# Patient Record
Sex: FEMALE | Race: WHITE | NOT HISPANIC OR LATINO | Employment: OTHER | URBAN - METROPOLITAN AREA
[De-identification: names, ages, dates, MRNs, and addresses within clinical notes are randomized per-mention and may not be internally consistent; named-entity substitution may affect disease eponyms.]

---

## 2021-09-20 ENCOUNTER — IMMUNIZATIONS (OUTPATIENT)
Dept: FAMILY MEDICINE CLINIC | Facility: HOSPITAL | Age: 66
End: 2021-09-20

## 2021-09-20 PROCEDURE — 0031A: CPT

## 2021-09-20 PROCEDURE — 91303: CPT

## 2022-04-19 ENCOUNTER — OFFICE VISIT (OUTPATIENT)
Dept: URGENT CARE | Facility: CLINIC | Age: 67
End: 2022-04-19
Payer: COMMERCIAL

## 2022-04-19 VITALS
RESPIRATION RATE: 20 BRPM | OXYGEN SATURATION: 99 % | SYSTOLIC BLOOD PRESSURE: 134 MMHG | HEART RATE: 88 BPM | TEMPERATURE: 98 F | WEIGHT: 141 LBS | HEIGHT: 65 IN | BODY MASS INDEX: 23.49 KG/M2 | DIASTOLIC BLOOD PRESSURE: 80 MMHG

## 2022-04-19 DIAGNOSIS — N30.00 ACUTE CYSTITIS WITHOUT HEMATURIA: Primary | ICD-10-CM

## 2022-04-19 DIAGNOSIS — R30.0 DYSURIA: ICD-10-CM

## 2022-04-19 LAB
SL AMB  POCT GLUCOSE, UA: NORMAL
SL AMB LEUKOCYTE ESTERASE,UA: NORMAL
SL AMB POCT BILIRUBIN,UA: NORMAL
SL AMB POCT BLOOD,UA: NORMAL
SL AMB POCT CLARITY,UA: CLEAR
SL AMB POCT COLOR,UA: YELLOW
SL AMB POCT KETONES,UA: NORMAL
SL AMB POCT NITRITE,UA: NORMAL
SL AMB POCT PH,UA: 6
SL AMB POCT SPECIFIC GRAVITY,UA: 1
SL AMB POCT URINE PROTEIN: NORMAL
SL AMB POCT UROBILINOGEN: 0.2

## 2022-04-19 PROCEDURE — 87086 URINE CULTURE/COLONY COUNT: CPT | Performed by: NURSE PRACTITIONER

## 2022-04-19 PROCEDURE — 87186 SC STD MICRODIL/AGAR DIL: CPT | Performed by: NURSE PRACTITIONER

## 2022-04-19 PROCEDURE — 99203 OFFICE O/P NEW LOW 30 MIN: CPT | Performed by: NURSE PRACTITIONER

## 2022-04-19 PROCEDURE — S9083 URGENT CARE CENTER GLOBAL: HCPCS | Performed by: NURSE PRACTITIONER

## 2022-04-19 PROCEDURE — 87077 CULTURE AEROBIC IDENTIFY: CPT | Performed by: NURSE PRACTITIONER

## 2022-04-19 PROCEDURE — 81002 URINALYSIS NONAUTO W/O SCOPE: CPT | Performed by: NURSE PRACTITIONER

## 2022-04-19 RX ORDER — NITROFURANTOIN 25; 75 MG/1; MG/1
100 CAPSULE ORAL 2 TIMES DAILY
Qty: 10 CAPSULE | Refills: 0 | Status: SHIPPED | OUTPATIENT
Start: 2022-04-19 | End: 2022-04-25 | Stop reason: ALTCHOICE

## 2022-04-19 NOTE — PROGRESS NOTES
[address] [facility]        NAME: Weston Frye is a 73 y o  female  : 1955    MRN: [id number]  DATE: 2022  TIME: 2:28 PM    Assessment and Plan   Acute cystitis without hematuria [N30 00]  1  Acute cystitis without hematuria  nitrofurantoin (MACROBID) 100 mg capsule   2  Dysuria  POCT urine dip    Urine culture    nitrofurantoin (MACROBID) 100 mg capsule         Patient Instructions       Follow up with PCP in 3-5 days  Proceed to  ER if symptoms worsen  You have a urine culture pending - you are to download  mychart for the results in 48-72 hours  You will be notified if the results are abnormal and the antibiotic needs changed  You are to avoid caffeine, alcohol  Drink water  You have been prescribed macrobid ke as prescribed  Follow up with your PCP  Go to the ED if symptoms worsen          Chief Complaint     Chief Complaint   Patient presents with    Possible UTI     chills, urinary pressure x 2 days          History of Present Illness       This is a 73year old female who states since Saturday she has had dark urine and dysuria  She states she started drinking a lot of water and the burning and darkness seemed to improve  She states that she called her PCP today and was told to come to care now  Pt states she has not had any fevers, n/v/d  But did have chills  No back pain  Review of Systems   Review of Systems   Constitutional: Positive for chills  HENT: Negative  Eyes: Negative  Respiratory: Negative  Cardiovascular: Negative  Gastrointestinal: Negative  Endocrine: Negative  Genitourinary: Positive for dysuria, frequency and urgency  Negative for flank pain and hematuria  Musculoskeletal: Negative  Skin: Negative  Allergic/Immunologic: Negative  Neurological: Negative  Hematological: Negative  Psychiatric/Behavioral: Negative            Current Medications       Current Outpatient Medications:     nitrofurantoin (MACROBID) 100 mg capsule, Take 1 capsule (100 mg total) by mouth 2 (two) times a day for 5 days, Disp: 10 capsule, Rfl: 0    Current Allergies     Allergies as of 04/19/2022 - Reviewed 04/19/2022   Allergen Reaction Noted    Sulfa antibiotics Hives 02/26/2016            The following portions of the patient's history were reviewed and updated as appropriate: allergies, current medications, past family history, past medical history, past social history, past surgical history and problem list      History reviewed  No pertinent past medical history  History reviewed  No pertinent surgical history  History reviewed  No pertinent family history  Medications have been verified  Objective   /80 (BP Location: Left arm, Patient Position: Sitting, Cuff Size: Standard)   Pulse 88   Temp 98 °F (36 7 °C)   Resp 20   Ht 5' 5" (1 651 m)   Wt 64 kg (141 lb)   SpO2 99%   BMI 23 46 kg/m²   No LMP recorded  Physical Exam     Physical Exam  Vitals and nursing note reviewed  Constitutional:       General: She is not in acute distress  Appearance: Normal appearance  She is normal weight  She is not ill-appearing, toxic-appearing or diaphoretic  HENT:      Head: Normocephalic and atraumatic  Eyes:      Extraocular Movements: Extraocular movements intact  Cardiovascular:      Rate and Rhythm: Normal rate and regular rhythm  Pulses: Normal pulses  Heart sounds: Normal heart sounds  Pulmonary:      Effort: Pulmonary effort is normal       Breath sounds: Normal breath sounds  Abdominal:      General: There is no distension  Palpations: Abdomen is soft  Tenderness: There is no abdominal tenderness  There is no right CVA tenderness or left CVA tenderness  Musculoskeletal:         General: Normal range of motion  Cervical back: Normal range of motion and neck supple  Skin:     General: Skin is warm and dry  Capillary Refill: Capillary refill takes less than 2 seconds  Neurological:      General: No focal deficit present  Mental Status: She is alert and oriented to person, place, and time  Psychiatric:         Mood and Affect: Mood normal          Behavior: Behavior normal          Thought Content: Thought content normal          Judgment: Judgment normal                UA large leuks, + nitrates and blood  Will send for ua cx     Will order macrobid - pt has had in past (however no cx were noted in [facility] care everywhere

## 2022-04-19 NOTE — PATIENT INSTRUCTIONS
You have a urine culture pending - you are to download  eZono for the results in 48-72 hours  You will be notified if the results are abnormal and the antibiotic needs changed  You are to avoid caffeine, alcohol  Drink water  You have been prescribed macrobid ke as prescribed  Follow up with your PCP  Go to the ED if symptoms worsen    Dysuria   WHAT YOU NEED TO KNOW:   Dysuria is difficulty urinating, or pain, burning, or discomfort with urination  Dysuria is usually a symptom of another problem  DISCHARGE INSTRUCTIONS:   Return to the emergency department if:   · You have severe back, side, or abdominal pain  · You have fever and shaking chills  · You vomit several times in a row  Contact your healthcare provider if:   · Your symptoms do not go away, even after treatment  · You have questions or concerns about your condition or care  Medicines:   · Medicines  may be given to help treat a bacterial infection or help decrease bladder spasms  · Take your medicine as directed  Contact your healthcare provider if you think your medicine is not helping or if you have side effects  Tell him of her if you are allergic to any medicine  Keep a list of the medicines, vitamins, and herbs you take  Include the amounts, and when and why you take them  Bring the list or the pill bottles to follow-up visits  Carry your medicine list with you in case of an emergency  Follow up with your healthcare provider as directed: Your healthcare provider may also refer you to a urologist or nephrologist to have additional testing  Write down your questions so you remember to ask them during your visits  Manage your dysuria:   · Drink more liquids  Liquids help flush out bacteria that may be causing an infection  Ask your healthcare provider how much liquid to drink each day and which liquids are best for you  · Take sitz baths as directed  Fill a bathtub with 4 to 6 inches of warm water   You may also use a sitz bath pan that fits over a toilet  Sit in the sitz bath for 20 minutes  Do this 2 to 3 times a day, or as directed  The warm water can help decrease pain and swelling  © Copyright [employer] 2022 Information is for End User's use only and may not be sold, redistributed or otherwise used for commercial purposes  All illustrations and images included in CareNotes® are the copyrighted property of [employer]  or Andreas Sidhu   The above information is an  only  It is not intended as medical advice for individual conditions or treatments  Talk to your doctor, nurse or pharmacist before following any medical regimen to see if it is safe and effective for you

## 2022-04-21 LAB — BACTERIA UR CULT: ABNORMAL

## 2022-04-25 ENCOUNTER — OFFICE VISIT (OUTPATIENT)
Dept: FAMILY MEDICINE CLINIC | Facility: CLINIC | Age: 67
End: 2022-04-25
Payer: COMMERCIAL

## 2022-04-25 VITALS
HEIGHT: 65 IN | SYSTOLIC BLOOD PRESSURE: 168 MMHG | HEART RATE: 97 BPM | TEMPERATURE: 98.1 F | WEIGHT: 138.4 LBS | BODY MASS INDEX: 23.06 KG/M2 | DIASTOLIC BLOOD PRESSURE: 102 MMHG | OXYGEN SATURATION: 96 %

## 2022-04-25 DIAGNOSIS — N30.00 ACUTE CYSTITIS WITHOUT HEMATURIA: Primary | ICD-10-CM

## 2022-04-25 DIAGNOSIS — E78.5 DYSLIPIDEMIA: ICD-10-CM

## 2022-04-25 DIAGNOSIS — R53.83 OTHER FATIGUE: ICD-10-CM

## 2022-04-25 LAB
BACTERIA UR QL AUTO: ABNORMAL /HPF
BILIRUB UR QL STRIP: NEGATIVE
CLARITY UR: ABNORMAL
COLOR UR: ABNORMAL
GLUCOSE UR STRIP-MCNC: NEGATIVE MG/DL
HGB UR QL STRIP.AUTO: ABNORMAL
KETONES UR STRIP-MCNC: NEGATIVE MG/DL
LEUKOCYTE ESTERASE UR QL STRIP: ABNORMAL
MUCOUS THREADS UR QL AUTO: ABNORMAL
NITRITE UR QL STRIP: NEGATIVE
NON-SQ EPI CELLS URNS QL MICRO: ABNORMAL /HPF
PH UR STRIP.AUTO: 5 [PH]
PROT UR STRIP-MCNC: ABNORMAL MG/DL
RBC #/AREA URNS AUTO: ABNORMAL /HPF
SP GR UR STRIP.AUTO: 1.01 (ref 1–1.03)
URATE CRY URNS QL MICRO: ABNORMAL /HPF
UROBILINOGEN UR STRIP-ACNC: <2 MG/DL
WBC #/AREA URNS AUTO: ABNORMAL /HPF

## 2022-04-25 PROCEDURE — 87086 URINE CULTURE/COLONY COUNT: CPT | Performed by: FAMILY MEDICINE

## 2022-04-25 PROCEDURE — 3008F BODY MASS INDEX DOCD: CPT | Performed by: FAMILY MEDICINE

## 2022-04-25 PROCEDURE — 3288F FALL RISK ASSESSMENT DOCD: CPT | Performed by: FAMILY MEDICINE

## 2022-04-25 PROCEDURE — 81001 URINALYSIS AUTO W/SCOPE: CPT | Performed by: FAMILY MEDICINE

## 2022-04-25 PROCEDURE — 3725F SCREEN DEPRESSION PERFORMED: CPT | Performed by: FAMILY MEDICINE

## 2022-04-25 PROCEDURE — 99204 OFFICE O/P NEW MOD 45 MIN: CPT | Performed by: FAMILY MEDICINE

## 2022-04-25 PROCEDURE — 1160F RVW MEDS BY RX/DR IN RCRD: CPT | Performed by: FAMILY MEDICINE

## 2022-04-25 PROCEDURE — 1101F PT FALLS ASSESS-DOCD LE1/YR: CPT | Performed by: FAMILY MEDICINE

## 2022-04-25 RX ORDER — LEVOFLOXACIN 250 MG/1
250 TABLET ORAL DAILY
Qty: 7 TABLET | Refills: 0 | Status: SHIPPED | OUTPATIENT
Start: 2022-04-25 | End: 2022-05-02

## 2022-04-25 NOTE — ASSESSMENT & PLAN NOTE
I am going to have the patient get some routine labs prior to her follow-up visit with me  I scheduled a follow-up visit in 1 month

## 2022-04-25 NOTE — PROGRESS NOTES
Assessment/Plan:    Acute cystitis without hematuria  I reviewed the records from the express care  Patient had urinary tract infection  Culture was positive for E coli  She was sensitive to the [address] which was prescribed  Patient is still symptomatic  I ordered a UA as well as a repeat culture and sensitivity  I started her on levofloxacin, pending results  Patient has been advised to drink plenty of fluids  Call if any worsening  Dyslipidemia  I am going to have the patient get some routine labs prior to her follow-up visit with me  I scheduled a follow-up visit in 1 month  Diagnoses and all orders for this visit:    Acute cystitis without hematuria  -     levofloxacin (LEVAQUIN) 250 mg tablet; Take 1 tablet (250 mg total) by mouth daily for 7 days  -     UA (URINE) with reflex to Scope  -     Cancel: Urine culture; Future  -     Urine culture; Future  -     Urine culture    Other fatigue  -     TSH, 3rd generation with Free T4 reflex; Future  -     CBC and differential; Future  -     Comprehensive metabolic panel; Future    Dyslipidemia  -     Lipid Panel with Direct LDL reflex; Future    Other orders  -     Ascorbic Acid (VITAMIN C PO); Take by mouth  -     VITAMIN D PO; Take by mouth  -     Multiple Vitamins-Minerals (ZINC PO); Take by mouth          Subjective:      Patient ID: Kayleigh Rodriguez is a 73 y o  female  This is a 50-year-old white female who presents to the office today for a follow-up visit from the urgent care  The patient has not been seen at this office for years  She tells me she developed urinary tract infection symptoms  She went to Urgent Care was started on Macrobid  She finished the antibiotic yesterday but is not feeling well  She complains of persistent chills although has no fever  Reports that her urine is not clear  It is very cloudy and bubbly  She is also experiencing urinary frequency  She denies any fever  She denies dysuria    She admits to urinary incontinence and urgency but tells me this is chronic  She denies any flank pain  Past medical history:  Significant for allergic rhinitis and kidney stones    Past surgical history:  Reports laser lithotripsy many years ago    Social history:  She smokes 5 cigarettes per day  Tells me she never smoked more  She is retired  She worked at [employer]  She denies alcohol use    Family history:  Her mother  from dementia  She had Parkinson's disease and hypothyroidism  Her father  of MI at age 70  He had hypertension  She has a daughter who also has kidney stones  Other daughter has V-tach  Allergies:  Patient is allergic to sulfa        The following portions of the patient's history were reviewed and updated as appropriate: allergies, current medications, past family history, past medical history, past social history, past surgical history and problem list     Review of Systems   Constitutional: Positive for chills and fatigue  Negative for activity change and fever  Cardiovascular: Negative for chest pain, palpitations and leg swelling  Gastrointestinal: Negative for abdominal distention, abdominal pain, blood in stool, constipation, diarrhea and nausea  Genitourinary: Positive for frequency and urgency  Negative for dysuria and hematuria  Objective:      BP (!) 168/102 (BP Location: Left arm, Patient Position: Sitting, Cuff Size: Adult)   Pulse 97   Temp 98 1 °F (36 7 °C) (Tympanic)   Ht 5' 5" (1 651 m)   Wt 62 8 kg (138 lb 6 4 oz)   SpO2 96%   BMI 23 03 kg/m²          Physical Exam  Vitals reviewed  Constitutional:       Comments: Patient is a pleasant 62-year-old white female who appears her stated age  She is nonseptic in appearance and in no distress   HENT:      Head: Normocephalic and atraumatic  Right Ear: Tympanic membrane, ear canal and external ear normal  There is no impacted cerumen        Left Ear: Tympanic membrane, ear canal and external ear normal  There is no impacted cerumen  Mouth/Throat:      Mouth: Mucous membranes are moist       Pharynx: Oropharynx is clear  No oropharyngeal exudate or posterior oropharyngeal erythema  Eyes:      General: No scleral icterus  Right eye: No discharge  Left eye: No discharge  Conjunctiva/sclera: Conjunctivae normal       Pupils: Pupils are equal, round, and reactive to light  Cardiovascular:      Rate and Rhythm: Normal rate and regular rhythm  Heart sounds: Normal heart sounds  No murmur heard  No friction rub  No gallop  Pulmonary:      Effort: Pulmonary effort is normal  No respiratory distress  Breath sounds: Normal breath sounds  No stridor  No wheezing, rhonchi or rales  Abdominal:      General: Bowel sounds are normal  There is no distension  Palpations: Abdomen is soft  There is no mass  Tenderness: There is no abdominal tenderness  There is no right CVA tenderness, left CVA tenderness or guarding  Musculoskeletal:      Cervical back: Neck supple  Lymphadenopathy:      Cervical: No cervical adenopathy  Psychiatric:         Mood and Affect: Mood normal          Behavior: Behavior normal          Thought Content:  Thought content normal          Judgment: Judgment normal        extremities:  Without cyanosis, clubbing, or edema

## 2022-04-25 NOTE — ASSESSMENT & PLAN NOTE
I reviewed the records from the [facility]  Patient had urinary tract infection  Culture was positive for E coli  She was sensitive to the [address] which was prescribed  Patient is still symptomatic  I ordered a UA as well as a repeat culture and sensitivity  I started her on levofloxacin, pending results  Patient has been advised to drink plenty of fluids  Call if any worsening

## 2022-04-26 ENCOUNTER — HOSPITAL ENCOUNTER (OUTPATIENT)
Dept: ULTRASOUND IMAGING | Facility: HOSPITAL | Age: 67
Discharge: HOME | End: 2022-04-26
Attending: FAMILY MEDICINE
Payer: COMMERCIAL

## 2022-04-26 DIAGNOSIS — R31.9 HEMATURIA, UNSPECIFIED TYPE: ICD-10-CM

## 2022-04-26 DIAGNOSIS — R31.9 HEMATURIA, UNSPECIFIED TYPE: Primary | ICD-10-CM

## 2022-04-26 PROCEDURE — 76770 US EXAM ABDO BACK WALL COMP: CPT

## 2022-04-26 NOTE — PROGRESS NOTES
I spoke to the patient  She is feeling much better after she took to levofloxacin  I spoke to the patient  She is feeling much better after she took to levofloxacin  Her urinalysis shows a large amount of microscopic hematuria  I am concerned about the possibility of a renal stone  She has prior history many years ago  Denies any symptoms suggestive of a stone  I am going to get ultrasound of the kidneys and bladder  She is likely going to need a follow-up visit to see a urologist as well  I will make further recommendations when I review her ultrasound as well as her urine culture, which is still pending

## 2022-04-27 LAB — BACTERIA UR CULT: NORMAL

## 2022-04-28 DIAGNOSIS — R31.29 MICROSCOPIC HEMATURIA: ICD-10-CM

## 2022-04-28 DIAGNOSIS — K76.9 LIVER LESION: ICD-10-CM

## 2022-04-28 DIAGNOSIS — N70.11 HYDROSALPINX: Primary | ICD-10-CM

## 2022-04-28 DIAGNOSIS — N13.30 HYDRONEPHROSIS, UNSPECIFIED HYDRONEPHROSIS TYPE: ICD-10-CM

## 2022-04-28 NOTE — PROGRESS NOTES
I spoke to the patient  The patient's urine culture was negative  Urinalysis is positive for microscopic blood  I reviewed her ultrasound with her  Ultrasound showed a mild right hydronephrosis  She is going to need to see Urology to evaluate this as well as to evaluate the microscopic hematuria  Ultrasound also showed a liver lesion  The lesion measures 1 8 centimeters  The patient will be scheduled for an MRI to rule out hemangioma  Lastly, right-sided hydrosalpinx was noted  Patient has not seen a gynecologist in many years and does not have 1  I placed a referral to see gynecology

## 2022-05-05 ENCOUNTER — TELEPHONE (OUTPATIENT)
Dept: FAMILY MEDICINE CLINIC | Facility: CLINIC | Age: 67
End: 2022-05-05

## 2022-05-05 NOTE — TELEPHONE ENCOUNTER
I sent in a prescription for Valium  Take 1 tablet 1 hour prior to MRI  Make sure Mary Grigsby drives

## 2022-05-05 NOTE — TELEPHONE ENCOUNTER
Patient called this am requesting that something is called in for her to have the scheduled mri on 5/10/2022  she feels she can not go through with the test without medication  please advise? Pharmacy [facility]

## 2022-05-09 ENCOUNTER — APPOINTMENT (OUTPATIENT)
Dept: LAB | Facility: HOSPITAL | Age: 67
End: 2022-05-09
Attending: FAMILY MEDICINE
Payer: COMMERCIAL

## 2022-05-09 DIAGNOSIS — D18.03 HEMANGIOMA OF LIVER: Primary | ICD-10-CM

## 2022-05-09 DIAGNOSIS — D18.03 HEMANGIOMA OF LIVER: ICD-10-CM

## 2022-05-09 LAB
BUN SERPL-MCNC: 14 MG/DL (ref 5–25)
CREAT SERPL-MCNC: 0.79 MG/DL (ref 0.6–1.3)
GFR SERPL CREATININE-BSD FRML MDRD: 78 ML/MIN/1.73SQ M

## 2022-05-09 PROCEDURE — 36415 COLL VENOUS BLD VENIPUNCTURE: CPT

## 2022-05-09 PROCEDURE — 84520 ASSAY OF UREA NITROGEN: CPT

## 2022-05-09 PROCEDURE — 82565 ASSAY OF CREATININE: CPT

## 2022-05-10 ENCOUNTER — HOSPITAL ENCOUNTER (OUTPATIENT)
Dept: MRI IMAGING | Facility: HOSPITAL | Age: 67
Discharge: HOME | End: 2022-05-10
Attending: FAMILY MEDICINE

## 2022-05-10 ENCOUNTER — TELEPHONE (OUTPATIENT)
Dept: FAMILY MEDICINE CLINIC | Facility: CLINIC | Age: 67
End: 2022-05-10

## 2022-05-10 DIAGNOSIS — K76.9 LIVER LESION: ICD-10-CM

## 2022-05-10 NOTE — TELEPHONE ENCOUNTER
PT CALLED TO LET YOU KNOW SHE WENT FOR HER MRI TODAY AND AS SOON AS THEY PUT HER IN THE TUBE SHE HAD TO BE PULLED BACK OUT   STATES SHE JUST COULD NOT DO IT

## 2022-05-12 ENCOUNTER — TELEPHONE (OUTPATIENT)
Dept: ADMINISTRATIVE | Facility: OTHER | Age: 67
End: 2022-05-12

## 2022-05-12 ENCOUNTER — TELEPHONE (OUTPATIENT)
Dept: FAMILY MEDICINE CLINIC | Facility: CLINIC | Age: 67
End: 2022-05-12

## 2022-05-12 NOTE — TELEPHONE ENCOUNTER
----- Message from Lashon Dela Cruz sent at 5/12/2022  8:12 AM EDT -----  Regarding: CARE GAP REQUEST  05/12/22 8:12 AM    Hello, our patient Keerthi Mancilla has had CRC: Colonoscopy completed/performed  Please assist in updating the patient chart by making an External outreach to DR Jernigan [facility] located in [facility] The date of service is UNKNOWN      Thank you,  Bridger Pacheco MA   [place]

## 2022-05-12 NOTE — TELEPHONE ENCOUNTER
Upon review of the In Basket request and the patient's chart, initial outreach has been made via fax, please see Contacts section for details       Thank you  Carlos Bullock MA

## 2022-05-12 NOTE — TELEPHONE ENCOUNTER
Patient seen Dr Merlinda Sever yesterday and would like you to give her a call regarding her appointment with Dr Merlinda Sever

## 2022-05-12 NOTE — LETTER
Procedure Request Form: Colonoscopy      Date Requested: 22  Patient: Jeraline Faden  Patient : 1955   Referring Provider: Golden Dos Santos, DO        Date of Procedure ______________________________       The above patient has informed us that they have completed their   most recent Colonoscopy at your facility  Please complete   this form and attach all corresponding procedure reports/results  Comments __________________________________________________________  ____________________________________________________________________  ____________________________________________________________________  ____________________________________________________________________    Facility Completing Procedure _________________________________________    Form Completed By (print name) _______________________________________      Signature __________________________________________________________      These reports are needed for  compliance  Please fax this completed form and a copy of the procedure report to our office located at [place] [address] as soon as possible to [phone] attention Daphnie Doss: Phone [phone]    We thank you for your assistance in treating our mutual patient

## 2022-05-16 ENCOUNTER — APPOINTMENT (OUTPATIENT)
Dept: LAB | Facility: CLINIC | Age: 67
End: 2022-05-16
Payer: COMMERCIAL

## 2022-05-16 DIAGNOSIS — E78.5 DYSLIPIDEMIA: ICD-10-CM

## 2022-05-16 DIAGNOSIS — R53.83 OTHER FATIGUE: ICD-10-CM

## 2022-05-16 LAB
ALBUMIN SERPL BCP-MCNC: 3.9 G/DL (ref 3.5–5)
ALP SERPL-CCNC: 56 U/L (ref 46–116)
ALT SERPL W P-5'-P-CCNC: 15 U/L (ref 12–78)
ANION GAP SERPL CALCULATED.3IONS-SCNC: 7 MMOL/L (ref 4–13)
AST SERPL W P-5'-P-CCNC: 16 U/L (ref 5–45)
BASOPHILS # BLD AUTO: 0.07 THOUSANDS/ΜL (ref 0–0.1)
BASOPHILS NFR BLD AUTO: 1 % (ref 0–1)
BILIRUB SERPL-MCNC: 1.23 MG/DL (ref 0.2–1)
BUN SERPL-MCNC: 15 MG/DL (ref 5–25)
CALCIUM SERPL-MCNC: 9.3 MG/DL (ref 8.3–10.1)
CHLORIDE SERPL-SCNC: 108 MMOL/L (ref 100–108)
CHOLEST SERPL-MCNC: 247 MG/DL
CO2 SERPL-SCNC: 26 MMOL/L (ref 21–32)
CREAT SERPL-MCNC: 0.81 MG/DL (ref 0.6–1.3)
EOSINOPHIL # BLD AUTO: 0.22 THOUSAND/ΜL (ref 0–0.61)
EOSINOPHIL NFR BLD AUTO: 3 % (ref 0–6)
ERYTHROCYTE [DISTWIDTH] IN BLOOD BY AUTOMATED COUNT: 13.3 % (ref 11.6–15.1)
GFR SERPL CREATININE-BSD FRML MDRD: 76 ML/MIN/1.73SQ M
GLUCOSE P FAST SERPL-MCNC: 105 MG/DL (ref 65–99)
HCT VFR BLD AUTO: 42.2 % (ref 34.8–46.1)
HDLC SERPL-MCNC: 59 MG/DL
HGB BLD-MCNC: 13.9 G/DL (ref 11.5–15.4)
IMM GRANULOCYTES # BLD AUTO: 0.01 THOUSAND/UL (ref 0–0.2)
IMM GRANULOCYTES NFR BLD AUTO: 0 % (ref 0–2)
LDLC SERPL CALC-MCNC: 168 MG/DL (ref 0–100)
LYMPHOCYTES # BLD AUTO: 2.16 THOUSANDS/ΜL (ref 0.6–4.47)
LYMPHOCYTES NFR BLD AUTO: 31 % (ref 14–44)
MCH RBC QN AUTO: 29.9 PG (ref 26.8–34.3)
MCHC RBC AUTO-ENTMCNC: 32.9 G/DL (ref 31.4–37.4)
MCV RBC AUTO: 91 FL (ref 82–98)
MONOCYTES # BLD AUTO: 0.59 THOUSAND/ΜL (ref 0.17–1.22)
MONOCYTES NFR BLD AUTO: 8 % (ref 4–12)
NEUTROPHILS # BLD AUTO: 3.96 THOUSANDS/ΜL (ref 1.85–7.62)
NEUTS SEG NFR BLD AUTO: 57 % (ref 43–75)
NRBC BLD AUTO-RTO: 0 /100 WBCS
PLATELET # BLD AUTO: 348 THOUSANDS/UL (ref 149–390)
PMV BLD AUTO: 9.7 FL (ref 8.9–12.7)
POTASSIUM SERPL-SCNC: 3.7 MMOL/L (ref 3.5–5.3)
PROT SERPL-MCNC: 7.5 G/DL (ref 6.4–8.2)
RBC # BLD AUTO: 4.65 MILLION/UL (ref 3.81–5.12)
SODIUM SERPL-SCNC: 141 MMOL/L (ref 136–145)
TRIGL SERPL-MCNC: 98 MG/DL
TSH SERPL DL<=0.05 MIU/L-ACNC: 1.18 UIU/ML (ref 0.45–4.5)
WBC # BLD AUTO: 7.01 THOUSAND/UL (ref 4.31–10.16)

## 2022-05-16 PROCEDURE — 36415 COLL VENOUS BLD VENIPUNCTURE: CPT

## 2022-05-16 PROCEDURE — 84443 ASSAY THYROID STIM HORMONE: CPT

## 2022-05-16 PROCEDURE — 80061 LIPID PANEL: CPT

## 2022-05-16 PROCEDURE — 85025 COMPLETE CBC W/AUTO DIFF WBC: CPT

## 2022-05-16 PROCEDURE — 80053 COMPREHEN METABOLIC PANEL: CPT

## 2022-05-17 ENCOUNTER — APPOINTMENT (EMERGENCY)
Dept: CT IMAGING | Facility: HOSPITAL | Age: 67
End: 2022-05-17
Payer: COMMERCIAL

## 2022-05-17 ENCOUNTER — HOSPITAL ENCOUNTER (EMERGENCY)
Facility: HOSPITAL | Age: 67
Discharge: HOME | End: 2022-05-17
Attending: EMERGENCY MEDICINE | Admitting: EMERGENCY MEDICINE
Payer: COMMERCIAL

## 2022-05-17 VITALS
HEART RATE: 80 BPM | WEIGHT: 135.36 LBS | DIASTOLIC BLOOD PRESSURE: 72 MMHG | BODY MASS INDEX: 22.53 KG/M2 | TEMPERATURE: 97.9 F | SYSTOLIC BLOOD PRESSURE: 160 MMHG | RESPIRATION RATE: 18 BRPM | OXYGEN SATURATION: 97 %

## 2022-05-17 DIAGNOSIS — N21.0 BLADDER CALCULUS: Primary | ICD-10-CM

## 2022-05-17 DIAGNOSIS — N13.30 HYDRONEPHROSIS: ICD-10-CM

## 2022-05-17 DIAGNOSIS — R19.00 PELVIC MASS: ICD-10-CM

## 2022-05-17 DIAGNOSIS — K76.9 LIVER LESION: ICD-10-CM

## 2022-05-17 DIAGNOSIS — N20.0 NEPHROLITHIASIS: ICD-10-CM

## 2022-05-17 LAB
ALBUMIN SERPL BCP-MCNC: 3.8 G/DL (ref 3.5–5)
ALP SERPL-CCNC: 60 U/L (ref 46–116)
ALT SERPL W P-5'-P-CCNC: 16 U/L (ref 12–78)
ANION GAP SERPL CALCULATED.3IONS-SCNC: 10 MMOL/L (ref 4–13)
AST SERPL W P-5'-P-CCNC: 13 U/L (ref 5–45)
BACTERIA UR QL AUTO: ABNORMAL /HPF
BASOPHILS # BLD AUTO: 0.05 THOUSANDS/ΜL (ref 0–0.1)
BASOPHILS NFR BLD AUTO: 1 % (ref 0–1)
BILIRUB SERPL-MCNC: 0.36 MG/DL (ref 0.2–1)
BILIRUB UR QL STRIP: NEGATIVE
BUN SERPL-MCNC: 25 MG/DL (ref 5–25)
CALCIUM SERPL-MCNC: 9.2 MG/DL (ref 8.3–10.1)
CHLORIDE SERPL-SCNC: 104 MMOL/L (ref 100–108)
CLARITY UR: ABNORMAL
CO2 SERPL-SCNC: 26 MMOL/L (ref 21–32)
COLOR UR: YELLOW
CREAT SERPL-MCNC: 0.95 MG/DL (ref 0.6–1.3)
EOSINOPHIL # BLD AUTO: 0.19 THOUSAND/ΜL (ref 0–0.61)
EOSINOPHIL NFR BLD AUTO: 2 % (ref 0–6)
ERYTHROCYTE [DISTWIDTH] IN BLOOD BY AUTOMATED COUNT: 13.2 % (ref 11.6–15.1)
GFR SERPL CREATININE-BSD FRML MDRD: 63 ML/MIN/1.73SQ M
GLUCOSE SERPL-MCNC: 135 MG/DL (ref 65–140)
GLUCOSE UR STRIP-MCNC: NEGATIVE MG/DL
HCT VFR BLD AUTO: 40.4 % (ref 34.8–46.1)
HGB BLD-MCNC: 13.2 G/DL (ref 11.5–15.4)
HGB UR QL STRIP.AUTO: ABNORMAL
IMM GRANULOCYTES # BLD AUTO: 0.02 THOUSAND/UL (ref 0–0.2)
IMM GRANULOCYTES NFR BLD AUTO: 0 % (ref 0–2)
KETONES UR STRIP-MCNC: NEGATIVE MG/DL
LEUKOCYTE ESTERASE UR QL STRIP: NEGATIVE
LIPASE SERPL-CCNC: 175 U/L (ref 73–393)
LYMPHOCYTES # BLD AUTO: 2 THOUSANDS/ΜL (ref 0.6–4.47)
LYMPHOCYTES NFR BLD AUTO: 26 % (ref 14–44)
MCH RBC QN AUTO: 29.8 PG (ref 26.8–34.3)
MCHC RBC AUTO-ENTMCNC: 32.7 G/DL (ref 31.4–37.4)
MCV RBC AUTO: 91 FL (ref 82–98)
MONOCYTES # BLD AUTO: 0.58 THOUSAND/ΜL (ref 0.17–1.22)
MONOCYTES NFR BLD AUTO: 7 % (ref 4–12)
NEUTROPHILS # BLD AUTO: 4.98 THOUSANDS/ΜL (ref 1.85–7.62)
NEUTS SEG NFR BLD AUTO: 64 % (ref 43–75)
NITRITE UR QL STRIP: NEGATIVE
NON-SQ EPI CELLS URNS QL MICRO: ABNORMAL /HPF
NRBC BLD AUTO-RTO: 0 /100 WBCS
PH UR STRIP.AUTO: 6 [PH]
PLATELET # BLD AUTO: 308 THOUSANDS/UL (ref 149–390)
PMV BLD AUTO: 9.4 FL (ref 8.9–12.7)
POTASSIUM SERPL-SCNC: 4 MMOL/L (ref 3.5–5.3)
PROT SERPL-MCNC: 7.4 G/DL (ref 6.4–8.2)
PROT UR STRIP-MCNC: NEGATIVE MG/DL
RBC # BLD AUTO: 4.43 MILLION/UL (ref 3.81–5.12)
RBC #/AREA URNS AUTO: ABNORMAL /HPF
SODIUM SERPL-SCNC: 140 MMOL/L (ref 136–145)
SP GR UR STRIP.AUTO: <=1.005 (ref 1–1.03)
UROBILINOGEN UR QL STRIP.AUTO: 0.2 E.U./DL
WBC # BLD AUTO: 7.82 THOUSAND/UL (ref 4.31–10.16)
WBC #/AREA URNS AUTO: ABNORMAL /HPF

## 2022-05-17 PROCEDURE — 83690 ASSAY OF LIPASE: CPT | Performed by: PHYSICIAN ASSISTANT

## 2022-05-17 PROCEDURE — 96361 HYDRATE IV INFUSION ADD-ON: CPT

## 2022-05-17 PROCEDURE — 36415 COLL VENOUS BLD VENIPUNCTURE: CPT | Performed by: PHYSICIAN ASSISTANT

## 2022-05-17 PROCEDURE — 96360 HYDRATION IV INFUSION INIT: CPT

## 2022-05-17 PROCEDURE — G1004 CDSM NDSC: HCPCS

## 2022-05-17 PROCEDURE — 85025 COMPLETE CBC W/AUTO DIFF WBC: CPT | Performed by: PHYSICIAN ASSISTANT

## 2022-05-17 PROCEDURE — 99284 EMERGENCY DEPT VISIT MOD MDM: CPT

## 2022-05-17 PROCEDURE — 74176 CT ABD & PELVIS W/O CONTRAST: CPT

## 2022-05-17 PROCEDURE — 99284 EMERGENCY DEPT VISIT MOD MDM: CPT | Performed by: PHYSICIAN ASSISTANT

## 2022-05-17 PROCEDURE — 80053 COMPREHEN METABOLIC PANEL: CPT | Performed by: PHYSICIAN ASSISTANT

## 2022-05-17 PROCEDURE — 81001 URINALYSIS AUTO W/SCOPE: CPT | Performed by: PHYSICIAN ASSISTANT

## 2022-05-17 RX ADMIN — SODIUM CHLORIDE 1000 ML: 0.9 INJECTION, SOLUTION INTRAVENOUS at 18:13

## 2022-05-17 NOTE — TELEPHONE ENCOUNTER
Upon review of the In Basket request we were able to locate, review, and update the patient chart as requested for CRC: Colonoscopy  Any additional questions or concerns should be emailed to the Practice Liaisons via [email or web address]  [email or web address] email, please do not reply via In Basket      Thank you  Beverlyn Severin, MA

## 2022-05-17 NOTE — ED NOTES
Patient ambulating to bathroom at this time     Shira Cuevas, [address] [facility]  05/17/22 [id number]

## 2022-05-17 NOTE — Clinical Note
Lucrecia Phillips was seen and treated in our emergency department on 5/17/2022  Diagnosis: renal calculi    Jennifer Geiger    She may return on this date: 05/18/2022    Pt may relax on the couch,  to complete household duties  If you have any questions or concerns, please don't hesitate to call        Jeannie King PA-C    ______________________________           _______________          _______________  Hospital Representative                              Date                                Time

## 2022-05-17 NOTE — DISCHARGE INSTRUCTIONS
follow-up pelvic ultrasound is recommended to further characterize the pelvic mass , this appears to be a cyst like structure

## 2022-05-17 NOTE — ED PROVIDER NOTES
History  Chief Complaint   Patient presents with    Flank Pain     Patient reports UTI one month ago took 5days of Macrobid  Has not felt right since and had multiple test since with no resolution  Today started with severe pain in right back and unable to urinate  Patient presents to the emergency department today for evaluation inability urinate right flank pain  This is a very pleasant 21-year-old female presents with family  Her tale commenced just about a month ago she had dark urine she was seen at an urgent care she had E coli positive urine which was treated with 5 days of Bactrim, the symptoms did not get better primary care park started her on a fluoroquinolone which she states she did get better from  She has had ultrasound which has noted hydronephrosis  She was scheduled for an MRI today however was too claustrophobic to complete  She states she was out shopping today noted sharp pain in the right flank  Since that point she has been unable to urinate she feels though her bladder is full  She is nontoxic appearing at bedside  Prior to Admission Medications   Prescriptions Last Dose Informant Patient Reported? Taking? Ascorbic Acid (VITAMIN C PO)  Self Yes No   Sig: Take by mouth   Multiple Vitamins-Minerals (ZINC PO)  Self Yes No   Sig: Take by mouth   VITAMIN D PO  Self Yes No   Sig: Take by mouth   diazepam (VALIUM) 10 mg tablet   No No   Sig: Take 1 tablet 1 hour prior to MRI      Facility-Administered Medications: None       Past Medical History:   Diagnosis Date    Allergic     seasonal       Past Surgical History:   Procedure Laterality Date    COLONOSCOPY         Family History   Problem Relation Age of Onset    No Known Problems Mother     Hypertension Father      I have reviewed and agree with the history as documented      E-Cigarette/Vaping    E-Cigarette Use Never User      E-Cigarette/Vaping Substances    Nicotine No     THC No     CBD No     Flavoring No  Other No     Unknown No      Social History     Tobacco Use    Smoking status: Current Every Day Smoker     Packs/day: 0 25     Years: 50 00     Pack years: 12 50     Types: Cigarettes     Start date: 0    Smokeless tobacco: Never Used   Vaping Use    Vaping Use: Never used   Substance Use Topics    Alcohol use: Not Currently    Drug use: Never       Review of Systems   Constitutional: Negative  Negative for chills and fever  HENT: Negative  Negative for sore throat and trouble swallowing  Eyes: Negative  Respiratory: Negative  Negative for cough, shortness of breath and wheezing  Cardiovascular: Negative  Negative for chest pain and leg swelling  Gastrointestinal: Negative  Negative for abdominal pain, blood in stool and vomiting  Endocrine: Negative  Genitourinary: Positive for difficulty urinating and flank pain  Musculoskeletal: Positive for back pain  Negative for neck stiffness  Skin: Negative  Allergic/Immunologic: Negative  Neurological: Negative  Negative for dizziness, seizures, speech difficulty, weakness, light-headedness, numbness and headaches  Hematological: Negative  Psychiatric/Behavioral: Negative  All other systems reviewed and are negative  Physical Exam  Physical Exam  Vitals reviewed  Constitutional:       General: She is not in acute distress  Appearance: She is well-developed  She is not ill-appearing or diaphoretic  HENT:      Right Ear: External ear normal  No swelling  Tympanic membrane is not bulging  Left Ear: External ear normal  No swelling  Tympanic membrane is not bulging  Nose: Nose normal       Mouth/Throat:      Pharynx: No oropharyngeal exudate  Eyes:      General: Lids are normal       Conjunctiva/sclera: Conjunctivae normal       Pupils: Pupils are equal, round, and reactive to light  Neck:      Thyroid: No thyromegaly  Vascular: No JVD  Trachea: No tracheal deviation     Cardiovascular: Rate and Rhythm: Normal rate and regular rhythm  Pulses: Normal pulses  Heart sounds: Normal heart sounds  No murmur heard  No friction rub  No gallop  Pulmonary:      Effort: Pulmonary effort is normal  No respiratory distress  Breath sounds: Normal breath sounds  No stridor  No wheezing or rales  Chest:      Chest wall: No tenderness  Abdominal:      General: Bowel sounds are normal  There is no distension  Palpations: Abdomen is soft  There is no mass  Tenderness: There is no abdominal tenderness  There is no guarding or rebound  Negative signs include Crisostomo's sign  Hernia: No hernia is present  Musculoskeletal:         General: No tenderness  Normal range of motion  Cervical back: Normal range of motion and neck supple  No edema  Normal range of motion  Lymphadenopathy:      Cervical: No cervical adenopathy  Skin:     General: Skin is warm and dry  Capillary Refill: Capillary refill takes less than 2 seconds  Coloration: Skin is not pale  Findings: No erythema or rash  Neurological:      General: No focal deficit present  Mental Status: She is alert and oriented to person, place, and time  GCS: GCS eye subscore is 4  GCS verbal subscore is 5  GCS motor subscore is 6  Cranial Nerves: No cranial nerve deficit  Sensory: No sensory deficit  Deep Tendon Reflexes: Reflexes are normal and symmetric  Psychiatric:         Mood and Affect: Mood normal          Speech: Speech normal          Behavior: Behavior normal          Thought Content:  Thought content normal          Judgment: Judgment normal          Vital Signs  ED Triage Vitals [05/17/22 1755]   Temperature Pulse Respirations Blood Pressure SpO2   97 9 °F (36 6 °C) 91 20 (!) 193/111 98 %      Temp Source Heart Rate Source Patient Position - Orthostatic VS BP Location FiO2 (%)   Temporal Monitor Lying Left arm --      Pain Score       --           Vitals: 05/17/22 1755 05/17/22 1900   BP: (!) 193/111 160/72   Pulse: 91 80   Patient Position - Orthostatic VS: Lying Lying         Visual Acuity      ED Medications  Medications   sodium chloride 0 9 % bolus 1,000 mL (1,000 mL Intravenous New Bag 5/17/22 1813)       Diagnostic Studies  Results Reviewed     Procedure Component Value Units Date/Time    Urine Microscopic [[id number]]  (Abnormal) Collected: 05/17/22 1842    Lab Status: Final result Specimen: Urine, Clean Catch Updated: 05/17/22 1856     RBC, UA 2-4 /hpf      WBC, UA 4-10 /hpf      Epithelial Cells Occasional /hpf      Bacteria, UA Occasional /hpf     UA w Reflex to Microscopic w Reflex to Culture [[id number]]  (Abnormal) Collected: 05/17/22 1842    Lab Status: Final result Specimen: Urine, Clean Catch Updated: 05/17/22 1850     Color, UA Yellow     Clarity, UA Slightly Cloudy     Specific Gravity, UA <=1 005     pH, UA 6 0     Leukocytes, UA Negative     Nitrite, UA Negative     Protein, UA Negative mg/dl      Glucose, UA Negative mg/dl      Ketones, UA Negative mg/dl      Urobilinogen, UA 0 2 E U /dl      Bilirubin, UA Negative     Blood, UA Moderate    Comprehensive metabolic panel [[id number]] Collected: 05/17/22 1813    Lab Status: Final result Specimen: Blood from Arm, Right Updated: 05/17/22 1834     Sodium 140 mmol/L      Potassium 4 0 mmol/L      Chloride 104 mmol/L      CO2 26 mmol/L      ANION GAP 10 mmol/L      BUN 25 mg/dL      Creatinine 0 95 mg/dL      Glucose 135 mg/dL      Calcium 9 2 mg/dL      AST 13 U/L      ALT 16 U/L      Alkaline Phosphatase 60 U/L      Total Protein 7 4 g/dL      Albumin 3 8 g/dL      Total Bilirubin 0 36 mg/dL      eGFR 63 ml/min/1 73sq m     Narrative:      Aashish guidelines for Chronic Kidney Disease (CKD):     Stage 1 with normal or high GFR (GFR > 90 mL/min/1 73 square meters)    Stage 2 Mild CKD (GFR = 60-89 mL/min/1 73 square meters)    Stage 3A Moderate CKD (GFR = 45-59 mL/min/1 73 square meters)    Stage 3B Moderate CKD (GFR = 30-44 mL/min/1 73 square meters)    Stage 4 Severe CKD (GFR = 15-29 mL/min/1 73 square meters)    Stage 5 End Stage CKD (GFR <15 mL/min/1 73 square meters)  Note: GFR calculation is accurate only with a steady state creatinine    Lipase [[id number]]  (Normal) Collected: 05/17/22 1813    Lab Status: Final result Specimen: Blood from Arm, Right Updated: 05/17/22 1834     Lipase 175 u/L     CBC and differential [[id number]] Collected: 05/17/22 1813    Lab Status: Final result Specimen: Blood from Arm, Right Updated: 05/17/22 1820     WBC 7 82 Thousand/uL      RBC 4 43 Million/uL      Hemoglobin 13 2 g/dL      Hematocrit 40 4 %      MCV 91 fL      MCH 29 8 pg      MCHC 32 7 g/dL      RDW 13 2 %      MPV 9 4 fL      Platelets 144 Thousands/uL      nRBC 0 /100 WBCs      Neutrophils Relative 64 %      Immat GRANS % 0 %      Lymphocytes Relative 26 %      Monocytes Relative 7 %      Eosinophils Relative 2 %      Basophils Relative 1 %      Neutrophils Absolute 4 98 Thousands/µL      Immature Grans Absolute 0 02 Thousand/uL      Lymphocytes Absolute 2 00 Thousands/µL      Monocytes Absolute 0 58 Thousand/µL      Eosinophils Absolute 0 19 Thousand/µL      Basophils Absolute 0 05 Thousands/µL                  CT abdomen pelvis wo contrast   Final Result by Jessenia Lanier MD (05/17 1927)      1  Moderate right hydronephrosis and hydroureter which can be traced down to the pelvis  There is a 2 mm calculus in the urinary bladder  It is questioned whether this calculus may have caused obstruction of the right kidney and ureter  Follow-up    recommended to ensure resolution  2   Right-sided nephrolithiasis  3   A couple of nonspecific liver lesions  These can be better evaluated by MRI  4   9 5 cm cystic mass in the right pelvis  Hydrosalpinx versus cystic ovarian mass or adnexal mass  Nonemergent pelvic ultrasound follow-up recommended        The study was marked in EPIC for immediate notification  Workstation performed: [id number]                    Procedures  Procedures         ED Course  ED Course as of 05/17/22 2006   Tue May 17, 2022   1823 WBC: 7 82   1823 Hemoglobin: 13 2   1823 Platelet Count: 336   1823 Blood Pressure(!): 193/111   1823 Temperature: 97 9 °F (36 6 °C)   1823 Pulse: 91   1823 Respirations: 20   1823 SpO2: 98 %   1846 Lipase: 175   1846 eGFR: 63   1846 WBC: 7 82   1846 Hemoglobin: 13 2   1846 Platelet Count: 044   1921 Blood, UA(!): Moderate   1921 Clarity, UA: Slightly Cloudy   1921 WBC, UA(!): 4-10   1921 Bacteria, UA: Occasional   1921 Lipase: 175   1953 IMPRESSION:     1  Moderate right hydronephrosis and hydroureter which can be traced down to the pelvis  There is a 2 mm calculus in the urinary bladder  It is questioned whether this calculus may have caused obstruction of the right kidney and ureter  Follow-up   recommended to ensure resolution  2   Right-sided nephrolithiasis  3   A couple of nonspecific liver lesions  These can be better evaluated by MRI  4   9 5 cm cystic mass in the right pelvis  Hydrosalpinx versus cystic ovarian mass or adnexal mass  Nonemergent pelvic ultrasound follow-up recommended  SBIRT 22yo+    Flowsheet Row Most Recent Value   SBIRT (23 yo +)    In order to provide better care to our patients, we are screening all of our patients for alcohol and drug use  Would it be okay to ask you these screening questions? Yes Filed at: 05/17/2022 1804   Initial Alcohol Screen: US AUDIT-C     1  How often do you have a drink containing alcohol? 0 Filed at: 05/17/2022 1804   2  How many drinks containing alcohol do you have on a typical day you are drinking? 0 Filed at: 05/17/2022 1804   3a  Male UNDER 65: How often do you have five or more drinks on one occasion? 0 Filed at: 05/17/2022 1804   3b  FEMALE Any Age, or MALE 65+:  How often do you have 4 or more drinks on one occassion? 0 Filed at: 05/17/2022 1804   Audit-C Score 0 Filed at: 05/17/2022 1286   KISHA: How many times in the past year have you    Used an illegal drug or used a prescription medication for non-medical reasons? Never Filed at: 05/17/2022 1804                    MDM    Disposition  Final diagnoses:   Bladder calculus   Hydronephrosis   Liver lesion   Nephrolithiasis - Right-sided   Pelvic mass     Time reflects when diagnosis was documented in both MDM as applicable and the Disposition within this note     Time User Action Codes Description Comment    5/17/2022  7:53 PM Darinel Catrachito D Add [N21 0] Bladder calculus     5/17/2022  7:53 PM Darinel Catrachito D Add [N13 30] Hydronephrosis     5/17/2022  7:53 PM Darinel Catrachito D Add [K76 9] Liver lesion     5/17/2022  7:53 PM Darinel Catrachito D Add [N20 0] Nephrolithiasis     5/17/2022  7:54 PM Darinel Catrachito D Modify [N20 0] Nephrolithiasis Right-sided    5/17/2022  7:54 PM Zoe Marin Add [R19 00] Pelvic mass       ED Disposition     ED Disposition   Discharge    Condition   Stable    Date/Time   Tue May 17, 2022  7:55 PM    Comment   [place] discharge to home/self care                 Follow-up Information     Follow up With Specialties Details Why Contact Info    Mary Carmen Lowery MD Obstetrics and Gynecology, Gynecology, Obstetrics Schedule an appointment as soon as possible for a visit   [address] [address]  [phone]            Patient's Medications   Discharge Prescriptions    No medications on file           PDMP Review       Value Time User    PDMP Reviewed  Yes 5/5/2022 10:15 AM Sera Michelle DO          ED Provider  Electronically Signed by           Teddy Bruno PA-C  05/17/22 2006

## 2022-05-20 ENCOUNTER — RA CDI HCC (OUTPATIENT)
Dept: OTHER | Facility: HOSPITAL | Age: 67
End: 2022-05-20

## 2022-05-20 NOTE — PROGRESS NOTES
[facility] 75  coding opportunities       Chart reviewed, no opportunity found:   [address]        Patients Insurance     Medicare Insurance: [employer]

## 2022-05-26 ENCOUNTER — OFFICE VISIT (OUTPATIENT)
Dept: FAMILY MEDICINE CLINIC | Facility: CLINIC | Age: 67
End: 2022-05-26
Payer: COMMERCIAL

## 2022-05-26 VITALS
SYSTOLIC BLOOD PRESSURE: 152 MMHG | DIASTOLIC BLOOD PRESSURE: 94 MMHG | WEIGHT: 135.6 LBS | TEMPERATURE: 97.2 F | BODY MASS INDEX: 22.59 KG/M2 | OXYGEN SATURATION: 99 % | HEIGHT: 65 IN | HEART RATE: 89 BPM

## 2022-05-26 DIAGNOSIS — E78.00 PURE HYPERCHOLESTEROLEMIA: ICD-10-CM

## 2022-05-26 DIAGNOSIS — R19.00 PELVIC MASS: ICD-10-CM

## 2022-05-26 DIAGNOSIS — K76.9 LESION OF LIVER: ICD-10-CM

## 2022-05-26 DIAGNOSIS — I10 ESSENTIAL HYPERTENSION, BENIGN: Primary | ICD-10-CM

## 2022-05-26 PROCEDURE — 3008F BODY MASS INDEX DOCD: CPT | Performed by: FAMILY MEDICINE

## 2022-05-26 PROCEDURE — 99214 OFFICE O/P EST MOD 30 MIN: CPT | Performed by: FAMILY MEDICINE

## 2022-05-26 PROCEDURE — 1160F RVW MEDS BY RX/DR IN RCRD: CPT | Performed by: FAMILY MEDICINE

## 2022-05-26 RX ORDER — ROSUVASTATIN CALCIUM 10 MG/1
10 TABLET, COATED ORAL
Qty: 30 TABLET | Refills: 5 | Status: SHIPPED | OUTPATIENT
Start: 2022-05-26

## 2022-05-26 RX ORDER — VALSARTAN 160 MG/1
160 TABLET ORAL DAILY
Qty: 30 TABLET | Refills: 5 | Status: SHIPPED | OUTPATIENT
Start: 2022-05-26

## 2022-05-26 NOTE — ASSESSMENT & PLAN NOTE
Patient has nonspecific liver lesions  Unfortunately, she was unable to undergo MRI, even after having been given 10 mg of Valium  The liver lesion showed up on her ultrasound  They again showed up on her CT scan  Unfortunately, she had CT scan, which had been done without contrast, was also unable to delineate what these liver lesions are  I suggested we get a repeat CT with contrast in 3 months to evaluate  If we still can not determine what these lesions are, she may need to see Gastroenterology  We may need to even consider liver biopsy

## 2022-05-26 NOTE — ASSESSMENT & PLAN NOTE
Ultrasound showed mass in the right pelvis  CT confirmed a 9 5 cm cystic mass in the right pelvis  I previously had placed a referral for the patient to see gynecology  She does have an upcoming appointment and will follow-up with gynecology    She is also due for mammogram

## 2022-05-26 NOTE — ASSESSMENT & PLAN NOTE
Patient has hyperlipidemia  Total cholesterol was 247  LDL cholesterol is 168  Her goal is less than 100  Her ACC/AHA cardiovascular risk is calculated to 19 9%  As such, I think we should really should start her on statin therapy at this time  Patient feels it is hereditary  I suspected his as well  She was started on rosuvastatin 10 mg  She will take 1 at bedtime  I planned repeat lipid panel in 6 months

## 2022-05-26 NOTE — ASSESSMENT & PLAN NOTE
Patient has hypertension  Her blood pressure has been consistently elevated  I check her blood pressure again today and found it to be 160/94  Antihypertensive therapy is indicated  I have asked the patient to follow a low-sodium diet  I asked the patient to try to exercise regularly  I started the patient on valsartan 160 mg  She will take 1 every morning  I reviewed with her her labs  Renal function was normal   CBC and thyroid function was normal   Fasting blood glucose was 105   A follow-up visit has been scheduled to reassess her blood pressure

## 2022-05-26 NOTE — PROGRESS NOTES
Assessment/Plan:    Essential hypertension, benign  Patient has hypertension  Her blood pressure has been consistently elevated  I check her blood pressure again today and found it to be 160/94  Antihypertensive therapy is indicated  I have asked the patient to follow a low-sodium diet  I asked the patient to try to exercise regularly  I started the patient on valsartan 160 mg  She will take 1 every morning  I reviewed with her her labs  Renal function was normal   CBC and thyroid function was normal   Fasting blood glucose was 105  A follow-up visit has been scheduled to reassess her blood pressure     Pure hypercholesterolemia  Patient has hyperlipidemia  Total cholesterol was 247  LDL cholesterol is 168  Her goal is less than 100  Her ACC/AHA cardiovascular risk is calculated to 19 9%  As such, I think we should really should start her on statin therapy at this time  Patient feels it is hereditary  I suspected his as well  She was started on rosuvastatin 10 mg  She will take 1 at bedtime  I planned repeat lipid panel in 6 months  Lesion of liver  Patient has nonspecific liver lesions  Unfortunately, she was unable to undergo MRI, even after having been given 10 mg of Valium  The liver lesion showed up on her ultrasound  They again showed up on her CT scan  Unfortunately, she had CT scan, which had been done without contrast, was also unable to delineate what these liver lesions are  I suggested we get a repeat CT with contrast in 3 months to evaluate  If we still can not determine what these lesions are, she may need to see Gastroenterology  We may need to even consider liver biopsy  Pelvic mass  Ultrasound showed mass in the right pelvis  CT confirmed a 9 5 cm cystic mass in the right pelvis  I previously had placed a referral for the patient to see gynecology  She does have an upcoming appointment and will follow-up with gynecology    She is also due for mammogram  Diagnoses and all orders for this visit:    Essential hypertension, benign  -     valsartan (DIOVAN) 160 mg tablet; Take 1 tablet (160 mg total) by mouth daily    Pure hypercholesterolemia  -     rosuvastatin (CRESTOR) 10 MG tablet; Take 1 tablet (10 mg total) by mouth daily at bedtime    Lesion of liver    Pelvic mass          Subjective:      Patient ID: Nathaly Mehta is a 73 y o  female  This is a 71-year-old white female who presents to the office today for her follow-up visit  The patient saw Urology in consultation  They never sent me follow-up letter  She tells me she was not pleased with the consultation  She had a urinalysis done  The urologist told her he would review her records and get back to her  He never called her back  She went to the emergency department on May 17  A CT with stone protocol was done which showed that the patient had a 2 mm stone in her bladder  She apparently had just passed a kidney stone  She has prior history of kidney stones  Her urinary symptoms have now resolved  She had nonspecific liver lesions noted on CT as well as the ultrasound which I had done  I had ordered an MRI of the abdomen to evaluated  Unfortunately, the patient was not on able to undergo the MRI  Incidental finding of a mass in the right pelvis was noted on the ultrasound which I ordered as well as the CT done in the ER  Patient does have an upcoming appointment to see gynecology  We reviewed her family history  Her maternal grandmother had type 2 diabetes  Her father had an MI at age 70  He did have hypertension and hyperlipidemia  The following portions of the patient's history were reviewed and updated as appropriate: allergies, current medications, past family history, past medical history, past social history, past surgical history and problem list     Review of Systems   Respiratory: Negative for cough, shortness of breath and wheezing      Cardiovascular: Negative for chest pain, palpitations and leg swelling  Gastrointestinal: Negative for abdominal distention, abdominal pain, blood in stool, constipation, diarrhea and nausea  Genitourinary: Negative for dysuria and frequency  Objective:      /94 (BP Location: Left arm, Patient Position: Sitting, Cuff Size: Adult)   Pulse 89   Temp (!) 97 2 °F (36 2 °C) (Tympanic)   Ht 5' 5" (1 651 m)   Wt 61 5 kg (135 lb 9 6 oz)   LMP  (LMP Unknown)   SpO2 99%   BMI 22 57 kg/m²          Physical Exam  Vitals reviewed  Constitutional:       Comments: This is a 71-year-old white female who appears her stated age  She is pleasant, cooperative, and in no distress   HENT:      Head: Normocephalic and atraumatic  Right Ear: Tympanic membrane, ear canal and external ear normal  There is no impacted cerumen  Left Ear: Tympanic membrane, ear canal and external ear normal  There is no impacted cerumen  Mouth/Throat:      Mouth: Mucous membranes are moist       Pharynx: Oropharynx is clear  No oropharyngeal exudate  Eyes:      General: No scleral icterus  Right eye: No discharge  Left eye: No discharge  Conjunctiva/sclera: Conjunctivae normal       Pupils: Pupils are equal, round, and reactive to light  Cardiovascular:      Rate and Rhythm: Normal rate and regular rhythm  Heart sounds: Normal heart sounds  No murmur heard  No friction rub  No gallop  Pulmonary:      Effort: Pulmonary effort is normal  No respiratory distress  Breath sounds: Normal breath sounds  No stridor  No wheezing, rhonchi or rales  Abdominal:      General: Bowel sounds are normal  There is no distension  Palpations: Abdomen is soft  There is no mass  Tenderness: There is no abdominal tenderness  There is no guarding  Comments: There is no organomegaly   Musculoskeletal:      Cervical back: Neck supple  Lymphadenopathy:      Cervical: No cervical adenopathy     Psychiatric: Mood and Affect: Mood normal          Behavior: Behavior normal          Thought Content:  Thought content normal          Judgment: Judgment normal        extremities:  Without cyanosis, clubbing, or edema

## 2022-07-07 ENCOUNTER — OFFICE VISIT (OUTPATIENT)
Dept: UROLOGY | Facility: CLINIC | Age: 67
End: 2022-07-07
Payer: COMMERCIAL

## 2022-07-07 VITALS
SYSTOLIC BLOOD PRESSURE: 142 MMHG | BODY MASS INDEX: 22.47 KG/M2 | DIASTOLIC BLOOD PRESSURE: 98 MMHG | HEART RATE: 74 BPM | WEIGHT: 135 LBS

## 2022-07-07 DIAGNOSIS — R31.29 MICROSCOPIC HEMATURIA: ICD-10-CM

## 2022-07-07 DIAGNOSIS — R93.5 ABNORMAL ABDOMINAL CT SCAN: ICD-10-CM

## 2022-07-07 DIAGNOSIS — R19.00 PELVIC MASS: ICD-10-CM

## 2022-07-07 DIAGNOSIS — N13.30 HYDRONEPHROSIS, UNSPECIFIED HYDRONEPHROSIS TYPE: ICD-10-CM

## 2022-07-07 DIAGNOSIS — N20.0 NEPHROLITHIASIS: Primary | ICD-10-CM

## 2022-07-07 DIAGNOSIS — K76.9 LIVER LESION: ICD-10-CM

## 2022-07-07 LAB
SL AMB  POCT GLUCOSE, UA: NORMAL
SL AMB LEUKOCYTE ESTERASE,UA: NORMAL
SL AMB POCT BILIRUBIN,UA: NORMAL
SL AMB POCT BLOOD,UA: NORMAL
SL AMB POCT CLARITY,UA: CLEAR
SL AMB POCT COLOR,UA: YELLOW
SL AMB POCT KETONES,UA: NORMAL
SL AMB POCT NITRITE,UA: NORMAL
SL AMB POCT PH,UA: 5
SL AMB POCT SPECIFIC GRAVITY,UA: 1.02
SL AMB POCT URINE PROTEIN: NORMAL
SL AMB POCT UROBILINOGEN: 0.2

## 2022-07-07 PROCEDURE — 99204 OFFICE O/P NEW MOD 45 MIN: CPT

## 2022-07-07 PROCEDURE — 81002 URINALYSIS NONAUTO W/O SCOPE: CPT

## 2022-07-07 NOTE — PROGRESS NOTES
7/7/2022    No chief complaint on file  Assessment and Plan    73 y o  female new patient to office    1  Right hydronephrosis  · CT A/P wo 5/17/2022  · Moderate right hydronephrosis and hydroureter which can be traced down to the pelvis  There is a 2 mm calculus in the urinary bladder  It is questionable whether this calculus may have caused obstruction of the right kidney and ureter  · Patient reports passing a bladder calculi approximately 2 days after discharge from the emergency department  · Urine dip in office positive for large blood, negative for leukocytes or nitrates  · CT Renal Protocol ordered and approved by Radiology    2  Microscopic hematuria  · Urine dip in office positive for large blood, negative for leukocytes or nitrates  · CT A/P wo 5/17   · Multiple right renal calculi, largest measuring a cm  · Right upper pole cyst  · Multiple phleboliths  · Left renal parapelvic cyst  · Current smoker starting as a teenager, 0 5 ppd average  · CT Renal Protocol ordered and approved by Radiology    3  Abnormal Abdominal CT  · 1  There is a peripherally calcified mass medial to the right kidney measuring 1 6 cm  This was noted on the recent ultrasound  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  · 2  A couple of nonspecific liver lesions  These can be better evaluated by MRI  · 3  9 5 cm cystic mass in the right pelvis  Hydrosalpinx versus cystic ovarian mass or adnexal mass  Consider non-emergent pelvic ultrasound  · Patient had attempted to under go an MRI abdomen w wo contrast but due to severe claustrophobia study was unable to be completed  · I discussed with Radiology and was approved for a CT Renal Protocol to further evaluate with IV contrast      4  Liver lesion  · CT Renal Protocol ordered and approved by Radiology    5   Pelvic mass  · CT Renal Protocol ordered and approved by Radiology      History of Present Illness  Staci Villa is a 73 y o  female had presented to the [address] [place] [address] emergency department on 5/17/2022 for complaints of right flank pain  CT A/P wo showed moderate right hydronephrosis and hydroureter which can be traced down to the pelvis  There was a 2 mm calculus in the urinary bladder  It is questioned whether this calculus may have caused obstruction of the right kidney and ureter  Review of labs showed a normal renal function, negative leukocytosis, and urine micro positive for 2-4 RBC, 4-10 WBC and occasional bacteria  She reports approximately 2 days after being seen in the emergency department she was able to successfully pass the bladder calculi with total resolution of her right flank pain  Urine dip in office today is positive for large blood, negative for leukocytes or nitrates  CT from 5/17 identified multiple right renal calculi, largest measuring a centimeter  She is a current smoker starting as a teenager approximately 0 5 ppd  Denies significant chemical exposure history  Denies personal or family history of bladder, kidney, or ureteral cancer  Additional Findings on CT A/P wo contrast from 5/17     1  There is a peripherally calcified mass medial to the right kidney measuring 1 6 cm  This was noted on the recent ultrasound  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  2  A couple of nonspecific liver lesions  These can be better evaluated by MRI  3  9 5 cm cystic mass in the right pelvis  Hydrosalpinx versus cystic ovarian mass or adnexal mass  Review of Systems   Constitutional: Negative for chills and fever  HENT: Negative for ear pain and sore throat  Eyes: Negative for pain and visual disturbance  Respiratory: Negative for cough and shortness of breath  Cardiovascular: Negative for chest pain and palpitations  Gastrointestinal: Negative for abdominal pain, blood in stool, constipation, diarrhea, nausea and vomiting     Genitourinary: Negative for decreased urine volume, difficulty urinating, dyspareunia, dysuria, flank pain, frequency, hematuria, pelvic pain, urgency and vaginal pain  Musculoskeletal: Negative for arthralgias and back pain  Skin: Negative for color change and rash  Neurological: Negative for dizziness, seizures, syncope, weakness, numbness and headaches  All other systems reviewed and are negative  Vitals  Vitals:    07/07/22 1134   BP: 142/98   Pulse: 74   Weight: 61 2 kg (135 lb)       Physical Exam  Vitals reviewed  Constitutional:       General: She is not in acute distress  Appearance: Normal appearance  She is normal weight  She is not ill-appearing or toxic-appearing  HENT:      Head: Normocephalic and atraumatic  Nose: Nose normal    Eyes:      General: No scleral icterus  Conjunctiva/sclera: Conjunctivae normal    Cardiovascular:      Rate and Rhythm: Normal rate  Pulses: Normal pulses  Pulmonary:      Effort: Pulmonary effort is normal  No respiratory distress  Abdominal:      General: Abdomen is flat  Palpations: Abdomen is soft  Tenderness: There is no abdominal tenderness  There is no right CVA tenderness or left CVA tenderness  Hernia: No hernia is present  Musculoskeletal:         General: Normal range of motion  Cervical back: Normal range of motion  Skin:     General: Skin is warm and dry  Neurological:      General: No focal deficit present  Mental Status: She is alert and oriented to person, place, and time  Mental status is at baseline  Psychiatric:         Mood and Affect: Mood normal          Behavior: Behavior normal          Thought Content:  Thought content normal          Judgment: Judgment normal          Past History  Past Medical History:   Diagnosis Date    Allergic     seasonal     Social History     Socioeconomic History    Marital status: /Civil Union     Spouse name: None    Number of children: None    Years of education: None    Highest education level: None Occupational History    None   Tobacco Use    Smoking status: Current Every Day Smoker     Packs/day: 0 25     Years: 50 00     Pack years: 12 50     Types: Cigarettes     Start date: 0    Smokeless tobacco: Never Used   Vaping Use    Vaping Use: Never used   Substance and Sexual Activity    Alcohol use: Not Currently     Comment: rare    Drug use: Never    Sexual activity: Yes   Other Topics Concern    None   Social History Narrative    None     Social Determinants of Health     Financial Resource Strain: Not on file   Food Insecurity: Not on file   Transportation Needs: Not on file   Physical Activity: Not on file   Stress: Not on file   Social Connections: Not on file   Intimate Partner Violence: Not on file   Housing Stability: Not on file     Social History     Tobacco Use   Smoking Status Current Every Day Smoker    Packs/day: 0 25    Years: 50 00    Pack years: 12 50    Types: Cigarettes    Start date: 1972   Smokeless Tobacco Never Used     Family History   Problem Relation Age of Onset    No Known Problems Mother     Hypertension Father        The following portions of the patient's history were reviewed and updated as appropriate allergies, current medications, past medical history, past social history, past surgical history and problem list    Imaging:    CT ABDOMEN AND PELVIS WITHOUT IV CONTRAST      5/17/2022     INDICATION:   Flank pain, kidney stone suspected  Right flank pain recent urinary tract infection      COMPARISON:  Ultrasound 4/26/2022     TECHNIQUE:  CT examination of the abdomen and pelvis was performed without intravenous contrast  This examination was performed without intravenous contrast in the context of the critical nationwide Omnipaque shortage  Axial, sagittal, and coronal 2D   reformatted images were created from the source data and submitted for interpretation       Radiation dose length product (DLP) for this visit:  544 mGy-cm     This examination, like all CT scans performed in the [facility], was performed utilizing techniques to minimize radiation dose exposure, including the use of iterative   reconstruction and automated exposure control       Enteric contrast was not administered       FINDINGS:     ABDOMEN     LOWER CHEST:  No clinically significant abnormality identified in the visualized lower chest      LIVER/BILIARY TREE:  There are couple of hypodense lesions in the right lobe measuring 1 4 and 1 5 cm (2/13, 22)  These do not have Hounsfield consistent with simple cysts  This can be better evaluated with an MRI      GALLBLADDER:  No calcified gallstones  No pericholecystic inflammatory change      SPLEEN:  Unremarkable      PANCREAS:  Unremarkable      ADRENAL GLANDS:  Unremarkable      KIDNEYS/URETERS:  There are multiple right renal calculi, the largest measuring a centimeter  There is a cyst at the upper pole  There is a moderate right hydronephrosis and proximal hydroureter which can be traced down to the pelvis  No definite   obstructing distal ureteral calculus is identified  Multiple phleboliths are noted  There are left renal parapelvic cysts  There is a peripherally calcified mass medial to the right kidney measuring 1 6 cm  This was noted on the recent ultrasound  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      STOMACH AND BOWEL:  Unremarkable      APPENDIX:  No findings to suggest appendicitis      ABDOMINOPELVIC CAVITY:  No ascites  No pneumoperitoneum  No lymphadenopathy      VESSELS:  Atherosclerotic changes are present  No evidence of aneurysm      PELVIS     REPRODUCTIVE ORGANS:  Large lobulated uterus with calcifications likely representing fibroids  Posterior and to the right of the uterus, there is a large cystic mass measuring 9 5 x 5 7 x 6 7 cm  This was described on the recent ultrasound and   suspected a right-sided hydrosalpinx    The differential diagnosis can also include a large cystic ovarian mass or adnexal mass  Consider nonemergent pelvic ultrasound      URINARY BLADDER:  There is a 2 mm calculus within the bladder  There is a bubble of gas in the urinary bladder      ABDOMINAL WALL/INGUINAL REGIONS:  There is a small fat-containing umbilical hernia      OSSEOUS STRUCTURES:  There is a grade 1/2 anterolisthesis of L5 upon S1  However, the segments are fused  There are severe degenerative changes of the right hip joint      IMPRESSION:     1  Moderate right hydronephrosis and hydroureter which can be traced down to the pelvis  There is a 2 mm calculus in the urinary bladder  It is questioned whether this calculus may have caused obstruction of the right kidney and ureter  Follow-up   recommended to ensure resolution  2   Right-sided nephrolithiasis  3   A couple of nonspecific liver lesions  These can be better evaluated by MRI  4   9 5 cm cystic mass in the right pelvis  Hydrosalpinx versus cystic ovarian mass or adnexal mass  Nonemergent pelvic ultrasound follow-up recommended  RENAL ULTRASOUND       4/26/2022     INDICATION:   R31 9: Hematuria, unspecified         COMPARISON: None     TECHNIQUE:   Ultrasound of the retroperitoneum was performed with a curvilinear transducer utilizing volumetric sweeps and still imaging techniques       FINDINGS:     KIDNEYS:  Symmetric and normal size  Right kidney:  10 6 x 5 9 x 5 3 cm  Volume 172 9 mL  Left kidney:  10 4 x 5 8 x 4 4 cm  Volume 141 0 mL     Right kidney  Normal echogenicity and contour  Upper pole simple cyst measures 1 8 x 1 3 x 1 8 cm, and there are additional smaller simple peripelvic cysts  A rim calcified ovoid structure measuring 1 5 x 1 4 x 1 1 cm lies medial to the right renal hilum, possibilities could include calcified lymph node, calcified renal artery aneurysm, pedunculated calcified renal lesion among other etiologies  Mild hydronephrosis  Lower pole shadowing calculus measures 7 mm    No perinephric fluid collections      Left kidney  Normal echogenicity and contour  Scattered small peripelvic simple cysts  No hydronephrosis  No shadowing calculi  No perinephric fluid collections      URETERS:  Nonvisualized      BLADDER:   Normally distended  No focal thickening or mass lesions  Bilateral ureteral jets detected      Incidentally noted is a tubular appearing, centrally anechoic, tortuous right adnexal structure measuring on the order of 9 x 7 x 8 cm, incompletely evaluated but likely represents hydrosalpinx      Incidentally noted is a sharply circumscribed, ovoid, homogeneously hyperechoic lesion at the right dome of the liver measuring 1 8 x 1 3 cm without internal vascular flow      IMPRESSION:     1  Mild right hydronephrosis with a 7 mm lower pole calculus  Additionally, there is a 1 5 cm calcified ovoid structure medial to the right renal hilum which is indeterminate by ultrasound  2   Incidentally noted 1 8 cm hyperechoic right hepatic lesion, likely to represent a small cavernous hemangioma but nondiagnostic by ultrasound  Further evaluation with contrast enhanced MRI abdomen could be considered for definitive characterization  3   Probable right sided hydrosalpinx, incompletely evaluated  Based on the ACR O-RADS system, this is O-RADS category 2 (almost certain benign with <9% risk of malignancy ) The management recommendation is management by gynecologist  REFERENCE:   Radiology 2020; [id number]      The study was marked in EPIC for significant notification, as well as for follow-up  Results  No results found for this or any previous visit (from the past 1 hour(s))  ]  No results found for: PSA  Lab Results   Component Value Date    CALCIUM 9 2 05/17/2022    K 4 0 05/17/2022    CO2 26 05/17/2022     05/17/2022    BUN 25 05/17/2022    CREATININE 0 95 05/17/2022     Lab Results   Component Value Date    WBC 7 82 05/17/2022    HGB 13 2 05/17/2022    HCT 40 4 05/17/2022 MCV 91 05/17/2022     05/17/2022       Please Note:  Voice dictation software has been used to create this document  There may be inadvertent transcriptions errors       Oscar Gonzalez

## 2022-07-08 ENCOUNTER — HOSPITAL ENCOUNTER (OUTPATIENT)
Dept: CT IMAGING | Facility: HOSPITAL | Age: 67
Discharge: HOME | End: 2022-07-08
Payer: COMMERCIAL

## 2022-07-08 ENCOUNTER — HOSPITAL ENCOUNTER (OUTPATIENT)
Dept: ULTRASOUND IMAGING | Facility: HOSPITAL | Age: 67
Discharge: HOME | End: 2022-07-08

## 2022-07-08 ENCOUNTER — TELEPHONE (OUTPATIENT)
Dept: OTHER | Facility: HOSPITAL | Age: 67
End: 2022-07-08

## 2022-07-08 DIAGNOSIS — R19.00 PELVIC MASS: ICD-10-CM

## 2022-07-08 DIAGNOSIS — R31.29 MICROSCOPIC HEMATURIA: ICD-10-CM

## 2022-07-08 DIAGNOSIS — N20.0 NEPHROLITHIASIS: ICD-10-CM

## 2022-07-08 DIAGNOSIS — R93.5 ABNORMAL ABDOMINAL CT SCAN: ICD-10-CM

## 2022-07-08 DIAGNOSIS — K76.9 LIVER LESION: ICD-10-CM

## 2022-07-08 DIAGNOSIS — N13.30 HYDRONEPHROSIS, UNSPECIFIED HYDRONEPHROSIS TYPE: ICD-10-CM

## 2022-07-08 PROCEDURE — 74178 CT ABD&PLV WO CNTR FLWD CNTR: CPT

## 2022-07-08 PROCEDURE — G1004 CDSM NDSC: HCPCS

## 2022-07-08 RX ADMIN — IOHEXOL 65 ML: 350 INJECTION, SOLUTION INTRAVENOUS at 14:20

## 2022-07-08 NOTE — TELEPHONE ENCOUNTER
Attempted to contact patient regarding CT Renal Protocol results from today  No answer, VM left with instructions to call office       Adriana Calloway

## 2022-07-10 NOTE — PROGRESS NOTES
IMPRESSION:     1  Moderate right hydronephrosis and hydroureter which can be traced down to the pelvis  There is a 2 mm calculus in the urinary bladder  It is questioned whether this calculus may have caused obstruction of the right kidney and ureter  Follow-up   recommended to ensure resolution  2   Right-sided nephrolithiasis  3   A couple of nonspecific liver lesions  These can be better evaluated by MRI  4   9 5 cm cystic mass in the right pelvis  Hydrosalpinx versus cystic ovarian mass or adnexal mass    Nonemergent pelvic ultrasound follow-up recommended        Need for pelvic sonogram     Went over the possible outcomes    If it is true hydrosalpinx and she has no problems there is no need to remove it   If it is ovarian in nature we would recommend to remove based on size and consistence  She understands and is in agreement     Will follow up post US   Will also need a pap

## 2022-07-11 ENCOUNTER — OFFICE VISIT (OUTPATIENT)
Dept: OBGYN CLINIC | Facility: CLINIC | Age: 67
End: 2022-07-11
Payer: COMMERCIAL

## 2022-07-11 VITALS
DIASTOLIC BLOOD PRESSURE: 80 MMHG | BODY MASS INDEX: 22.49 KG/M2 | HEIGHT: 65 IN | WEIGHT: 135 LBS | SYSTOLIC BLOOD PRESSURE: 126 MMHG

## 2022-07-11 DIAGNOSIS — N70.11 HYDROSALPINX: ICD-10-CM

## 2022-07-11 PROCEDURE — 99203 OFFICE O/P NEW LOW 30 MIN: CPT | Performed by: OBSTETRICS & GYNECOLOGY

## 2022-07-11 PROCEDURE — 1160F RVW MEDS BY RX/DR IN RCRD: CPT | Performed by: OBSTETRICS & GYNECOLOGY

## 2022-07-12 ENCOUNTER — TELEPHONE (OUTPATIENT)
Dept: UROLOGY | Facility: CLINIC | Age: 67
End: 2022-07-12

## 2022-07-12 DIAGNOSIS — I72.8 SPLENIC ARTERY ANEURYSM (HCC): ICD-10-CM

## 2022-07-12 DIAGNOSIS — I72.2 RENAL ARTERY ANEURYSM (HCC): ICD-10-CM

## 2022-07-12 DIAGNOSIS — N20.0 NEPHROLITHIASIS: Primary | ICD-10-CM

## 2022-07-12 RX ORDER — TAMSULOSIN HYDROCHLORIDE 0.4 MG/1
0.4 CAPSULE ORAL
Qty: 30 CAPSULE | Refills: 1 | Status: SHIPPED | OUTPATIENT
Start: 2022-07-12 | End: 2022-08-26 | Stop reason: ALTCHOICE

## 2022-07-12 NOTE — TELEPHONE ENCOUNTER
I spoke with patient via telephone  I confirmed her name and date of birth prior to my telephone encounter  I called to speak with patient regarding her CT Renal Protocol from 7/08/2022  I had initially seen her on 07/07/2022 for evaluation of right hydronephrosis after a CT A/P wo contrast on 05/17/2022 showed moderate right hydronephrosis and hydroureter which could be traced down to the pelvis  There was a 2 mm calculus in the urinary bladder and it was questionable whether this calculus may have caused the obstruction  In addition there were multiple right renal calculi, largest measuring 1 cm  At the time of her office visit she reporting passage of a small bladder calculi and was denying any flank pain  Her urine dip in the office was positive for large blood but negative for leukocytes or nitrates  The CT A/P also identified a peripherally calcified mass medial to the right kidney measuring 1 6 cm  This was 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  Given the presence of large blood, right hydronephrosis, and history of smoking I was able to get approval by radiology for a CT renal protocol  CT renal protocol was completed on 07/08/2022 and again showed right hydronephrosis with partially obstructing proximal right ureter calculus measuring 4 4 mm  Contrast images demonstrated contrast in the right ureter extending below the level of the calculus  There is a stricture at the level of the right proximal ureter, unchanged/right UPJ type obstruction  Additionally there were nonobstructing right renal calculi again demonstrated the largest measuring 10 mm in the interpolar region  There were bilateral renal cyst as well as parapelvic cyst   The previously seen 1 6 cm calcified mass was 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        In regards to the patient's obstructing right ureteral calculi I discussed recommendations of undergoing a cystoscopy, ureteroscopy with laser lithotripsy  Patient verbalized concern regarding having surgery because she reported having difficulty with extubation after a prior surgery as a teenager which left her intubated in the ICU for prolonged period time  I discussed alternative options of MET with a repeat x-ray KUB and renal ultrasound in 1 month  As she is currently not having any symptoms and her renal function is normal as of 05/17/2022 we can try MET  I will send a prescription for tamsulosin 0 4 mg daily to her pharmacy  She does have a prior allergy to sulfa antibiotics over 20 years ago reporting minor hives and itching  I recommend that she take this medication in the morning and if she develops any itching or hives she should stop the medication and take Benadryl  If she were to develop an anaphylactic reaction I recommend she call 911  I will also place a ambulatory referral to vascular surgery for further evaluation of the calcified splenic and renal artery aneurysms  Patient verbalized understanding is agreeable to plan      Holly Rincon

## 2022-07-13 NOTE — TELEPHONE ENCOUNTER
Called and left voicemail message for patient to return call to schedule 1 month follow up with [facility] in [facility]  Patient has imaging scheduled for 8/12/22  Can be scheduled for f/u after that so imaging will be available for review at appointment

## 2022-07-14 ENCOUNTER — HOSPITAL ENCOUNTER (OUTPATIENT)
Dept: ULTRASOUND IMAGING | Facility: HOSPITAL | Age: 67
Discharge: HOME | End: 2022-07-14
Attending: OBSTETRICS & GYNECOLOGY
Payer: COMMERCIAL

## 2022-07-14 DIAGNOSIS — N70.11 HYDROSALPINX: ICD-10-CM

## 2022-07-14 PROCEDURE — 76830 TRANSVAGINAL US NON-OB: CPT

## 2022-07-14 PROCEDURE — 76856 US EXAM PELVIC COMPLETE: CPT

## 2022-07-19 ENCOUNTER — TELEPHONE (OUTPATIENT)
Dept: OBGYN CLINIC | Facility: MEDICAL CENTER | Age: 67
End: 2022-07-19

## 2022-07-19 NOTE — TELEPHONE ENCOUNTER
Laila from [facility] called to report Significant Findings on pt's ultrasound  Please review, thank you!

## 2022-07-27 ENCOUNTER — TELEPHONE (OUTPATIENT)
Dept: OBGYN CLINIC | Facility: MEDICAL CENTER | Age: 67
End: 2022-07-27

## 2022-07-27 NOTE — TELEPHONE ENCOUNTER
Spoke to the patient about the findings  She understands that we have a plan -   To determine the next step

## 2022-07-27 NOTE — TELEPHONE ENCOUNTER
Called to let her know that I saw her results and reaching out to a onc  provider to see the best  Options prior for the patient to get her the best results   Left message for patient

## 2022-08-10 ENCOUNTER — APPOINTMENT (OUTPATIENT)
Dept: LAB | Facility: CLINIC | Age: 67
End: 2022-08-10
Payer: COMMERCIAL

## 2022-08-10 DIAGNOSIS — N20.0 NEPHROLITHIASIS: ICD-10-CM

## 2022-08-10 LAB
ANION GAP SERPL CALCULATED.3IONS-SCNC: 5 MMOL/L (ref 4–13)
BUN SERPL-MCNC: 22 MG/DL (ref 5–25)
CALCIUM SERPL-MCNC: 9.4 MG/DL (ref 8.3–10.1)
CHLORIDE SERPL-SCNC: 111 MMOL/L (ref 96–108)
CO2 SERPL-SCNC: 25 MMOL/L (ref 21–32)
CREAT SERPL-MCNC: 0.67 MG/DL (ref 0.6–1.3)
GFR SERPL CREATININE-BSD FRML MDRD: 91 ML/MIN/1.73SQ M
GLUCOSE P FAST SERPL-MCNC: 95 MG/DL (ref 65–99)
POTASSIUM SERPL-SCNC: 3.8 MMOL/L (ref 3.5–5.3)
SODIUM SERPL-SCNC: 141 MMOL/L (ref 135–147)

## 2022-08-10 PROCEDURE — 36415 COLL VENOUS BLD VENIPUNCTURE: CPT

## 2022-08-10 PROCEDURE — 80048 BASIC METABOLIC PNL TOTAL CA: CPT

## 2022-08-12 ENCOUNTER — HOSPITAL ENCOUNTER (OUTPATIENT)
Dept: RADIOLOGY | Facility: HOSPITAL | Age: 67
Discharge: HOME | End: 2022-08-12
Payer: COMMERCIAL

## 2022-08-12 ENCOUNTER — HOSPITAL ENCOUNTER (OUTPATIENT)
Dept: ULTRASOUND IMAGING | Facility: HOSPITAL | Age: 67
Discharge: HOME | End: 2022-08-12
Payer: COMMERCIAL

## 2022-08-12 DIAGNOSIS — N20.0 NEPHROLITHIASIS: ICD-10-CM

## 2022-08-12 PROCEDURE — 76770 US EXAM ABDO BACK WALL COMP: CPT

## 2022-08-12 PROCEDURE — 74018 RADEX ABDOMEN 1 VIEW: CPT

## 2022-08-17 NOTE — RESULT ENCOUNTER NOTE
Please let patient know that it appears she may have past the right ureteral calculi as the Renal US shows no hydronephrosis  I am awaiting the Xray KUB results to confirm no signs of an calculi

## 2022-08-22 ENCOUNTER — RA CDI HCC (OUTPATIENT)
Dept: OTHER | Facility: HOSPITAL | Age: 67
End: 2022-08-22

## 2022-08-22 NOTE — PROGRESS NOTES
Mariela [facility] 76  coding opportunities       Chart reviewed, no opportunity found:   [address]        Patients Insurance     Medicare Insurance: [employer]

## 2022-08-23 ENCOUNTER — TELEPHONE (OUTPATIENT)
Dept: FAMILY MEDICINE CLINIC | Facility: CLINIC | Age: 67
End: 2022-08-23

## 2022-08-23 NOTE — TELEPHONE ENCOUNTER
Spoke with Dr Dmitry Rutledge office  Patient is non compliant in follow ups  They have made several attempt to reach patient by phone and mail to contact the office with no return response  Advised that there are notes in chart from Urology in [place]

## 2022-08-24 ENCOUNTER — OFFICE VISIT (OUTPATIENT)
Dept: UROLOGY | Facility: CLINIC | Age: 67
End: 2022-08-24
Payer: COMMERCIAL

## 2022-08-24 VITALS
SYSTOLIC BLOOD PRESSURE: 110 MMHG | HEART RATE: 71 BPM | DIASTOLIC BLOOD PRESSURE: 76 MMHG | BODY MASS INDEX: 22.49 KG/M2 | HEIGHT: 65 IN | WEIGHT: 135 LBS

## 2022-08-24 DIAGNOSIS — R31.29 MICROSCOPIC HEMATURIA: ICD-10-CM

## 2022-08-24 DIAGNOSIS — N20.0 NEPHROLITHIASIS: ICD-10-CM

## 2022-08-24 DIAGNOSIS — N39.0 URINARY TRACT INFECTION WITHOUT HEMATURIA, SITE UNSPECIFIED: ICD-10-CM

## 2022-08-24 DIAGNOSIS — N13.30 HYDRONEPHROSIS, UNSPECIFIED HYDRONEPHROSIS TYPE: Primary | ICD-10-CM

## 2022-08-24 LAB
AMORPH URATE CRY URNS QL MICRO: ABNORMAL
BACTERIA UR QL AUTO: ABNORMAL /HPF
BILIRUB UR QL STRIP: NEGATIVE
CLARITY UR: ABNORMAL
COLOR UR: COLORLESS
GLUCOSE UR STRIP-MCNC: NEGATIVE MG/DL
HGB UR QL STRIP.AUTO: ABNORMAL
KETONES UR STRIP-MCNC: NEGATIVE MG/DL
LEUKOCYTE ESTERASE UR QL STRIP: ABNORMAL
MUCOUS THREADS UR QL AUTO: ABNORMAL
NITRITE UR QL STRIP: NEGATIVE
NON-SQ EPI CELLS URNS QL MICRO: ABNORMAL /HPF
PH UR STRIP.AUTO: 6.5 [PH]
PROT UR STRIP-MCNC: ABNORMAL MG/DL
RBC #/AREA URNS AUTO: ABNORMAL /HPF
SL AMB  POCT GLUCOSE, UA: ABNORMAL
SL AMB LEUKOCYTE ESTERASE,UA: 2
SL AMB POCT BILIRUBIN,UA: ABNORMAL
SL AMB POCT BLOOD,UA: ABNORMAL
SL AMB POCT CLARITY,UA: ABNORMAL
SL AMB POCT COLOR,UA: YELLOW
SL AMB POCT KETONES,UA: ABNORMAL
SL AMB POCT NITRITE,UA: ABNORMAL
SL AMB POCT PH,UA: ABNORMAL
SL AMB POCT SPECIFIC GRAVITY,UA: 1.01
SL AMB POCT URINE PROTEIN: ABNORMAL
SL AMB POCT UROBILINOGEN: ABNORMAL
SP GR UR STRIP.AUTO: 1 (ref 1–1.03)
UROBILINOGEN UR STRIP-ACNC: <2 MG/DL
WBC #/AREA URNS AUTO: ABNORMAL /HPF
WBC CLUMPS # UR AUTO: PRESENT /UL

## 2022-08-24 PROCEDURE — 81001 URINALYSIS AUTO W/SCOPE: CPT

## 2022-08-24 PROCEDURE — 87086 URINE CULTURE/COLONY COUNT: CPT

## 2022-08-24 PROCEDURE — 87077 CULTURE AEROBIC IDENTIFY: CPT

## 2022-08-24 PROCEDURE — 3074F SYST BP LT 130 MM HG: CPT

## 2022-08-24 PROCEDURE — 87186 SC STD MICRODIL/AGAR DIL: CPT

## 2022-08-24 PROCEDURE — 99213 OFFICE O/P EST LOW 20 MIN: CPT

## 2022-08-24 PROCEDURE — 81002 URINALYSIS NONAUTO W/O SCOPE: CPT

## 2022-08-24 PROCEDURE — 3078F DIAST BP <80 MM HG: CPT

## 2022-08-24 RX ORDER — NITROFURANTOIN 25; 75 MG/1; MG/1
100 CAPSULE ORAL 2 TIMES DAILY
Qty: 10 CAPSULE | Refills: 0 | Status: SHIPPED | OUTPATIENT
Start: 2022-08-24 | End: 2022-08-26 | Stop reason: SINTOL

## 2022-08-24 NOTE — PROGRESS NOTES
8/24/2022    No chief complaint on file  Assessment and Plan    78 y o  female     1  Right hydronephrosis  · Resolved  · Secondary to right 4 mm partially obstructing UPJ calculi  · Renal US and Xray KUB 8/12/2022  · Right hydronephrosis resolved  · Confirmed multiple non obstructing right renal calculi  · Recommend repeat Renal US and Xray KUB in 1 year with follow-up    2  Microscopic hematuria  · Likely secondary to obstructing ureteral calculi  · CT Renal Protocol 7/08/2022  · Right UPJ obstructing with partially obstructing calculi in proximal right ureter measuring 4 4 mm  · Renal US 8/12/2022  · 2 cm simple cyst right upper pole  · 1 8 cm left parapelvic cyst which appears simple  · Urine dip in office positive for leukocytes and blood negative for nitrates  · Patient complains of urgency and dysuria  · Will treat empirically with Macrobid  · Send for UA/Micro and culture  History of Present Illness  Kenya Restrepo is a 78 y o  female here for evaluation of right hydronephrosis and microscopic hematuria  She presents today reporting doing well since our last office visit  She does report concern for possible UTIs as since Monday she has been experiencing urinary urgency and dysuria  She had a recent GI bug with vomiting and diarrhea since resolved  Urine dip in office positive for leukocytes and blood  She denies any fever, chills, gross hematuria, abdominal pain, or flank pain  Patient was originally seen by me on 07/07/2022 after presenting to [address] [place] [address] Emergency Department 2 months earlier on 05/17/2022 for complaints of right flank pain  CT imaging at that time revealed moderate right hydronephrosis and hydroureter likely secondary to a 2 mm calculus in the urinary bladder  When I had seen her in the office on 07/07/2022 her urine dip was positive for large blood, negative for leukocytes or nitrates    Given her smoking history and abnormal findings on prior CT imaging of a 1 6 cm calcified mass medial to the right kidney suspicious for renal artery aneurysm I was able to get approval for a CT renal protocol  CT renal protocol identified a right hydronephrosis with a partially obstructing proximal right ureteral calculi measuring 4 4 mm  In addition, there were nonobstructing right renal calculi and bilateral renal cyst   It also 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  Patient elected for medical expulsion therapy of the 4 4 mm calculi with repeat Renal US and Xray KUB in 1 month  She was also referred to vascular surgery for further evaluation of the splenic and right renal artery aneurysms  Renal US from 8/12/2022 showed resolution of right hydronephrosis  There were bilateral non obstructing calculi seen  In addition a 2 cm simple cyst in the right upper pole and a 1 8 cm parapelvic cyst which appears simple  XRAY KUB also confirmed multiple right renal calculi  Review of Systems   Constitutional: Negative for chills and fever  HENT: Negative for ear pain and sore throat  Eyes: Negative for pain and visual disturbance  Respiratory: Negative for cough and shortness of breath  Cardiovascular: Negative for chest pain and palpitations  Gastrointestinal: Negative for abdominal pain, constipation, diarrhea, nausea and vomiting  Genitourinary: Positive for frequency and urgency  Negative for difficulty urinating, dysuria, flank pain, hematuria and pelvic pain  Musculoskeletal: Negative for arthralgias and back pain  Skin: Negative for color change and rash  Neurological: Negative for dizziness, seizures, syncope and weakness  All other systems reviewed and are negative  Vitals  Vitals:    08/24/22 0918   BP: 110/76   Pulse: 71   Weight: 61 2 kg (135 lb)   Height: 5' 5" (1 651 m)       Physical Exam  Vitals reviewed     Constitutional:       General: She is not in acute distress  Appearance: Normal appearance  She is normal weight  She is not ill-appearing or toxic-appearing  HENT:      Head: Normocephalic and atraumatic  Nose: Nose normal    Eyes:      General: No scleral icterus  Conjunctiva/sclera: Conjunctivae normal    Cardiovascular:      Rate and Rhythm: Normal rate  Pulses: Normal pulses  Pulmonary:      Effort: Pulmonary effort is normal  No respiratory distress  Abdominal:      General: Abdomen is flat  Palpations: Abdomen is soft  Tenderness: There is no abdominal tenderness  There is no right CVA tenderness or left CVA tenderness  Hernia: No hernia is present  Musculoskeletal:         General: Normal range of motion  Cervical back: Normal range of motion  Skin:     General: Skin is warm and dry  Neurological:      General: No focal deficit present  Mental Status: She is alert and oriented to person, place, and time  Mental status is at baseline  Psychiatric:         Mood and Affect: Mood normal          Behavior: Behavior normal          Thought Content:  Thought content normal          Judgment: Judgment normal          Past History  Past Medical History:   Diagnosis Date    Allergic     seasonal     Social History     Socioeconomic History    Marital status: /Civil Union     Spouse name: None    Number of children: None    Years of education: None    Highest education level: None   Occupational History    None   Tobacco Use    Smoking status: Current Every Day Smoker     Packs/day: 0 25     Years: 50 00     Pack years: 12 50     Types: Cigarettes     Start date: 0    Smokeless tobacco: Never Used   Vaping Use    Vaping Use: Never used   Substance and Sexual Activity    Alcohol use: Not Currently     Comment: rare    Drug use: Never    Sexual activity: Yes   Other Topics Concern    None   Social History Narrative    None     Social Determinants of Health     Financial Resource Strain: Not on file   Food Insecurity: Not on file   Transportation Needs: Not on file   Physical Activity: Not on file   Stress: Not on file   Social Connections: Not on file   Intimate Partner Violence: Not on file   Housing Stability: Not on file     Social History     Tobacco Use   Smoking Status Current Every Day Smoker    Packs/day: 0 25    Years: 50 00    Pack years: 12 50    Types: Cigarettes    Start date:    Smokeless Tobacco Never Used     Family History   Problem Relation Age of Onset    No Known Problems Mother     Hypertension Father        The following portions of the patient's history were reviewed and updated as appropriate allergies, current medications, past medical history, past social history, past surgical history and problem list    Imagin/12/2022  ABDOMEN     INDICATION:   N20 0: Calculus of kidney      COMPARISON:  Renal ultrasound from 2022, and abdomen and pelvic CT from 2022      VIEWS:  AP supine        FINDINGS:     Unchanged right renal lower pole stone and several additional subcentimeter right renal lower pole stones      No radiopaque ureteral calculi identified        Rim calcified splenic artery aneurysm and right renal artery aneurysms are again noted      Multiple calcifications in the pelvis shown to be phleboliths and calcified fibroids based on prior CT      Nonobstructive bowel gas pattern      No acute osseous abnormality is seen      IMPRESSION:     Unchanged right renal lower pole stone and several additional subcentimeter right renal lower pole stones      Rim calcified splenic artery aneurysm and right renal artery aneurysms are again noted            2022   RENAL ULTRASOUND     INDICATION:   N20 0: Calculus of kidney      COMPARISON: CT from 2022     TECHNIQUE:   Ultrasound of the retroperitoneum was performed with a curvilinear transducer utilizing volumetric sweeps and still imaging techniques       FINDINGS:     KIDNEYS:  Symmetric and normal size  Right kidney:  11 1 x 6 4 x 5 7 cm  Volume 214 1 mL  Left kidney:  10 3 x 5 8 x 7 3 cm  Volume 230 3 mL     Right kidney  Normal echogenicity and contour  No mass is identified  2 cm simple cyst is noted at the upper pole      No hydronephrosis  Right hydronephrosis has resolved  9 mm shadowing calculus is identified  2nd 1 1 cm calculus is identified  (These were remeasured utilizing the acoustic shadow)  No perinephric fluid collections      Left kidney  Normal echogenicity and contour  No mass is identified  1 8 cm parapelvic cyst is present which appears simple  No hydronephrosis  No shadowing calculi  No perinephric fluid collections      URETERS:  Nonvisualized      BLADDER:   Normally distended  No focal thickening or mass lesions  Bilateral ureteral jets detected            IMPRESSION:     Resolved right hydronephrosis      Bilateral nonobstructing calculi  7/08/2022  CT ABDOMEN AND PELVIS WITH AND WITHOUT IV CONTRAST     INDICATION:   N20 0: Calculus of kidney  N13 30: Unspecified hydronephrosis  R93 5: Abnormal findings on diagnostic imaging of other abdominal regions, including retroperitoneum  R19 00: Intra-abdominal and pelvic swelling, mass and lump, unspecified site  K76 9: Liver disease, unspecified  R31 29: Other microscopic hematuria      COMPARISON:  CT abdomen and pelvis May 2022, renal ultrasound April 2022     TECHNIQUE: Initial CT of the abdomen and pelvis was performed without intravenous contrast   Subsequent dynamic CT evaluation of the abdomen and pelvis was performed after the administration of intravenous contrast in both nephrographic and delayed   phases after the administration of intravenous contrast    Axial, sagittal, and coronal 2D reformatted images were created from the source data and submitted for interpretation       Radiation dose length product (DLP) for this visit:  1103 83 mGy-cm     This examination, like all CT scans performed in the [address] [place] [address]  [address], was performed utilizing techniques to minimize radiation dose exposure, including the use of   iterative reconstruction and automated exposure control      IV Contrast:  65 mL of iohexol (OMNIPAQUE)  Enteric Contrast:  Enteric contrast was not administered      FINDINGS:     ABDOMEN     RIGHT KIDNEY AND URETER:  No solid renal mass  No detectable urothelial mass  Right hydronephrosis is seen with partially obstructing proximal right ureter calculus measuring 4 4 mm  Contrast images demonstrate contrast in the right ureter extending below the level of the calculus  There is caliber change of the right ureter at the level of the calculus with the smaller caliber below this area  There is a stricture at the level of the right proximal ureter, unchanged/right UPJ type obstruction  Nonobstructing right renal calculi are demonstrated the largest measuring 10 mm in the interpolar region  Simple cyst upper pole right kidney measures 14 mm with an additional cyst in the upper pole right kidney measuring 8 mm  Other too small to characterize right renal lesions are seen  Right parapelvic cysts are seen      LEFT KIDNEY AND URETER:  Left parapelvic cysts and small renal parenchymal cysts  No solid renal mass  No detectable urothelial mass  No hydronephrosis or hydroureter  No urinary tract calculi  No perinephric collection      URINARY BLADDER:  No bladder wall mass  No calculi         LOWER CHEST:   Left lower lobe granuloma  Hiatal hernia  No clinically significant abnormality identified in the visualized lower chest      LIVER/BILIARY TREE:  Hypodense lesion with associated enhancement is demonstrated in the posterior right lobe, segment 6  The lesion is less visible on delayed images suggesting partial fill in which would be consistent with hemangioma  GALLBLADDER:  No calcified gallstones   No pericholecystic inflammatory change      SPLEEN:  Unremarkable      PANCREAS: Unremarkable      ADRENAL GLANDS:  Unremarkable      STOMACH AND BOWEL:  Unremarkable      APPENDIX:  No findings to suggest appendicitis      ABDOMINOPELVIC CAVITY:  No ascites  No free intraperitoneal air  No lymphadenopathy      VESSELS:  10 mm calcified splenic artery aneurysm is seen with 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  Atherosclerotic disease of the abdominal aorta         PELVIS     REPRODUCTIVE ORGANS:  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 with cystic structure in the right pelvis 8 2 x 4 2 x 5 8 cm which is stable      ABDOMINAL WALL/INGUINAL REGIONS:  Fat-containing umbilical hernia  Right inguinal hernia containing nonobstructed loops of bowel      OSSEOUS STRUCTURES:  Grade 1-2 anterolisthesis of L5 on S1 with severe spondylosis      IMPRESSION:        1  Right UPJ type obstruction with a partially obstructing calculus at the junction site in the proximal right ureter with a 4 4 mm calculus  2   Nonobstructing right renal calculi and bilateral renal cysts as well as parapelvic cysts  3   No change in cystic structure in the right pelvis  The shape is not typical for hydrosalpinx  Pelvic ultrasound should be considered  4   Right inguinal hernia containing nonobstructed bowel  The study was marked in EPIC for significant notification      Results  Recent Results (from the past 1 hour(s))   POCT urine dip    Collection Time: 08/24/22  9:27 AM   Result Value Ref Range    LEUKOCYTE ESTERASE,UA 2     NITRITE,UA n     SL AMB POCT UROBILINOGEN n     POCT URINE PROTEIN tr      PH,UA n     BLOOD,UA lg     SPECIFIC GRAVITY,UA 1 010     KETONES,UA n     BILIRUBIN,UA n     GLUCOSE, UA n      COLOR,UA yellow     CLARITY,UA cloudy    ]  No results found for: PSA  Lab Results   Component Value Date    CALCIUM 9 4 08/10/2022    K 3 8 08/10/2022    CO2 25 08/10/2022     (H) 08/10/2022    BUN 22 08/10/2022    CREATININE 0 67 08/10/2022     Lab Results   Component Value Date WBC 7 82 05/17/2022    HGB 13 2 05/17/2022    HCT 40 4 05/17/2022    MCV 91 05/17/2022     05/17/2022       Please Note:  Voice dictation software has been used to create this document  There may be inadvertent transcriptions errors       Gwendolyn Christensen

## 2022-08-25 ENCOUNTER — NURSE TRIAGE (OUTPATIENT)
Dept: OTHER | Facility: OTHER | Age: 67
End: 2022-08-25

## 2022-08-25 NOTE — TELEPHONE ENCOUNTER
I recommend discontinue use of Macrobid  We will await her urine culture and sensitivity prior to starting any additional antibiotics

## 2022-08-25 NOTE — TELEPHONE ENCOUNTER
Called and spoke with patient  Advised her to stop Macrobid and we will await final urine culture report prior to prescribing alternative antibiotic  Advised we will contact her with final results

## 2022-08-25 NOTE — TELEPHONE ENCOUNTER
Patient took 1 dose of her Macrobid and woke up in the middle of the night with severe headache, nausea, vomiting, chills, and heart palpitations  Her symptoms are improving and she would like a call back with an alternative medication  Reason for Disposition   Caller wants to use a complementary or alternative medicine    Answer Assessment - Initial Assessment Questions  1  NAME of MEDICATION: "What medicine are you calling about?"      Macrobid   2  QUESTION: "What is your question?" (e g , medication refill, side effect)      Side effect   3  PRESCRIBING HCP: "Who prescribed it?" Reason: if prescribed by specialist, call should be referred to that group  [facility] ([facility])   4  SYMPTOMS: "Do you have any symptoms?"      Nausea, vomiting, chills, headache, heart palpitations   5  SEVERITY: If symptoms are present, ask "Are they mild, moderate or severe?"      Moderate-severe (improving)   6   PREGNANCY:  "Is there any chance that you are pregnant?" "When was your last menstrual period?"      N/A    Protocols used: MEDICATION QUESTION CALL-ADULT-[place]

## 2022-08-25 NOTE — TELEPHONE ENCOUNTER
Regarding: medication reaction - headache, nausea, vomiting and chills  ----- Message from [facility] sent at 8/25/2022  9:10 AM EDT -----  "I think I am having a reaction to Macrobid  I am experiencing headache, nausea, vomiting and chills   It started last night around 6pm  What should I do?"

## 2022-08-26 ENCOUNTER — OFFICE VISIT (OUTPATIENT)
Dept: FAMILY MEDICINE CLINIC | Facility: CLINIC | Age: 67
End: 2022-08-26
Payer: COMMERCIAL

## 2022-08-26 ENCOUNTER — TELEPHONE (OUTPATIENT)
Dept: UROLOGY | Facility: CLINIC | Age: 67
End: 2022-08-26

## 2022-08-26 VITALS
SYSTOLIC BLOOD PRESSURE: 140 MMHG | HEIGHT: 65 IN | BODY MASS INDEX: 22.11 KG/M2 | OXYGEN SATURATION: 96 % | DIASTOLIC BLOOD PRESSURE: 84 MMHG | TEMPERATURE: 97.4 F | WEIGHT: 132.7 LBS | HEART RATE: 90 BPM

## 2022-08-26 DIAGNOSIS — D18.03 LIVER HEMANGIOMA: ICD-10-CM

## 2022-08-26 DIAGNOSIS — R19.00 PELVIC MASS: ICD-10-CM

## 2022-08-26 DIAGNOSIS — Z71.85 VACCINE COUNSELING: ICD-10-CM

## 2022-08-26 DIAGNOSIS — N30.00 ACUTE CYSTITIS WITHOUT HEMATURIA: ICD-10-CM

## 2022-08-26 DIAGNOSIS — N39.0 URINARY TRACT INFECTION WITHOUT HEMATURIA, SITE UNSPECIFIED: Primary | ICD-10-CM

## 2022-08-26 DIAGNOSIS — E78.00 PURE HYPERCHOLESTEROLEMIA: Primary | ICD-10-CM

## 2022-08-26 DIAGNOSIS — I10 ESSENTIAL HYPERTENSION, BENIGN: ICD-10-CM

## 2022-08-26 LAB — BACTERIA UR CULT: ABNORMAL

## 2022-08-26 PROCEDURE — 99214 OFFICE O/P EST MOD 30 MIN: CPT | Performed by: FAMILY MEDICINE

## 2022-08-26 PROCEDURE — 1160F RVW MEDS BY RX/DR IN RCRD: CPT | Performed by: FAMILY MEDICINE

## 2022-08-26 RX ORDER — CEPHALEXIN 500 MG/1
500 CAPSULE ORAL EVERY 6 HOURS SCHEDULED
Qty: 28 CAPSULE | Refills: 0 | Status: SHIPPED | OUTPATIENT
Start: 2022-08-26 | End: 2022-09-02

## 2022-08-26 NOTE — TELEPHONE ENCOUNTER
----- Message from Alisha U  80  sent at 8/26/2022  4:21 PM EDT -----  Please let patient know I sent Keflex to her pharmacy

## 2022-08-26 NOTE — ASSESSMENT & PLAN NOTE
Patient has hyperlipidemia  She discontinued her rosuvastatin secondary to side effects  Her ACC/AHA cardiovascular risk is calculated to be 13 9%  I joked with patient that I did not make her cholesterol High  She believes her issue is hereditary  That is likely the case  I ordered a CMP and direct LDL cholesterol to be done prior to her next office visit  If her LDL cholesterol remains elevated, as I suspect it will be, we may need to consider trying a different statin

## 2022-08-26 NOTE — ASSESSMENT & PLAN NOTE
I reviewed patient's records  The patient did see Gynecology  I reviewed her pelvic ultrasound  She has an 8 5 cm right adnexal mass  It is cystic  Gynecology is currently consulting with gynecologic oncology for best course of action

## 2022-08-26 NOTE — ASSESSMENT & PLAN NOTE
I was able to view a preliminary urine culture results on the patient  It was positive for greater than 100,000 gram-negative rods  Final culture and sensitivity are pending  Patient tells me that Urology is going to handle this for her

## 2022-08-26 NOTE — ASSESSMENT & PLAN NOTE
We were going to order a he CT of the abdomen with contrast to further evaluate her liver lesion today  The plan was to check a CT in 3 months as the patient was unable to get the MRI  Patient had CT of the abdomen done approximately 1 month ago by her urologist   They were able to evaluate the liver lesion on this CT  It confirmed that the mass was a liver hemangioma  I explained the diagnosis to the patient    No further follow-up is indicated

## 2022-08-26 NOTE — ASSESSMENT & PLAN NOTE
I checked the patient's blood pressure myself today and found it to be 144/86  Patient apparently believe she has white coat hypertension  She tells me her daughter, who I know well and who is an RN, has been checking her blood pressures and they have been normal at home

## 2022-08-26 NOTE — PROGRESS NOTES
Assessment/Plan:    Essential hypertension, benign  I checked the patient's blood pressure myself today and found it to be 144/86  Patient apparently believe she has white coat hypertension  She tells me her daughter, who I know well and who is an RN, has been checking her blood pressures and they have been normal at home  Pure hypercholesterolemia  Patient has hyperlipidemia  She discontinued her rosuvastatin secondary to side effects  Her ACC/AHA cardiovascular risk is calculated to be 13 9%  I joked with patient that I did not make her cholesterol High  She believes her issue is hereditary  That is likely the case  I ordered a CMP and direct LDL cholesterol to be done prior to her next office visit  If her LDL cholesterol remains elevated, as I suspect it will be, we may need to consider trying a different statin  Pelvic mass  I reviewed patient's records  The patient did see Gynecology  I reviewed her pelvic ultrasound  She has an 8 5 cm right adnexal mass  It is cystic  Gynecology is currently consulting with gynecologic oncology for best course of action  Acute cystitis without hematuria  I was able to view a preliminary urine culture results on the patient  It was positive for greater than 100,000 gram-negative rods  Final culture and sensitivity are pending  Patient tells me that Urology is going to handle this for her  Liver hemangioma  We were going to order a he CT of the abdomen with contrast to further evaluate her liver lesion today  The plan was to check a CT in 3 months as the patient was unable to get the MRI  Patient had CT of the abdomen done approximately 1 month ago by her urologist   They were able to evaluate the liver lesion on this CT  It confirmed that the mass was a liver hemangioma  I explained the diagnosis to the patient  No further follow-up is indicated    Vaccine counseling  I reviewed the patient's vaccine history    She received a single [employer] COVID-19 vaccine  I recommended that she get the booster shot  The patient than told me all this stuff that happened to me, happened after I got the COVID vaccine  She then argues why she has to get the shot when all these migrants come across the border and do not have to get vaccinated  Patient is not going to go for the booster shot  I told the patient that the COVID-19 virus is rampant this area right now  Diagnoses and all orders for this visit:    Pure hypercholesterolemia  -     LDL cholesterol, direct; Future  -     Comprehensive metabolic panel; Future    Essential hypertension, benign    Pelvic mass    Acute cystitis without hematuria    Liver hemangioma    Vaccine counseling        I recommended a screening mammogram for the patient  The patient argues that her breasts are small  I told the patient she can still get breast cancer  I recommended the mammogram for the patient but she refused  Subjective:      Patient ID: Nsetor Craft is a 78 y o  female  This is a 72-year-old white female presents to the office today for her follow-up visit  The patient tells me she stopped her blood pressure medication  She tells me that she felt very lightheaded and dizzy  Her daughter came over and checked her blood pressure and it was 110/64 so she stopped her medication  She tells me I make her blood pressure high  She tells me it is normal elsewhere  Of note, she tells me she went to see her urologist the other day  Her blood pressure was 110/76 at Urology  She tells me she had no dizziness  She also stopped her cholesterol medication  She tells me her stomach pain was unbelievable  She tells me she developed a urinary tract infection  She saw her urologist   He was going to prescribe Keflex for her  She states that she asked him for Macrobid since I treated her for a previous UTI with Macrobid and it was effective    She tells me she had a terrible reaction to the Macrobid this time  She had headaches, nausea, vomiting, and chills  She discontinued the medication  She tells me her urologist is waiting for the final culture to decide what to prescribe for her  The following portions of the patient's history were reviewed and updated as appropriate: allergies, current medications, past family history, past medical history, past social history, past surgical history and problem list     Review of Systems   Constitutional: Negative for activity change, appetite change and fever  Respiratory: Negative for cough and shortness of breath  Cardiovascular: Negative for chest pain, palpitations and leg swelling  Gastrointestinal: Negative for abdominal distention, abdominal pain, blood in stool, constipation and diarrhea  Genitourinary: Positive for dysuria and frequency  Objective:      /84   Pulse 90   Temp (!) 97 4 °F (36 3 °C) (Tympanic)   Ht 5' 5" (1 651 m)   Wt 60 2 kg (132 lb 11 2 oz)   LMP  (LMP Unknown)   SpO2 96%   BMI 22 08 kg/m²          Physical Exam  Vitals reviewed  Constitutional:       Comments: This is a 70-year-old white female who appears her stated age  The patient is pleasant, cooperative, and in no distress  HENT:      Head: Normocephalic and atraumatic  Right Ear: Tympanic membrane, ear canal and external ear normal  There is no impacted cerumen  Left Ear: Tympanic membrane, ear canal and external ear normal  There is no impacted cerumen  Mouth/Throat:      Mouth: Mucous membranes are moist       Pharynx: Oropharynx is clear  No oropharyngeal exudate or posterior oropharyngeal erythema  Eyes:      General: No scleral icterus  Right eye: No discharge  Left eye: No discharge  Conjunctiva/sclera: Conjunctivae normal       Pupils: Pupils are equal, round, and reactive to light  Neck:      Vascular: No carotid bruit        Comments: No thyromegaly  Cardiovascular:      Rate and Rhythm: Normal rate and regular rhythm  Heart sounds: Normal heart sounds  No murmur heard  No friction rub  No gallop  Pulmonary:      Effort: Pulmonary effort is normal  No respiratory distress  Breath sounds: Normal breath sounds  No stridor  No wheezing, rhonchi or rales  Abdominal:      General: Bowel sounds are normal  There is no distension  Palpations: Abdomen is soft  There is no mass (I was unable to palpate her ovarian mass)  Tenderness: There is no abdominal tenderness  There is no guarding  Comments: There was no hepatosplenomegaly   Musculoskeletal:      Cervical back: Neck supple  Lymphadenopathy:      Cervical: No cervical adenopathy  Psychiatric:         Mood and Affect: Mood normal          Behavior: Behavior normal          Thought Content:  Thought content normal          Judgment: Judgment normal        extremities:  Without cyanosis, clubbing, or edema

## 2022-08-26 NOTE — ASSESSMENT & PLAN NOTE
I reviewed the patient's vaccine history  She received a single [employer] COVID-19 vaccine  I recommended that she get the booster shot  The patient than told me all this stuff that happened to me, happened after I got the COVID vaccine  She then argues why she has to get the shot when all these migrants come across the border and do not have to get vaccinated  Patient is not going to go for the booster shot  I told the patient that the COVID-19 virus is rampant this area right now

## 2022-09-12 ENCOUNTER — APPOINTMENT (OUTPATIENT)
Dept: LAB | Facility: CLINIC | Age: 67
End: 2022-09-12
Payer: COMMERCIAL

## 2022-09-12 DIAGNOSIS — R39.9 UTI SYMPTOMS: Primary | ICD-10-CM

## 2022-09-12 LAB
AMORPH URATE CRY URNS QL MICRO: ABNORMAL
BACTERIA UR QL AUTO: ABNORMAL /HPF
BILIRUB UR QL STRIP: NEGATIVE
CLARITY UR: ABNORMAL
COLOR UR: ABNORMAL
GLUCOSE UR STRIP-MCNC: NEGATIVE MG/DL
HGB UR QL STRIP.AUTO: ABNORMAL
KETONES UR STRIP-MCNC: NEGATIVE MG/DL
LEUKOCYTE ESTERASE UR QL STRIP: ABNORMAL
MUCOUS THREADS UR QL AUTO: ABNORMAL
NITRITE UR QL STRIP: POSITIVE
NON-SQ EPI CELLS URNS QL MICRO: ABNORMAL /HPF
PH UR STRIP.AUTO: 6 [PH]
PROT UR STRIP-MCNC: ABNORMAL MG/DL
RBC #/AREA URNS AUTO: ABNORMAL /HPF
SP GR UR STRIP.AUTO: 1.01 (ref 1–1.03)
UROBILINOGEN UR STRIP-ACNC: <2 MG/DL
WBC #/AREA URNS AUTO: ABNORMAL /HPF

## 2022-09-12 PROCEDURE — 81001 URINALYSIS AUTO W/SCOPE: CPT

## 2022-09-12 PROCEDURE — 87077 CULTURE AEROBIC IDENTIFY: CPT

## 2022-09-12 PROCEDURE — 87186 SC STD MICRODIL/AGAR DIL: CPT

## 2022-09-12 PROCEDURE — 87086 URINE CULTURE/COLONY COUNT: CPT

## 2022-09-12 NOTE — TELEPHONE ENCOUNTER
Called and spoke with Sahil Garcia  States her UTI symptoms returned after completing full course of Keflex  She is currently having cloudy urine with a foul odor and burning with urination  Also reports intermittent chills without fevers  Orders placed for repeat urine testing and patient will leave sample this afternoon at the lab  Advised to hydrate well with water and patient states she will  some OTC Azo to help with burning  She is aware office will contact her with results  Patient would like to wait and see the results of urine testing and possibly discuss medication for UTI prevention post test results

## 2022-09-12 NOTE — TELEPHONE ENCOUNTER
Patient called today regarding UTI Symptoms have returned after Completing the full course of Keflex  Please call patient for care advise

## 2022-09-13 DIAGNOSIS — N39.0 URINARY TRACT INFECTION WITHOUT HEMATURIA, SITE UNSPECIFIED: Primary | ICD-10-CM

## 2022-09-13 RX ORDER — CEPHALEXIN 500 MG/1
500 CAPSULE ORAL EVERY 6 HOURS SCHEDULED
Qty: 28 CAPSULE | Refills: 0 | Status: SHIPPED | OUTPATIENT
Start: 2022-09-13 | End: 2022-09-20

## 2022-09-13 NOTE — RESULT ENCOUNTER NOTE
Patients urinalysis appears to be positive for UTI. I have sent Keflex to her pharmacy. Will contact her when the culture and sensitivity as result if this is to be changed.

## 2022-09-14 LAB — BACTERIA UR CULT: ABNORMAL

## 2022-10-11 PROBLEM — N30.00 ACUTE CYSTITIS WITHOUT HEMATURIA: Status: RESOLVED | Noted: 2022-04-25 | Resolved: 2022-10-11

## 2022-12-19 ENCOUNTER — TELEPHONE (OUTPATIENT)
Dept: OTHER | Facility: OTHER | Age: 67
End: 2022-12-19

## 2022-12-19 NOTE — TELEPHONE ENCOUNTER
Patient has upcoming appt scheduled for 12/22; patient last seen in August by BRANDON Boyd  AVS states patient to return in one year; OV scheduled for 12/22 states 6mo f/u  Patient wanted to clarify if she is ok to just f/u in one year (next August, 2023)

## 2022-12-21 ENCOUNTER — APPOINTMENT (OUTPATIENT)
Dept: LAB | Facility: CLINIC | Age: 67
End: 2022-12-21

## 2022-12-21 ENCOUNTER — RA CDI HCC (OUTPATIENT)
Dept: OTHER | Facility: HOSPITAL | Age: 67
End: 2022-12-21

## 2022-12-21 DIAGNOSIS — E78.00 PURE HYPERCHOLESTEROLEMIA: ICD-10-CM

## 2022-12-21 LAB
ALBUMIN SERPL BCP-MCNC: 3.8 G/DL (ref 3.5–5)
ALP SERPL-CCNC: 59 U/L (ref 46–116)
ALT SERPL W P-5'-P-CCNC: 15 U/L (ref 12–78)
ANION GAP SERPL CALCULATED.3IONS-SCNC: 7 MMOL/L (ref 4–13)
AST SERPL W P-5'-P-CCNC: 13 U/L (ref 5–45)
BILIRUB SERPL-MCNC: 0.81 MG/DL (ref 0.2–1)
BUN SERPL-MCNC: 18 MG/DL (ref 5–25)
CALCIUM SERPL-MCNC: 9.2 MG/DL (ref 8.3–10.1)
CHLORIDE SERPL-SCNC: 110 MMOL/L (ref 96–108)
CO2 SERPL-SCNC: 24 MMOL/L (ref 21–32)
CREAT SERPL-MCNC: 0.68 MG/DL (ref 0.6–1.3)
GFR SERPL CREATININE-BSD FRML MDRD: 91 ML/MIN/1.73SQ M
GLUCOSE P FAST SERPL-MCNC: 101 MG/DL (ref 65–99)
LDLC SERPL DIRECT ASSAY-MCNC: 187 MG/DL (ref 0–100)
POTASSIUM SERPL-SCNC: 3.7 MMOL/L (ref 3.5–5.3)
PROT SERPL-MCNC: 7.4 G/DL (ref 6.4–8.4)
SODIUM SERPL-SCNC: 141 MMOL/L (ref 135–147)

## 2022-12-21 NOTE — PROGRESS NOTES
[facility]  coding opportunities       Chart reviewed, no opportunity found:   [address]        Patients Insurance     Medicare Insurance: [employer]

## 2022-12-27 ENCOUNTER — OFFICE VISIT (OUTPATIENT)
Dept: FAMILY MEDICINE CLINIC | Facility: CLINIC | Age: 67
End: 2022-12-27

## 2022-12-27 VITALS
HEART RATE: 101 BPM | DIASTOLIC BLOOD PRESSURE: 100 MMHG | OXYGEN SATURATION: 97 % | SYSTOLIC BLOOD PRESSURE: 158 MMHG | TEMPERATURE: 97.3 F | WEIGHT: 138.6 LBS | HEIGHT: 65 IN | BODY MASS INDEX: 23.09 KG/M2

## 2022-12-27 DIAGNOSIS — Z28.20 IMMUNIZATION NOT CARRIED OUT BECAUSE OF PATIENT DECISION: ICD-10-CM

## 2022-12-27 DIAGNOSIS — E78.00 PURE HYPERCHOLESTEROLEMIA: ICD-10-CM

## 2022-12-27 DIAGNOSIS — I10 ESSENTIAL HYPERTENSION, BENIGN: Primary | ICD-10-CM

## 2022-12-27 DIAGNOSIS — Z00.00 ENCOUNTER FOR MEDICARE ANNUAL WELLNESS EXAM: ICD-10-CM

## 2022-12-27 DIAGNOSIS — Z53.20 MAMMOGRAM DECLINED: ICD-10-CM

## 2022-12-27 DIAGNOSIS — R19.00 PELVIC MASS: ICD-10-CM

## 2022-12-27 RX ORDER — LISINOPRIL 10 MG/1
10 TABLET ORAL DAILY
Qty: 30 TABLET | Refills: 5 | Status: SHIPPED | OUTPATIENT
Start: 2022-12-27

## 2022-12-27 NOTE — PATIENT INSTRUCTIONS
Medicare Preventive Visit Patient Instructions  Thank you for completing your Welcome to Medicare Visit or Medicare Annual Wellness Visit today  Your next wellness visit will be due in one year (12/28/2023)  The screening/preventive services that you may require over the next 5-10 years are detailed below  Some tests may not apply to you based off risk factors and/or age  Screening tests ordered at today's visit but not completed yet may show as past due  Also, please note that scanned in results may not display below  Preventive Screenings:  Service Recommendations Previous Testing/Comments   Colorectal Cancer Screening  * Colonoscopy    * Fecal Occult Blood Test (FOBT)/Fecal Immunochemical Test (FIT)  * Fecal DNA/Cologuard Test  * Flexible Sigmoidoscopy Age: 39-70 years old   Colonoscopy: every 10 years (may be performed more frequently if at higher risk)  OR  FOBT/FIT: every 1 year  OR  Cologuard: every 3 years  OR  Sigmoidoscopy: every 5 years  Screening may be recommended earlier than age 39 if at higher risk for colorectal cancer  Also, an individualized decision between you and your healthcare provider will decide whether screening between the ages of 74-80 would be appropriate  Colonoscopy: 04/09/2016  FOBT/FIT: Not on file  Cologuard: Not on file  Sigmoidoscopy: Not on file    Screening Current     Breast Cancer Screening Age: 36 years old  Frequency: every 1-2 years  Not required if history of left and right mastectomy Mammogram: Not on file        Cervical Cancer Screening Between the ages of 21-29, pap smear recommended once every 3 years  Between the ages of 33-67, can perform pap smear with HPV co-testing every 5 years     Recommendations may differ for women with a history of total hysterectomy, cervical cancer, or abnormal pap smears in past  Pap Smear: Not on file    Screening Not Indicated   Hepatitis C Screening Once for adults born between 1945 and 1965  More frequently in patients at high risk for Hepatitis C Hep C Antibody: Not on file    Screening Current   Diabetes Screening 1-2 times per year if you're at risk for diabetes or have pre-diabetes Fasting glucose: 101 mg/dL (12/21/2022)  A1C: No results in last 5 years (No results in last 5 years)  Screening Current   Cholesterol Screening Once every 5 years if you don't have a lipid disorder  May order more often based on risk factors  Lipid panel: 05/16/2022    Screening Not Indicated  History Lipid Disorder     Other Preventive Screenings Covered by Medicare:  1  Abdominal Aortic Aneurysm (AAA) Screening: covered once if your at risk  You're considered to be at risk if you have a family history of AAA  2  Lung Cancer Screening: covers low dose CT scan once per year if you meet all of the following conditions: (1) Age 50-69; (2) No signs or symptoms of lung cancer; (3) Current smoker or have quit smoking within the last 15 years; (4) You have a tobacco smoking history of at least 20 pack years (packs per day multiplied by number of years you smoked); (5) You get a written order from a healthcare provider  3  Glaucoma Screening: covered annually if you're considered high risk: (1) You have diabetes OR (2) Family history of glaucoma OR (3)  aged 48 and older OR (3)  American aged 72 and older  3  Osteoporosis Screening: covered every 2 years if you meet one of the following conditions: (1) You're estrogen deficient and at risk for osteoporosis based off medical history and other findings; (2) Have a vertebral abnormality; (3) On glucocorticoid therapy for more than 3 months; (4) Have primary hyperparathyroidism; (5) On osteoporosis medications and need to assess response to drug therapy  · Last bone density test (DXA Scan): Not on file  5  HIV Screening: covered annually if you're between the age of 12-76  Also covered annually if you are younger than 13 and older than 72 with risk factors for HIV infection   For pregnant patients, it is covered up to 3 times per pregnancy  Immunizations:  Immunization Recommendations   Influenza Vaccine Annual influenza vaccination during flu season is recommended for all persons aged >= 6 months who do not have contraindications   Pneumococcal Vaccine   * Pneumococcal conjugate vaccine = PCV13 (Prevnar 13), PCV15 (Vaxneuvance), PCV20 (Prevnar 20)  * Pneumococcal polysaccharide vaccine = PPSV23 (Pneumovax) Adults 25-60 years old: 1-3 doses may be recommended based on certain risk factors  Adults 72 years old: 1-2 doses may be recommended based off what pneumonia vaccine you previously received   Hepatitis B Vaccine 3 dose series if at intermediate or high risk (ex: diabetes, end stage renal disease, liver disease)   Tetanus (Td) Vaccine - COST NOT COVERED BY MEDICARE PART B Following completion of primary series, a booster dose should be given every 10 years to maintain immunity against tetanus  Td may also be given as tetanus wound prophylaxis  Tdap Vaccine - COST NOT COVERED BY MEDICARE PART B Recommended at least once for all adults  For pregnant patients, recommended with each pregnancy  Shingles Vaccine (Shingrix) - COST NOT COVERED BY MEDICARE PART B  2 shot series recommended in those aged 48 and above     Health Maintenance Due:      Topic Date Due   • Breast Cancer Screening: Mammogram  Never done   • Hepatitis C Screening  05/26/2023 (Originally 1955)   • Colorectal Cancer Screening  04/09/2026     Immunizations Due:      Topic Date Due   • Hepatitis B Vaccine (1 of 3 - 3-dose series) Never done   • Pneumococcal Vaccine: 65+ Years (1 - PCV) Never done   • COVID-19 Vaccine (2 - Booster for Neema series) 11/15/2021   • Influenza Vaccine (1) Never done     Advance Directives   What are advance directives? Advance directives are legal documents that state your wishes and plans for medical care   These plans are made ahead of time in case you lose your ability to make decisions for yourself  Advance directives can apply to any medical decision, such as the treatments you want, and if you want to donate organs  What are the types of advance directives? There are many types of advance directives, and each state has rules about how to use them  You may choose a combination of any of the following:  · Living will: This is a written record of the treatment you want  You can also choose which treatments you do not want, which to limit, and which to stop at a certain time  This includes surgery, medicine, IV fluid, and tube feedings  · Durable power of  for healthcare [facility]): This is a written record that states who you want to make healthcare choices for you when you are unable to make them for yourself  This person, called a proxy, is usually a family member or a friend  You may choose more than 1 proxy  · Do not resuscitate (DNR) order:  A DNR order is used in case your heart stops beating or you stop breathing  It is a request not to have certain forms of treatment, such as CPR  A DNR order may be included in other types of advance directives  · Medical directive: This covers the care that you want if you are in a coma, near death, or unable to make decisions for yourself  You can list the treatments you want for each condition  Treatment may include pain medicine, surgery, blood transfusions, dialysis, IV or tube feedings, and a ventilator (breathing machine)  · Values history: This document has questions about your views, beliefs, and how you feel and think about life  This information can help others choose the care that you would choose  Why are advance directives important? An advance directive helps you control your care  Although spoken wishes may be used, it is better to have your wishes written down  Spoken wishes can be misunderstood, or not followed  Treatments may be given even if you do not want them   An advance directive may make it easier for your family to make difficult choices about your care  Urinary Incontinence   Urinary incontinence (UI)  is when you lose control of your bladder  UI develops because your bladder cannot store or empty urine properly  The 3 most common types of UI are stress incontinence, urge incontinence, or both  Medicines:   · May be given to help strengthen your bladder control  Report any side effects of medication to your healthcare provider  Do pelvic muscle exercises often:  Your pelvic muscles help you stop urinating  Squeeze these muscles tight for 5 seconds, then relax for 5 seconds  Gradually work up to squeezing for 10 seconds  Do 3 sets of 15 repetitions a day, or as directed  This will help strengthen your pelvic muscles and improve bladder control  Train your bladder:  Go to the bathroom at set times, such as every 2 hours, even if you do not feel the urge to go  You can also try to hold your urine when you feel the urge to go  For example, hold your urine for 5 minutes when you feel the urge to go  As that becomes easier, hold your urine for 10 minutes  Self-care:   · Keep a UI record  Write down how often you leak urine and how much you leak  Make a note of what you were doing when you leaked urine  · Drink liquids as directed  You may need to limit the amount of liquid you drink to help control your urine leakage  Do not drink any liquid right before you go to bed  Limit or do not have drinks that contain caffeine or alcohol  · Prevent constipation  Eat a variety of high-fiber foods  Good examples are high-fiber cereals, beans, vegetables, and whole-grain breads  Walking is the best way to trigger your intestines to have a bowel movement  · Exercise regularly and maintain a healthy weight  Weight loss and exercise will decrease pressure on your bladder and help you control your leakage  · Use a catheter as directed  to help empty your bladder   A catheter is a tiny, plastic tube that is put into your bladder to drain your urine  · Go to behavior therapy as directed  Behavior therapy may be used to help you learn to control your urge to urinate  Cigarette Smoking and Your Health   Risks to your health if you smoke:  Nicotine and other chemicals found in tobacco damage every cell in your body  Even if you are a light smoker, you have an increased risk for cancer, heart disease, and lung disease  If you are pregnant or have diabetes, smoking increases your risk for complications  Benefits to your health if you stop smoking:   · You decrease respiratory symptoms such as coughing, wheezing, and shortness of breath  · You reduce your risk for cancers of the lung, mouth, throat, kidney, bladder, pancreas, stomach, and cervix  If you already have cancer, you increase the benefits of chemotherapy  You also reduce your risk for cancer returning or a second cancer from developing  · You reduce your risk for heart disease, blood clots, heart attack, and stroke  · You reduce your risk for lung infections, and diseases such as pneumonia, asthma, chronic bronchitis, and emphysema  · Your circulation improves  More oxygen can be delivered to your body  If you have diabetes, you lower your risk for complications, such as kidney, artery, and eye diseases  You also lower your risk for nerve damage  Nerve damage can lead to amputations, poor vision, and blindness  · You improve your body's ability to heal and to fight infections  For more information and support to stop smoking:   · [place]  gov  Phone: 1- 145 - 852-8828  Web Address: www [employer]  [address] [place] [address]  2018 Information is for End User's use only and may not be sold, redistributed or otherwise used for commercial purposes   All illustrations and images included in CareNotes® are the copyrighted property of A D A M , Inc  or [address] [place] [address]

## 2022-12-27 NOTE — ASSESSMENT & PLAN NOTE
Patient has pelvic mass  Previous ultrasound from July was reviewed  Radiologist recommended a repeat ultrasound in 6 months  I discussed this with her and offered to order the ultrasound  I reviewed her gynecologist notes  Patient never heard back from gynecology  She plans to call them and schedule through gynecology  I told the patient on to make sure this does not slip through the cracks  Patient promises she will contact her gynecologist for follow-up

## 2022-12-27 NOTE — PROGRESS NOTES
Assessment and Plan:     Problem List Items Addressed This Visit        Cardiovascular and Mediastinum    Essential hypertension, benign - Primary     Patient has hypertension  She tells me she would be willing to try a blood pressure medication again if this is what I recommend for her  She was very disappointed that her low-salt diet did not normalize her blood pressure  I did check her blood pressure again myself today  I found her blood pressure to be 150/90  I started the patient on lisinopril 10 mg daily  I scheduled the patient a follow-up visit for her blood pressure  Relevant Medications    lisinopril (ZESTRIL) 10 mg tablet       Other    Pure hypercholesterolemia     Patient has hyperlipidemia  I reviewed her labs  Her fasting blood glucose was 101  Creatinine was 0 68  LDL cholesterol is 187  Her goal is less than 100  She has a high ACC/AHA cardiovascular risk of 22 9%  I do recommend treatment for this as well  I told the patient I feel is likely hereditary but it should still be treated  Were going to try to work on getting her blood pressure down first and then address the cholesterol  Pelvic mass     Patient has pelvic mass  Previous ultrasound from July was reviewed  Radiologist recommended a repeat ultrasound in 6 months  I discussed this with her and offered to order the ultrasound  I reviewed her gynecologist notes  Patient never heard back from gynecology  She plans to call them and schedule through gynecology  I told the patient on to make sure this does not slip through the cracks  Patient promises she will contact her gynecologist for follow-up  Immunization not carried out because of patient decision     I offered the patient influenza vaccine today but she declined  I also offered the patient pneumococcal vaccine today but she declined  Lastly, I discussed getting a booster for COVID-19    I told her this is not something I carry at the office but I still recommended  She declined  Encounter for Medicare annual wellness exam    Mammogram declined     I offered to refer the patient for a mammogram but she declined  I explained to her that breast cancer is the most common cancer diagnosis in women and the second leading cause of cancer death in women  I highly recommend mammogram for the patient but she refused            Depression Screening and Follow-up Plan: Patient was screened for depression during today's encounter  They screened negative with a PHQ-2 score of 0  Urinary Incontinence Plan of Care: counseling topics discussed: practice Kegel (pelvic floor strengthening) exercises  Preventive health issues were discussed with patient, and age appropriate screening tests were ordered as noted in patient's After Visit Summary  Personalized health advice and appropriate referrals for health education or preventive services given if needed, as noted in patient's After Visit Summary  History of Present Illness:     Patient presents for a Medicare Wellness Visit    This patient is a 25-year-old white female who presents to the office today for her routine checkup as well as for her initial Medicare wellness exam   The patient tells me that she has been trying to follow a very strict low-salt diet  Her daughter continues to check her blood pressures at home  She did not have a written log book of her blood pressures with her  She tells me her blood pressure at home was 140/84  She was disappointed and her blood pressure was still so high today  She tells me she has been walking due to the weather  She gained 6 pounds  She is still smoking cigarettes  She smokes 4 to 5 packs of cigarettes per day  Patient Care Team:  Derik Delaney DO as PCP - General (Family Medicine)     Review of Systems:     Review of Systems   HENT: Positive for congestion and postnasal drip  Respiratory: Negative for cough and shortness of breath  Cardiovascular: Negative for chest pain, palpitations and leg swelling  Gastrointestinal: Negative for abdominal distention, abdominal pain, blood in stool, constipation, diarrhea and nausea  Genitourinary: Negative for pelvic pain  Problem List:     Patient Active Problem List   Diagnosis   • Other fatigue   • Essential hypertension, benign   • Pure hypercholesterolemia   • Liver hemangioma   • Pelvic mass   • Vaccine counseling   • Immunization not carried out because of patient decision   • Encounter for Medicare annual wellness exam   • Mammogram declined      Past Medical and Surgical History:     Past Medical History:   Diagnosis Date   • Allergic     seasonal     Past Surgical History:   Procedure Laterality Date   • COLONOSCOPY        Family History:     Family History   Problem Relation Age of Onset   • No Known Problems Mother    • Hypertension Father       Social History:     Social History     Socioeconomic History   • Marital status: /Civil Union     Spouse name: None   • Number of children: None   • Years of education: None   • Highest education level: None   Occupational History   • None   Tobacco Use   • Smoking status: Every Day     Packs/day: 0 25     Years: 50 00     Pack years: 12 50     Types: Cigarettes     Start date: 1972   • Smokeless tobacco: Never   Vaping Use   • Vaping Use: Never used   Substance and Sexual Activity   • Alcohol use: Not Currently     Comment: rare   • Drug use: Never   • Sexual activity: Yes   Other Topics Concern   • None   Social History Narrative   • None     Social Determinants of Health     Financial Resource Strain: Low Risk    • Difficulty of Paying Living Expenses: Not hard at all   Food Insecurity: Not on file   Transportation Needs: No Transportation Needs   • Lack of Transportation (Medical): No   • Lack of Transportation (Non-Medical):  No   Physical Activity: Not on file   Stress: Not on file   Social Connections: Not on file   Intimate Partner Violence: Not on file   Housing Stability: Not on file      Medications and Allergies:     Current Outpatient Medications   Medication Sig Dispense Refill   • Ascorbic Acid (VITAMIN C PO) Take by mouth     • lisinopril (ZESTRIL) 10 mg tablet Take 1 tablet (10 mg total) by mouth daily 30 tablet 5   • Multiple Vitamins-Minerals (ZINC PO) Take by mouth     • VITAMIN D PO Take by mouth       No current facility-administered medications for this visit  Allergies   Allergen Reactions   • Sulfa Antibiotics Hives     Palpitations of heart      Immunizations:     Immunization History   Administered Date(s) Administered   • COVID-19 J&J (Neema) vaccine 0 5 mL 09/20/2021      Health Maintenance:         Topic Date Due   • Breast Cancer Screening: Mammogram  Never done   • Hepatitis C Screening  05/26/2023 (Originally 1955)   • Colorectal Cancer Screening  04/09/2026         Topic Date Due   • Hepatitis B Vaccine (1 of 3 - 3-dose series) Never done   • Pneumococcal Vaccine: 65+ Years (1 - PCV) Never done   • COVID-19 Vaccine (2 - Booster for Neema series) 11/15/2021   • Influenza Vaccine (1) Never done      Medicare Screening Tests and Risk Assessments: Ran Morales is here for her Initial Wellness visit  Health Risk Assessment:   Patient rates overall health as good  Patient feels that their physical health rating is same  Patient is satisfied with their life  Eyesight was rated as same  Hearing was rated as same  Patient feels that their emotional and mental health rating is same  Patients states they are sometimes angry  Patient states they are sometimes unusually tired/fatigued  Pain experienced in the last 7 days has been none  Patient states that she has experienced no weight loss or gain in last 6 months  Depression Screening:   PHQ-2 Score: 0      Fall Risk Screening:    In the past year, patient has experienced: no history of falling in past year      Urinary Incontinence Screening: Patient has leaked urine accidently in the last six months  Home Safety:  Patient does not have trouble with stairs inside or outside of their home  Patient has working smoke alarms and has working carbon monoxide detector  Home safety hazards include: none  Nutrition:   Current diet is No Added Salt  Medications:   Patient is currently taking over-the-counter supplements  OTC medications include: see medication list  Patient is able to manage medications  Activities of Daily Living (ADLs)/Instrumental Activities of Daily Living (IADLs):   Walk and transfer into and out of bed and chair?: Yes  Dress and groom yourself?: Yes    Bathe or shower yourself?: Yes    Feed yourself? Yes  Do your laundry/housekeeping?: Yes  Manage your money, pay your bills and track your expenses?: Yes  Make your own meals?: Yes    Do your own shopping?: Yes    Previous Hospitalizations:   Any hospitalizations or ED visits within the last 12 months?: Yes    How many hospitalizations have you had in the last year?: 1-2    Advance Care Planning:   Living will: Yes    Durable POA for healthcare:  Yes    Advanced directive: Yes      Cognitive Screening:   Provider or family/friend/caregiver concerned regarding cognition?: No    PREVENTIVE SCREENINGS      Cardiovascular Screening:    General: Screening Not Indicated and History Lipid Disorder      Diabetes Screening:     General: Screening Current      Colorectal Cancer Screening:     General: Screening Current      Breast Cancer Screening:     General: Risks and Benefits Discussed and Patient Declines      Cervical Cancer Screening:    General: Screening Not Indicated      Osteoporosis Screening:    General: Risks and Benefits Discussed and Patient Declines      Abdominal Aortic Aneurysm (AAA) Screening:        General: Screening Not Indicated      Lung Cancer Screening:     General: Screening Not Indicated      Hepatitis C Screening:    General: Screening Current    Screening, Brief Intervention, and Referral to Treatment (SBIRT)    Screening  Typical number of drinks in a day: 0  Typical number of drinks in a week: 0  Interpretation: Low risk drinking behavior  AUDIT-C Screenin) How often did you have a drink containing alcohol in the past year? never  2) How many drinks did you have on a typical day when you were drinking in the past year? 0  3) How often did you have 6 or more drinks on one occasion in the past year? never    AUDIT-C Score: 0  Interpretation: Score 0-2 (female): Negative screen for alcohol misuse    Single Item Drug Screening:  How often have you used an illegal drug (including marijuana) or a prescription medication for non-medical reasons in the past year? never    Single Item Drug Screen Score: 0  Interpretation: Negative screen for possible drug use disorder    No results found  Physical Exam:     /100   Pulse 101   Temp (!) 97 3 °F (36 3 °C) (Tympanic)   Ht 5' 5" (1 651 m)   Wt 62 9 kg (138 lb 9 6 oz)   LMP  (LMP Unknown)   SpO2 97%   BMI 23 06 kg/m²     Physical Exam  Vitals reviewed  Constitutional:       Comments: This patient is a 25-year-old white female who appears her stated age  The patient is pleasant, cooperative, and in no distress   HENT:      Head: Normocephalic and atraumatic  Right Ear: Tympanic membrane, ear canal and external ear normal  There is no impacted cerumen  Left Ear: Tympanic membrane, ear canal and external ear normal  There is no impacted cerumen  Mouth/Throat:      Mouth: Mucous membranes are moist       Pharynx: Oropharynx is clear  No oropharyngeal exudate or posterior oropharyngeal erythema  Eyes:      General: No scleral icterus  Right eye: No discharge  Left eye: No discharge  Conjunctiva/sclera: Conjunctivae normal       Pupils: Pupils are equal, round, and reactive to light  Neck:      Vascular: No carotid bruit        Comments: No thyromegaly noted  Cardiovascular:      Rate and Rhythm: Normal rate and regular rhythm  Heart sounds: Normal heart sounds  No murmur heard  No friction rub  No gallop  Pulmonary:      Effort: Pulmonary effort is normal  No respiratory distress  Breath sounds: Normal breath sounds  No stridor  No wheezing, rhonchi or rales  Abdominal:      General: Bowel sounds are normal  There is no distension  Palpations: Abdomen is soft  There is no mass  Tenderness: There is no abdominal tenderness  There is no guarding  Comments: No organomegaly noted   Musculoskeletal:      Cervical back: Neck supple  Lymphadenopathy:      Cervical: No cervical adenopathy  Psychiatric:         Mood and Affect: Mood normal          Behavior: Behavior normal          Thought Content:  Thought content normal          Judgment: Judgment normal      Extremities: Without cyanosis, clubbing, or edema    Virgin Jean Baptiste, DO

## 2022-12-27 NOTE — ASSESSMENT & PLAN NOTE
I offered the patient influenza vaccine today but she declined  I also offered the patient pneumococcal vaccine today but she declined  Lastly, I discussed getting a booster for COVID-19  I told her this is not something I carry at the office but I still recommended  She declined

## 2022-12-27 NOTE — ASSESSMENT & PLAN NOTE
I offered to refer the patient for a mammogram but she declined  I explained to her that breast cancer is the most common cancer diagnosis in women and the second leading cause of cancer death in women    I highly recommend mammogram for the patient but she refused

## 2022-12-27 NOTE — ASSESSMENT & PLAN NOTE
Patient has hyperlipidemia  I reviewed her labs  Her fasting blood glucose was 101  Creatinine was 0 68  LDL cholesterol is 187  Her goal is less than 100  She has a high ACC/AHA cardiovascular risk of 22 9%  I do recommend treatment for this as well  I told the patient I feel is likely hereditary but it should still be treated  Were going to try to work on getting her blood pressure down first and then address the cholesterol

## 2022-12-27 NOTE — ASSESSMENT & PLAN NOTE
Patient has hypertension  She tells me she would be willing to try a blood pressure medication again if this is what I recommend for her  She was very disappointed that her low-salt diet did not normalize her blood pressure  I did check her blood pressure again myself today  I found her blood pressure to be 150/90  I started the patient on lisinopril 10 mg daily  I scheduled the patient a follow-up visit for her blood pressure

## 2023-02-17 DIAGNOSIS — I10 ESSENTIAL HYPERTENSION, BENIGN: ICD-10-CM

## 2023-02-17 RX ORDER — LISINOPRIL 10 MG/1
TABLET ORAL
Qty: 90 TABLET | Refills: 2 | Status: SHIPPED | OUTPATIENT
Start: 2023-02-17

## 2023-02-25 PROBLEM — Z00.00 ENCOUNTER FOR MEDICARE ANNUAL WELLNESS EXAM: Status: RESOLVED | Noted: 2022-12-27 | Resolved: 2023-02-25

## 2023-03-09 ENCOUNTER — APPOINTMENT (EMERGENCY)
Dept: CT IMAGING | Facility: HOSPITAL | Age: 68
End: 2023-03-09

## 2023-03-09 ENCOUNTER — HOSPITAL ENCOUNTER (EMERGENCY)
Facility: HOSPITAL | Age: 68
Discharge: HOME | End: 2023-03-09
Attending: EMERGENCY MEDICINE | Admitting: EMERGENCY MEDICINE

## 2023-03-09 ENCOUNTER — TELEPHONE (OUTPATIENT)
Dept: OTHER | Facility: HOSPITAL | Age: 68
End: 2023-03-09

## 2023-03-09 ENCOUNTER — APPOINTMENT (EMERGENCY)
Dept: ULTRASOUND IMAGING | Facility: HOSPITAL | Age: 68
End: 2023-03-09

## 2023-03-09 ENCOUNTER — CLINICAL SUPPORT (OUTPATIENT)
Dept: FAMILY MEDICINE CLINIC | Facility: CLINIC | Age: 68
End: 2023-03-09

## 2023-03-09 VITALS
DIASTOLIC BLOOD PRESSURE: 122 MMHG | SYSTOLIC BLOOD PRESSURE: 159 MMHG | BODY MASS INDEX: 22.86 KG/M2 | WEIGHT: 137.35 LBS | OXYGEN SATURATION: 100 % | RESPIRATION RATE: 13 BRPM | HEART RATE: 77 BPM | TEMPERATURE: 98.7 F

## 2023-03-09 VITALS
DIASTOLIC BLOOD PRESSURE: 97 MMHG | HEART RATE: 113 BPM | TEMPERATURE: 99.1 F | SYSTOLIC BLOOD PRESSURE: 200 MMHG | OXYGEN SATURATION: 99 %

## 2023-03-09 DIAGNOSIS — R42 LIGHTHEADEDNESS: Primary | ICD-10-CM

## 2023-03-09 DIAGNOSIS — N39.0 UTI (URINARY TRACT INFECTION): Primary | ICD-10-CM

## 2023-03-09 DIAGNOSIS — R93.89 ABNORMAL PELVIC ULTRASOUND: ICD-10-CM

## 2023-03-09 DIAGNOSIS — R03.0 ELEVATED BLOOD PRESSURE READING: ICD-10-CM

## 2023-03-09 DIAGNOSIS — I73.9 PVD (PERIPHERAL VASCULAR DISEASE) (HCC): ICD-10-CM

## 2023-03-09 DIAGNOSIS — N20.0 RENAL CALCULI: ICD-10-CM

## 2023-03-09 LAB
ALBUMIN SERPL BCP-MCNC: 3.9 G/DL (ref 3.5–5)
ALP SERPL-CCNC: 44 U/L (ref 34–104)
ALT SERPL W P-5'-P-CCNC: 8 U/L (ref 7–52)
ANION GAP SERPL CALCULATED.3IONS-SCNC: 6 MMOL/L (ref 4–13)
AST SERPL W P-5'-P-CCNC: 11 U/L (ref 13–39)
BACTERIA UR QL AUTO: ABNORMAL /HPF
BASOPHILS # BLD AUTO: 0.03 THOUSANDS/ÂΜL (ref 0–0.1)
BASOPHILS NFR BLD AUTO: 1 % (ref 0–1)
BILIRUB DIRECT SERPL-MCNC: 0.07 MG/DL (ref 0–0.2)
BILIRUB SERPL-MCNC: 0.32 MG/DL (ref 0.2–1)
BILIRUB UR QL STRIP: NEGATIVE
BUN SERPL-MCNC: 25 MG/DL (ref 5–25)
CALCIUM SERPL-MCNC: 9.3 MG/DL (ref 8.4–10.2)
CARDIAC TROPONIN I PNL SERPL HS: 7 NG/L (ref 8–18)
CHLORIDE SERPL-SCNC: 111 MMOL/L (ref 96–108)
CLARITY UR: ABNORMAL
CO2 SERPL-SCNC: 25 MMOL/L (ref 21–32)
COLOR UR: YELLOW
CREAT SERPL-MCNC: 0.89 MG/DL (ref 0.6–1.3)
EOSINOPHIL # BLD AUTO: 0.13 THOUSAND/ÂΜL (ref 0–0.61)
EOSINOPHIL NFR BLD AUTO: 2 % (ref 0–6)
ERYTHROCYTE [DISTWIDTH] IN BLOOD BY AUTOMATED COUNT: 13.2 % (ref 11.6–15.1)
FLUAV RNA RESP QL NAA+PROBE: NEGATIVE
FLUBV RNA RESP QL NAA+PROBE: NEGATIVE
GFR SERPL CREATININE-BSD FRML MDRD: 67 ML/MIN/1.73SQ M
GLUCOSE SERPL-MCNC: 109 MG/DL (ref 65–140)
GLUCOSE UR STRIP-MCNC: NEGATIVE MG/DL
HCT VFR BLD AUTO: 35.8 % (ref 34.8–46.1)
HGB BLD-MCNC: 11.8 G/DL (ref 11.5–15.4)
HGB UR QL STRIP.AUTO: ABNORMAL
IMM GRANULOCYTES # BLD AUTO: 0.02 THOUSAND/UL (ref 0–0.2)
IMM GRANULOCYTES NFR BLD AUTO: 0 % (ref 0–2)
KETONES UR STRIP-MCNC: NEGATIVE MG/DL
LACTATE SERPL-SCNC: 0.8 MMOL/L (ref 0.5–2)
LEUKOCYTE ESTERASE UR QL STRIP: ABNORMAL
LIPASE SERPL-CCNC: 39 U/L (ref 11–82)
LYMPHOCYTES # BLD AUTO: 1.53 THOUSANDS/ÂΜL (ref 0.6–4.47)
LYMPHOCYTES NFR BLD AUTO: 23 % (ref 14–44)
MCH RBC QN AUTO: 30.2 PG (ref 26.8–34.3)
MCHC RBC AUTO-ENTMCNC: 33 G/DL (ref 31.4–37.4)
MCV RBC AUTO: 92 FL (ref 82–98)
MONOCYTES # BLD AUTO: 0.4 THOUSAND/ÂΜL (ref 0.17–1.22)
MONOCYTES NFR BLD AUTO: 6 % (ref 4–12)
NEUTROPHILS # BLD AUTO: 4.47 THOUSANDS/ÂΜL (ref 1.85–7.62)
NEUTS SEG NFR BLD AUTO: 68 % (ref 43–75)
NITRITE UR QL STRIP: POSITIVE
NON-SQ EPI CELLS URNS QL MICRO: ABNORMAL /HPF
NRBC BLD AUTO-RTO: 0 /100 WBCS
PH UR STRIP.AUTO: 6 [PH]
PLATELET # BLD AUTO: 355 THOUSANDS/UL (ref 149–390)
PMV BLD AUTO: 8.9 FL (ref 8.9–12.7)
POTASSIUM SERPL-SCNC: 3.7 MMOL/L (ref 3.5–5.3)
PROT SERPL-MCNC: 6.6 G/DL (ref 6.4–8.4)
PROT UR STRIP-MCNC: ABNORMAL MG/DL
RBC # BLD AUTO: 3.91 MILLION/UL (ref 3.81–5.12)
RBC #/AREA URNS AUTO: ABNORMAL /HPF
RSV RNA RESP QL NAA+PROBE: NEGATIVE
SARS-COV-2 RNA RESP QL NAA+PROBE: NEGATIVE
SODIUM SERPL-SCNC: 142 MMOL/L (ref 135–147)
SP GR UR STRIP.AUTO: 1.02 (ref 1–1.03)
UROBILINOGEN UR QL STRIP.AUTO: 0.2 E.U./DL
WBC # BLD AUTO: 6.58 THOUSAND/UL (ref 4.31–10.16)
WBC #/AREA URNS AUTO: ABNORMAL /HPF

## 2023-03-09 RX ORDER — KETOROLAC TROMETHAMINE 30 MG/ML
15 INJECTION, SOLUTION INTRAMUSCULAR; INTRAVENOUS ONCE
Status: DISCONTINUED | OUTPATIENT
Start: 2023-03-09 | End: 2023-03-10 | Stop reason: HOSPADM

## 2023-03-09 RX ORDER — CEFTRIAXONE 1 G/50ML
1000 INJECTION, SOLUTION INTRAVENOUS ONCE
Status: COMPLETED | OUTPATIENT
Start: 2023-03-09 | End: 2023-03-09

## 2023-03-09 RX ORDER — ONDANSETRON 4 MG/1
4 TABLET, ORALLY DISINTEGRATING ORAL EVERY 8 HOURS PRN
Qty: 20 TABLET | Refills: 0 | Status: SHIPPED | OUTPATIENT
Start: 2023-03-09

## 2023-03-09 RX ORDER — CEPHALEXIN 500 MG/1
500 CAPSULE ORAL EVERY 6 HOURS SCHEDULED
Qty: 28 CAPSULE | Refills: 0 | Status: SHIPPED | OUTPATIENT
Start: 2023-03-09 | End: 2023-03-16

## 2023-03-09 RX ADMIN — SODIUM CHLORIDE, SODIUM LACTATE, POTASSIUM CHLORIDE, AND CALCIUM CHLORIDE 1000 ML: .6; .31; .03; .02 INJECTION, SOLUTION INTRAVENOUS at 18:48

## 2023-03-09 RX ADMIN — CEFTRIAXONE 1000 MG: 1 INJECTION, SOLUTION INTRAVENOUS at 21:56

## 2023-03-09 RX ADMIN — IOHEXOL 100 ML: 350 INJECTION, SOLUTION INTRAVENOUS at 20:09

## 2023-03-09 NOTE — PROGRESS NOTES
Showed Dr Ronald House the blood pressure and pulse readings  Pt is also having tingling, coldness, and numbness in her fingertips  Due to the symptoms and the high blood pressure reading pt was informed to go to the emergency room  Pt was in agreement

## 2023-03-09 NOTE — ED PROVIDER NOTES
History  Chief Complaint   Patient presents with   • Dizziness     Dizzy lightheaded and sweaty this am finger tips numb this am  Bp elevated     79-year-old female comes in at the recommendation of her family doctor for elevated blood pressures dizziness lightheadedness intermittent sweats  Symptoms started this morning when she woke up  She noted bilateral fingertips were tingly and she had sweaty palms she otherwise denies any other numbness or paresthesias she felt cold and has had chills intermittently throughout the day  Patient denies any headache visual disturbance difficulties with speech no neck pain no weakness but has been dizzy and lightheaded  It did not stop her from doing activities of daily living she was able to do dishes and vacuum without any difficulty symptoms persisted into this afternoon so she went over to her doctor's office her blood pressure was checked there and found to be 200/99 it was recommended that she come in to get seen  She did have a fever 2 nights ago she denies any cough or upper respiratory complaints no chest pain shortness of breath or pleuritic pain  She has had some very mild pain to the right inguinal region denies any flank pain has had no gross hematuria or urgency she does have increased urinary frequency she describes the right inguinal pain as "annoying" she otherwise denies abdominal pain diet has been intact  No trauma or falls; Past medical history significant for hypertension has been on lisinopril 10 mg daily since January of this year hyperlipidemia 8 5 cm right cystic mass in the right adnexal region last time it was evaluated was August she is lost to follow-up kidney stones and 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 Taking?    Ascorbic Acid (VITAMIN C PO) Yes No   Sig: Take by mouth   Multiple Vitamins-Minerals (ZINC PO)   Yes No   Sig: Take by mouth   VITAMIN D PO   Yes No   Sig: Take by mouth   lisinopril (ZESTRIL) 10 mg tablet   No No   Sig: TAKE 1 TABLET BY MOUTH EVERY DAY      Facility-Administered Medications: None       Past Medical History:   Diagnosis Date   • Allergic     seasonal       Past Surgical History:   Procedure Laterality Date   • COLONOSCOPY         Family History   Problem Relation Age of Onset   • No Known Problems Mother    • Hypertension Father      I have reviewed and agree with the history as documented  E-Cigarette/Vaping   • E-Cigarette Use Never User      E-Cigarette/Vaping Substances   • Nicotine No    • THC No    • CBD No    • Flavoring No    • Other No    • Unknown No      Social History     Tobacco Use   • Smoking status: Every Day     Packs/day: 0 25     Years: 50 00     Pack years: 12 50     Types: Cigarettes     Start date: 0   • Smokeless tobacco: Never   Vaping Use   • Vaping Use: Never used   Substance Use Topics   • Alcohol use: Not Currently     Comment: rare   • Drug use: Never       Review of Systems   Constitutional: Positive for chills, diaphoresis and fever (2 days ago)  Negative for activity change, appetite change and fatigue  HENT: Negative for congestion, ear pain, rhinorrhea, sneezing and sore throat  Eyes: Negative for discharge and visual disturbance  Respiratory: Negative for cough and shortness of breath  Cardiovascular: Negative for chest pain and leg swelling  Gastrointestinal: Positive for abdominal pain (rt inginual region)  Negative for blood in stool, diarrhea, nausea and vomiting  Endocrine: Negative for polyuria  Genitourinary: Negative for difficulty urinating, dysuria, frequency and urgency  Musculoskeletal: Negative for back pain and myalgias  Skin: Negative for rash  Neurological: Positive for dizziness, light-headedness and numbness (bilateral finger tips)   Negative for facial asymmetry, speech difficulty, weakness and headaches  Hematological: Negative for adenopathy  Psychiatric/Behavioral: Negative for confusion  All other systems reviewed and are negative  Physical Exam  Physical Exam  Vitals and nursing note reviewed  Constitutional:       General: She is not in acute distress  Appearance: She is not ill-appearing, toxic-appearing or diaphoretic  Comments: [email or web address]   HENT:      Head: Normocephalic  Right Ear: Tympanic membrane, ear canal and external ear normal  There is no impacted cerumen  Left Ear: Tympanic membrane and external ear normal  There is no impacted cerumen  Nose: Nose normal  No congestion or rhinorrhea  Mouth/Throat:      Mouth: Mucous membranes are moist       Pharynx: No oropharyngeal exudate or posterior oropharyngeal erythema  Eyes:      General:         Right eye: No discharge  Left eye: No discharge  Extraocular Movements: Extraocular movements intact  Conjunctiva/sclera: Conjunctivae normal       Pupils: Pupils are equal, round, and reactive to light  Comments: Visual fields intact to direct confrontation   Cardiovascular:      Rate and Rhythm: Normal rate and regular rhythm  Pulses: Normal pulses  Pulmonary:      Effort: Pulmonary effort is normal  No respiratory distress  Breath sounds: Normal breath sounds  No stridor  No wheezing, rhonchi or rales  Abdominal:      General: Bowel sounds are normal  There is no distension  Palpations: Abdomen is soft  There is no mass  Tenderness: There is no abdominal tenderness  There is no right CVA tenderness, left CVA tenderness or guarding  Comments: No reproducible abdm tenderness   Musculoskeletal:         General: Normal range of motion  Cervical back: Normal range of motion and neck supple  No rigidity or tenderness  Right lower leg: No edema  Left lower leg: No edema     Lymphadenopathy: Cervical: No cervical adenopathy  Skin:     General: Skin is warm and dry  Capillary Refill: Capillary refill takes less than 2 seconds  Findings: No rash  Neurological:      General: No focal deficit present  Mental Status: She is alert and oriented to person, place, and time  Cranial Nerves: No cranial nerve deficit  Sensory: No sensory deficit  Motor: No weakness        Coordination: Coordination normal       Deep Tendon Reflexes: Reflexes normal       Comments: gcs 15; no pronator drift equal handgrips toes are downgoing bilaterally heel-to-shin intact         Vital Signs  ED Triage Vitals [03/09/23 1635]   Temperature Pulse Respirations Blood Pressure SpO2   98 7 °F (37 1 °C) 105 18 (!) 182/106 97 %      Temp Source Heart Rate Source Patient Position - Orthostatic VS BP Location FiO2 (%)   Temporal Monitor Sitting Left arm --      Pain Score       No Pain           Vitals:    03/09/23 1635 03/09/23 1838 03/09/23 2030 03/09/23 2224   BP: (!) 182/106 167/85 162/71 (!) 159/122   Pulse: 105 83  77   Patient Position - Orthostatic VS: Sitting Sitting Sitting Sitting         Visual Acuity      ED Medications  Medications   lactated ringers bolus 1,000 mL (0 mL Intravenous Stopped 3/9/23 2022)   iohexol (OMNIPAQUE) 350 MG/ML injection (SINGLE-DOSE) 100 mL (100 mL Intravenous Given 3/9/23 2009)   cefTRIAXone (ROCEPHIN) IVPB (premix in dextrose) 1,000 mg 50 mL (0 mg Intravenous Stopped 3/9/23 2242)       Diagnostic Studies  Results Reviewed     Procedure Component Value Units Date/Time    FLU/RSV/COVID - if FLU/RSV clinically relevant [[id number]]  (Normal) Collected: 03/09/23 1847    Lab Status: Final result Specimen: Nares from Nose Updated: 03/09/23 1939     SARS-CoV-2 Negative     INFLUENZA A PCR Negative     INFLUENZA B PCR Negative     RSV PCR Negative    Narrative:      FOR PEDIATRIC PATIENTS - copy/paste COVID Guidelines URL to browser: [email or web address]    SARS-CoV-2 assay is a Nucleic Acid Amplification assay intended for the  qualitative detection of nucleic acid from SARS-CoV-2 in nasopharyngeal  swabs  Results are for the presumptive identification of SARS-CoV-2 RNA  Positive results are indicative of infection with SARS-CoV-2, the virus  causing COVID-19, but do not rule out bacterial infection or co-infection  with other viruses  Laboratories within the [place] and its  territories are required to report all positive results to the appropriate  public health authorities  Negative results do not preclude SARS-CoV-2  infection and should not be used as the sole basis for treatment or other  patient management decisions  Negative results must be combined with  clinical observations, patient history, and epidemiological information  This test has not been FDA cleared or approved  This test has been authorized by FDA under an Emergency Use Authorization  (EUA)  This test is only authorized for the duration of time the  declaration that circumstances exist justifying the authorization of the  emergency use of an in vitro diagnostic tests for detection of SARS-CoV-2  virus and/or diagnosis of COVID-19 infection under section 564(b)(1) of  the Act, 21 U  S C  256BOG-1(X)(6), unless the authorization is terminated  or revoked sooner  The test has been validated but independent review by FDA  and CLIA is pending  Test performed using [employer] GeneXpert: This RT-PCR assay targets N2,  a region unique to SARS-CoV-2  A conserved region in the E-gene was chosen  for pan-Sarbecovirus detection which includes SARS-CoV-2  According to CMS-2020-01-R, this platform meets the definition of high-throughput technology      High Sensitivity Troponin I Random [[id number]]  (Abnormal) Collected: 03/09/23 1847    Lab Status: Final result Specimen: Blood from Arm, Right Updated: 03/09/23 1926 HS TnI random 7 ng/L     Basic metabolic panel [[id number]]  (Abnormal) Collected: 03/09/23 1847    Lab Status: Final result Specimen: Blood from Arm, Right Updated: 03/09/23 1922     Sodium 142 mmol/L      Potassium 3 7 mmol/L      Chloride 111 mmol/L      CO2 25 mmol/L      ANION GAP 6 mmol/L      BUN 25 mg/dL      Creatinine 0 89 mg/dL      Glucose 109 mg/dL      Calcium 9 3 mg/dL      eGFR 67 ml/min/1 73sq m     Narrative:      [place] guidelines for Chronic Kidney Disease (CKD):   •  Stage 1 with normal or high GFR (GFR > 90 mL/min/1 73 square meters)  •  Stage 2 Mild CKD (GFR = 60-89 mL/min/1 73 square meters)  •  Stage 3A Moderate CKD (GFR = 45-59 mL/min/1 73 square meters)  •  Stage 3B Moderate CKD (GFR = 30-44 mL/min/1 73 square meters)  •  Stage 4 Severe CKD (GFR = 15-29 mL/min/1 73 square meters)  •  Stage 5 End Stage CKD (GFR <15 mL/min/1 73 square meters)  Note: GFR calculation is accurate only with a steady state creatinine    Hepatic function panel [[id number]]  (Abnormal) Collected: 03/09/23 1847    Lab Status: Final result Specimen: Blood from Arm, Right Updated: 03/09/23 1922     Total Bilirubin 0 32 mg/dL      Bilirubin, Direct 0 07 mg/dL      Alkaline Phosphatase 44 U/L      AST 11 U/L      ALT 8 U/L      Total Protein 6 6 g/dL      Albumin 3 9 g/dL     Lipase [[id number]]  (Normal) Collected: 03/09/23 1847    Lab Status: Final result Specimen: Blood from Arm, Right Updated: 03/09/23 1922     Lipase 39 u/L     Lactic acid [[id number]]  (Normal) Collected: 03/09/23 1847    Lab Status: Final result Specimen: Blood from Arm, Right Updated: 03/09/23 1919     LACTIC ACID 0 8 mmol/L     Narrative:      Result may be elevated if tourniquet was used during collection      Urine Microscopic [[id number]]  (Abnormal) Collected: 03/09/23 1847    Lab Status: Final result Specimen: Urine, Clean Catch Updated: 03/09/23 1918     RBC, UA 4-10 /hpf      WBC, UA 30-50 /hpf      Epithelial Cells Occasional /hpf      Bacteria, UA Innumerable /hpf     Urine culture [[id number]] Collected: 03/09/23 1847    Lab Status: In process Specimen: Urine, Clean Catch Updated: 03/09/23 1918    UA w Reflex to Microscopic w Reflex to Culture [[id number]]  (Abnormal) Collected: 03/09/23 1847    Lab Status: Final result Specimen: Urine, Clean Catch Updated: 03/09/23 1906     Color, UA Yellow     Clarity, UA Cloudy     Specific Gravity, UA 1 025     pH, UA 6 0     Leukocytes, UA Moderate     Nitrite, UA Positive     Protein, UA Trace mg/dl      Glucose, UA Negative mg/dl      Ketones, UA Negative mg/dl      Urobilinogen, UA 0 2 E U /dl      Bilirubin, UA Negative     Occult Blood, UA Moderate    CBC and differential [[id number]] Collected: 03/09/23 1847    Lab Status: Final result Specimen: Blood from Arm, Right Updated: 03/09/23 1858     WBC 6 58 Thousand/uL      RBC 3 91 Million/uL      Hemoglobin 11 8 g/dL      Hematocrit 35 8 %      MCV 92 fL      MCH 30 2 pg      MCHC 33 0 g/dL      RDW 13 2 %      MPV 8 9 fL      Platelets 510 Thousands/uL      nRBC 0 /100 WBCs      Neutrophils Relative 68 %      Immat GRANS % 0 %      Lymphocytes Relative 23 %      Monocytes Relative 6 %      Eosinophils Relative 2 %      Basophils Relative 1 %      Neutrophils Absolute 4 47 Thousands/µL      Immature Grans Absolute 0 02 Thousand/uL      Lymphocytes Absolute 1 53 Thousands/µL      Monocytes Absolute 0 40 Thousand/µL      Eosinophils Absolute 0 13 Thousand/µL      Basophils Absolute 0 03 Thousands/µL                  CT abdomen pelvis w wo contrast   Final Result by Guerita Roberto MD (03/09 2128)   Addendum (preliminary) 1 of 1 by uGerita Roberto MD (03/09 2128)   ADDENDUM:      There is also a small right inguinal hernia containing a short,    nonobstructed loop of small bowel as in July 2022  Final      Two of the previously seen right-sided intrarenal calculi measuring up to 7 mm have migrated into the renal pelvis    Moderate urothelial thickening in the right collecting system, likely infectious  No evidence of pyelonephritis or perinephric abscess  No ureteral calculi or hydronephrosis  Droplet of air in the urinary bladder, which in the absence of recent instrumentation suggests cystitis  No other acute abdominal pelvic pathology  Stable right renal artery aneurysm  9 3 x 6 8 cm cystic lesion in the right adnexa better assessed on the earlier pelvic ultrasound  Additional findings as above  Workstation performed: [id number]         US pelvis complete w transvaginal   Final Result by Pipe Bruce MD (03/09 1922)       1  Stable septated right ovarian cyst  Based on the ACR O-RADS system, this is O-RADS category 3 (low-risk with 1% to <10% risk of malignancy ) The management recommendation is management by gynecologist  MRI may be of use for further evaluation  2   Stable focal thickening and cystic change along the posterior endometrium with surrounding fluid  This could represent a sessile polyp though definitive characterization with sampling is recommended as neoplasm cannot be excluded, though stability    since July 2022 is reassuring for an indolent process  3   Fibroid uterus  The study was marked in [facility] for immediate notification            Workstation performed: [id number]                    Procedures  ECG 12 Lead Documentation Only    Date/Time: 3/9/2023 4:40 PM  Performed by: Esther Calderon MD  Authorized by: Esther Calderon MD     Indications / Diagnosis:  Dizzy  ECG reviewed by me, the ED Provider: yes    Patient location:  ED  Previous ECG:     Previous ECG:  Unavailable (no prev)  Rate:     ECG rate:  103    ECG rate assessment: tachycardic    Rhythm:     Rhythm: sinus tachycardia    QRS:     QRS axis:  Normal  Comments:      No acute ischemic changes             ED Course  ED Course as of 03/10/23 0324   Thu Mar 09, 2023   2147 Urine culture 8/24/22 E coli pansensitive will dose with rocephin    8480 Extensively reviewed CT findings with patient will need close outpt followup with urology gyn and vascular will place outpt referrals verbalized importance of followup also reviewed with daughter   Gerald Costello Case disccussed with David Coon of urology to arrange close outpt folloup ok with rocepin cephallexin   [phone] Rt PIV d/c'ed by me prior to discharge                  Stroke Assessment     Row Name 03/09/23 1753             NIH Stroke Scale    Interval Baseline      Level of Consciousness (1a ) 0      LOC Questions (1b ) 0      LOC Commands (1c ) 0      Best Gaze (2 ) 0      Visual (3 ) 0      Facial Palsy (4 ) 0      Motor Arm, Left (5a ) 0      Motor Arm, Right (5b ) 0      Motor Leg, Left (6a ) 0      Motor Leg, Right (6b ) 0      Limb Ataxia (7 ) 0      Sensory (8 ) 0      Best Language (9 ) 0      Dysarthria (10 ) --      Extinction and Inattention (11 ) (Formerly Neglect) 0      Total --              Flowsheet Row Most Recent Value   Thrombolytic Decision Options    Thrombolytic Decision Patient not a candidate  Patient is not a candidate options Unclear time of onset outside appropriate time window , Symptoms resolved/clearly non disabling  Medical Decision Making  Mdm: Patient with nonspecific complaints of intermittent sweats bilateral  Fingertip a fever 2 days ago chills and right inguinal discomfort which is mild  This time neurologic exam is nonfocal with NIH of 0 based on distribution of the numbness and paresthesias is not consistent with a neurologic problem or stroke  Concern for underlying infection  Patient has a known history of pelvic mass prior history of right hydronephrosis we will plan on repeat CT a/p with and wo contrast  to evaluate for possibility of kidney stone and due to the fact she is lost to follow-up we will recheck pelvic ultrasound to assess the right cystic mass  Patient will be hydrated and administer Toradol for pain    Recheck BP was 155/82    Amount and/or Complexity of Data Reviewed  Labs: ordered  Radiology: ordered  Risk  Prescription drug management  Disposition  Final diagnoses:   UTI (urinary tract infection) - moderate urothelial thickening in the right renal pelvis and the calyces   Renal calculi - rt renal pelvis   PVD (peripheral vascular disease) ([facility]) - rt renal anerysm sacular 1 5cm; mostly thrombosed 9mm splenic artery anerysm   Abnormal pelvic ultrasound - sepated rt ovarian cyst; Stable focal thickening and cystic change along the posterior endometrium with surrounding fluid   This could represent a sessile polyp though definitive characterization with sampling is recommended as neoplasm cannot be excluded, though stability , since July 2022      Time reflects when diagnosis was documented in both MDM as applicable and the Disposition within this note     Time User Action Codes Description Comment    3/9/2023 10:16 PM [place] Caldron Add [N39 0] UTI (urinary tract infection)     3/9/2023 10:19 PM Katelin Pierce Modify [N39 0] UTI (urinary tract infection) moderate urothelial thickening in the right renal pelvis and the calyces    3/9/2023 10:19 PM Alejandro Caldron Add [N20 0] Renal calculi     3/9/2023 10:19 PM Alejandro Caldron Modify [N20 0] Renal calculi rt renal pelvis    3/9/2023 10:19 PM Neris Meres [I73 9] PVD (peripheral vascular disease) ([facility] [address] )     3/9/2023 10:20 PM Cloria Hem [I73 9] PVD (peripheral vascular disease) ([facility] [address] ) rt renal anerysm sacular 1 5cm; mostly thrombosed 9mm splenic artery anerysm    3/9/2023 10:20 PM [place] Caldron Add [R93 89] Abnormal pelvic ultrasound     3/9/2023 10:21 PM [place] Caldron Modify [R93 89] Abnormal pelvic ultrasound sepated rt ovarian cyst;     3/9/2023 10:21 PM [place] Caldron Modify [R93 89] Abnormal pelvic ultrasound sepated rt ovarian cyst; Stable focal thickening and cystic change along the posterior endometrium with surrounding fluid  This could represent a sessile polyp though definitive characterization with sampling is recommended as neoplasm cannot be excluded, though stability , since July 2022       ED Disposition     ED Disposition   Discharge    Condition   Stable    Date/Time   Thu Mar 9, 2023 10:16 PM    Comment   Carter Muhammad discharge to home/self care                 Follow-up Information     Follow up With Specialties Details Why Contact Info Additional [address] [facility] [address] [place] [facility] Call in 1 day recheck of UTI symptoms; kidney stones [address] [place] [facility], [address] [place], [place], [address] [place] [address]    [facility] [address] V[facility] Call in 1 day follow up of renal aneurysm, splenic artery aneurysm [address] [facility]  [address] [phone] [facility] [address] [address], [address] [facility], [address] [place], [place], [address]    Deandre Guevara MD Obstetrics and Gynecology, Obstetrics, Gynecology Call in 1 day futher follow up of ovarian cyst and abnormal endometrium [address] [facility] [address] [facility], [address] Call in 1 day recheck in 1 week to recheck blood pressure [address] [place] [address]   [address] [facility] [address]  [address]  [phone]             Discharge Medication List as of 3/9/2023 10:33 PM      START taking these medications    Details   cephalexin (KEFLEX) 500 mg capsule Take 1 capsule (500 mg total) by mouth every 6 (six) hours for 7 days, Starting Thu 3/9/2023, Until Thu 3/16/2023, Normal      ondansetron (ZOFRAN-ODT) 4 mg disintegrating tablet Take 1 tablet (4 mg total) by mouth every 8 (eight) hours as needed for nausea or vomiting for up to 20 doses, Starting Thu 3/9/2023, Normal         CONTINUE these medications which have NOT CHANGED    Details   Ascorbic Acid (VITAMIN C PO) Take by mouth, Historical Med      lisinopril (ZESTRIL) 10 mg tablet TAKE 1 TABLET BY MOUTH EVERY DAY, Normal      Multiple Vitamins-Minerals (ZINC PO) Take by mouth, Historical Med      VITAMIN D PO Take by mouth, Historical Med                 PDMP Review       Value Time User    PDMP Reviewed  Yes 5/5/2022 10:15 AM Sylvie Villegas DO          ED Provider  Electronically Signed by           Sania Elaine MD  03/10/23 3004

## 2023-03-10 LAB
ATRIAL RATE: 103 BPM
P AXIS: 71 DEGREES
PR INTERVAL: 170 MS
QRS AXIS: 7 DEGREES
QRSD INTERVAL: 92 MS
QT INTERVAL: 376 MS
QTC INTERVAL: 492 MS
T WAVE AXIS: 59 DEGREES
VENTRICULAR RATE: 103 BPM

## 2023-03-10 NOTE — DISCHARGE INSTRUCTIONS
Plenty of fluids  Zofran as needed for nausea  Finish 7 days of cephalexin  Return with inability keep fluids down throwing up worsening or change in pain or any new or worsening symptoms  Tylenol 650mg every 6 hours as needed for pain, fever (max 3000mg in 24 hours)   Aleve 2 tabs twice daily with food OR ibuprofen 200-800mg every 8 hours with food as needed for pain

## 2023-03-10 NOTE — TELEPHONE ENCOUNTER
79-year-old female presenting to the ER due to abdominal pain discovered to have positive UTI, kidney stones on CT noted to be moving into the renal pelvis although no obstruction or hydronephrosis, additional findings related due to cystitis and infection  Requesting outpatient follow-up in approximately 2 weeks for reevaluation of patient's symptoms  Was originally scheduled for follow-up on 12/2022 appears as though appointment was canceled

## 2023-03-10 NOTE — TELEPHONE ENCOUNTER
Called and spoke with patient  She was scheduled for an appointment with Duncan Chaudhary in the [place] office on 3/22/23 @ [phone]

## 2023-03-10 NOTE — ED NOTES
Pt requested telemtry leads to be discontinued due to discomfort  Educated pt on risks of not being able to see any abnormal rhythms  Pt still requested d/c of leads   D/c leads at this time     Valentina Carmona RN  03/09/23 2026

## 2023-03-12 LAB — BACTERIA UR CULT: ABNORMAL

## 2023-03-17 ENCOUNTER — RA CDI HCC (OUTPATIENT)
Dept: OTHER | Facility: HOSPITAL | Age: 68
End: 2023-03-17

## 2023-03-17 NOTE — PROGRESS NOTES
[facility]  coding opportunities       Chart reviewed, no opportunity found:   [address]        Patients Insurance     Medicare Insurance: [employer] Advantage

## 2023-03-22 ENCOUNTER — OFFICE VISIT (OUTPATIENT)
Dept: UROLOGY | Facility: CLINIC | Age: 68
End: 2023-03-22

## 2023-03-22 VITALS
HEIGHT: 65 IN | BODY MASS INDEX: 22.82 KG/M2 | HEART RATE: 68 BPM | WEIGHT: 137 LBS | SYSTOLIC BLOOD PRESSURE: 138 MMHG | DIASTOLIC BLOOD PRESSURE: 102 MMHG

## 2023-03-22 DIAGNOSIS — N39.0 URINARY TRACT INFECTION WITHOUT HEMATURIA, SITE UNSPECIFIED: Primary | ICD-10-CM

## 2023-03-22 LAB
SL AMB  POCT GLUCOSE, UA: NORMAL
SL AMB LEUKOCYTE ESTERASE,UA: NORMAL
SL AMB POCT BILIRUBIN,UA: NORMAL
SL AMB POCT BLOOD,UA: NORMAL
SL AMB POCT CLARITY,UA: CLEAR
SL AMB POCT COLOR,UA: YELLOW
SL AMB POCT KETONES,UA: NORMAL
SL AMB POCT NITRITE,UA: NORMAL
SL AMB POCT PH,UA: 6.5
SL AMB POCT SPECIFIC GRAVITY,UA: 1.02
SL AMB POCT URINE PROTEIN: NORMAL
SL AMB POCT UROBILINOGEN: 0.2

## 2023-03-22 NOTE — PROGRESS NOTES
3/22/2023    Chief Complaint   Patient presents with   • Follow-up     Er visit  Assessment and Plan    78 y o  female     1  UTI  · ER visit on 3/9/2023 for evaluation of elevated blood pressure, dizziness, lightheadedness, and intermittent sweats  Treated for acute cystitis culture positive for pansensitive E  coli and received 1 g IV Rocephin and discharged with p o  Keflex for 7 days  · CT imaging did show two of the previously seen right-sided intrarenal calculi measuring up to 7 mm have migrated into the renal pelvis  Moderate urothelial thickening in the right collecting system, likely infectious  No ureteral calculi or hydronephrosis bilaterally  · Urine dip today does show large blood and leukocytes negative nitrates  · Will send for urine culture and await antibiotics as she is currently asymptomatic  · Follow-up in 3 months to reassess  Subjective:    She presents today reporting doing well  She finished her Keflex on Saturday  She does report some cloudy urine however denies any lower urinary tract symptoms such as frequency, dysuria, urgency, or hematuria  She denies any abdominal pain or flank pain as well  History of Present Illness  Donald Pruett is a 78 y o  female here for ER follow-up evaluation of urinary tract infection  She was evaluated at [address] [place] [address] emergency department on 3/9/2023 after her PCP recommended she be evaluated due to elevated blood pressures, dizziness, lightheadedness, intermittent sweats  She also complained of right inguinal pain with urinary frequency, her urinalysis was nitrate positive  She did have CT imaging of the abdomen pelvis which showed two of the previously seen right-sided intrarenal calculi measuring up to 7 mm have migrated into the renal pelvis  Moderate urothelial thickening in the right collecting system, likely infectious  She would receive 1 dose of IV Rocephin 1 g and was discharged with p o  Keflex    Final culture and sensitivity was positive for pansensitive E  Coli  Review of Systems   Constitutional: Negative for chills and fever  HENT: Negative for ear pain and sore throat  Eyes: Negative for pain and visual disturbance  Respiratory: Negative for cough and shortness of breath  Cardiovascular: Negative for chest pain and palpitations  Gastrointestinal: Negative for abdominal pain, constipation, diarrhea, nausea and vomiting  Genitourinary: Negative for difficulty urinating, dysuria, flank pain, frequency, hematuria, pelvic pain and urgency  Musculoskeletal: Negative for arthralgias and back pain  Skin: Negative for color change and rash  Neurological: Negative for dizziness, syncope, weakness and numbness  All other systems reviewed and are negative  Vitals  Vitals:    03/22/23 0946   BP: (!) 138/102   Pulse: 68   Weight: 62 1 kg (137 lb)   Height: 5' 5" (1 651 m)       Physical Exam  Vitals reviewed  Constitutional:       General: She is not in acute distress  Appearance: Normal appearance  She is normal weight  She is not ill-appearing or toxic-appearing  HENT:      Head: Normocephalic and atraumatic  Nose: Nose normal    Eyes:      General: No scleral icterus  Conjunctiva/sclera: Conjunctivae normal    Cardiovascular:      Rate and Rhythm: Normal rate and regular rhythm  Pulses: Normal pulses  Heart sounds: Normal heart sounds  Pulmonary:      Effort: Pulmonary effort is normal  No respiratory distress  Breath sounds: Normal breath sounds  Abdominal:      General: Abdomen is flat  Palpations: Abdomen is soft  Tenderness: There is no abdominal tenderness  There is no right CVA tenderness or left CVA tenderness  Hernia: No hernia is present  Musculoskeletal:         General: Normal range of motion  Cervical back: Normal range of motion  Skin:     General: Skin is warm and dry     Neurological:      General: No focal deficit present  Mental Status: She is alert and oriented to person, place, and time  Mental status is at baseline  Psychiatric:         Mood and Affect: Mood normal          Behavior: Behavior normal          Thought Content: Thought content normal          Judgment: Judgment normal          Past History  Past Medical History:   Diagnosis Date   • Allergic     seasonal     Social History     Socioeconomic History   • Marital status: /Civil Union     Spouse name: None   • Number of children: None   • Years of education: None   • Highest education level: None   Occupational History   • None   Tobacco Use   • Smoking status: Every Day     Packs/day: 0 25     Years: 50 00     Pack years: 12 50     Types: Cigarettes     Start date: 0   • Smokeless tobacco: Never   Vaping Use   • Vaping Use: Never used   Substance and Sexual Activity   • Alcohol use: Not Currently     Comment: rare   • Drug use: Never   • Sexual activity: Yes   Other Topics Concern   • None   Social History Narrative   • None     Social Determinants of Health     Financial Resource Strain: Low Risk    • Difficulty of Paying Living Expenses: Not hard at all   Food Insecurity: Not on file   Transportation Needs: No Transportation Needs   • Lack of Transportation (Medical): No   • Lack of Transportation (Non-Medical):  No   Physical Activity: Not on file   Stress: Not on file   Social Connections: Not on file   Intimate Partner Violence: Not on file   Housing Stability: Not on file     Social History     Tobacco Use   Smoking Status Every Day   • Packs/day: 0 25   • Years: 50 00   • Pack years: 12 50   • Types: Cigarettes   • Start date: 0   Smokeless Tobacco Never     Family History   Problem Relation Age of Onset   • No Known Problems Mother    • Hypertension Father        The following portions of the patient's history were reviewed and updated as appropriate allergies, current medications, past medical history, past social history, past surgical history and problem list    Imaging:     3/09/2023  CT ABDOMEN AND PELVIS WITH AND WITHOUT IV CONTRAST     INDICATION:   RLQ pain h/o kidney stones  h/o pelvic mass eval for hydroneprosis h/o rt renal aneurysm, splenic a aneurysm      COMPARISON:  Pelvic ultrasound from earlier today and CT of the abdomen and pelvis from 7/8/2022 and 5/17/2022      TECHNIQUE: Initial CT of the abdomen and pelvis was performed without intravenous contrast   Subsequent dynamic CT evaluation of the abdomen and pelvis was performed after the administration of intravenous contrast in nephrographic phase  Additional   delayed phase images were obtained through the abdomen Axial, sagittal, and coronal 2D reformatted images were created from the source data and submitted for interpretation       Radiation dose length product (DLP) for this visit:  1457 43 mGy-cm   This examination, like all CT scans performed in the [facility], was performed utilizing techniques to minimize radiation dose exposure, including the use of   iterative reconstruction and automated exposure control      IV Contrast:  100 mL of iohexol (OMNIPAQUE)  Enteric Contrast:  Enteric contrast was not administered      FINDINGS:     ABDOMEN     LOWER CHEST: Linear opacities at the lung bases likely represent scarring or linear atelectasis  No pleural effusions  Small hiatal hernia      LIVER/BILIARY TREE:  Normal liver morphology  The liver is enlarged, measuring 20 2 cm in greatest recorder dimension  There is a hemangioma in the right hepatic lobe posteriorly  A 2nd smaller hemangioma is seen in the right hepatic lobe more   inferiorly  There is a subcentimeter hypodense lesion in the right hepatic lobe more inferiorly, too small to accurately characterize with CT technique, however statistically most likely a cyst      No biliary ductal dilation      GALLBLADDER:  No calcified gallstones   No pericholecystic inflammatory change      SPLEEN: Unremarkable      PANCREAS:  Unremarkable  Normal caliber main pancreatic duct      ADRENAL GLANDS:  Unremarkable      KIDNEYS/URETERS:  Symmetric renal enhancement  There are 2 adjacent calculi in the right renal pelvis measuring up to 7 mm  These calculi were previously located in a calyx  There are several smaller calculi in a right lower pole calyx  There is   moderate urothelial thickening in the right renal pelvis and the calyces, however no hydronephrosis  The right ureter is normal in caliber  On the delayed phase IV contrast is seen within the right ureter distal to the calculi  No calculi are seen in   the right ureter      There is no left-sided hydronephrosis  No left-sided ureteral or intrarenal calculi      There are bilateral peripelvic cysts  Several simple cysts are seen in the right kidney      There are multiple subcentimeter hypodense lesions in both kidneys, too small to accurately characterize with CT technique, however statistically most likely cysts      STOMACH AND BOWEL:  Evaluation of the gastrointestinal tract is somewhat limited by underdistention and lack of oral contrast  No bowel obstruction or convincing inflammation  The cecum is mobile, and displaced medially      APPENDIX:  The appendix is not visualized, however there are no secondary findings of appendicitis      ABDOMINOPELVIC CAVITY:  No ascites or pneumoperitoneum      LYMPH NODES: No abdominal or pelvic lymphadenopathy      VESSELS: Normal caliber aorta  Patent major branch vessels   Scattered atherosclerotic calcifications in the abdominal aorta and its major branches      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, 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      PELVIS     REPRODUCTIVE ORGANS:  Several calcifications are seen in the uterus, likely related to underlying fibroids      9 3 x 6 8 cm cystic lesion in the right adnexa, slightly enlarged from May 2022  It is better assessed on the pelvic ultrasound from earlier today      URINARY BLADDER:  A droplet of air is seen in the nondependent portion of the urinary bladder  The urinary bladder is otherwise unremarkable      ABDOMINAL WALL/INGUINAL REGIONS: Small fat-containing umbilical hernia  There is no stranding of the fat in or around the hernia sac       OSSEOUS STRUCTURES:  No focal aggressive osseous lesions  A subcentimeter densely sclerotic lesion is seen in the T12 vertebral body as on the prior studies, statistically likely a bone island      Degenerative changes of the spine  Stable grade 1-2 anterolisthesis of L5 on S1 with fusion of L5 and S1  Degenerative changes of the right hip joint  Generalized osteopenia      IMPRESSION:     Two of the previously seen right-sided intrarenal calculi measuring up to 7 mm have migrated into the renal pelvis  Moderate urothelial thickening in the right collecting system, likely infectious        No evidence of pyelonephritis or perinephric abscess      No ureteral calculi or hydronephrosis      Droplet of air in the urinary bladder, which in the absence of recent instrumentation suggests cystitis      No other acute abdominal pelvic pathology      Stable right renal artery aneurysm      9 3 x 6 8 cm cystic lesion in the right adnexa better assessed on the earlier pelvic ultrasound              Results  Recent Results (from the past 1 hour(s))   POCT urine dip    Collection Time: 03/22/23 10:00 AM   Result Value Ref Range    LEUKOCYTE ESTERASE,UA ++     NITRITE,UA -     SL AMB POCT UROBILINOGEN 0 2     POCT URINE PROTEIN -      PH,UA 6 5     BLOOD,UA large     SPECIFIC GRAVITY,UA 1 020     KETONES,UA -     BILIRUBIN,UA -     GLUCOSE, UA -      COLOR,UA yellow     CLARITY,UA clear    ]  No results found for: PSA  Lab Results   Component Value Date    CALCIUM 9 3 03/09/2023    K 3 7 03/09/2023    CO2 25 03/09/2023     (H) 03/09/2023    BUN 25 03/09/2023    CREATININE 0 89 03/09/2023     Lab Results   Component Value Date    WBC 6 58 03/09/2023    HGB 11 8 03/09/2023    HCT 35 8 03/09/2023    MCV 92 03/09/2023     03/09/2023       Please Note:  Voice dictation software has been used to create this document  There may be inadvertent transcriptions errors       BRANDON Carlos 03/22/23

## 2023-03-23 ENCOUNTER — OFFICE VISIT (OUTPATIENT)
Dept: FAMILY MEDICINE CLINIC | Facility: CLINIC | Age: 68
End: 2023-03-23

## 2023-03-23 VITALS
DIASTOLIC BLOOD PRESSURE: 81 MMHG | TEMPERATURE: 96.9 F | HEIGHT: 65 IN | WEIGHT: 136 LBS | SYSTOLIC BLOOD PRESSURE: 157 MMHG | BODY MASS INDEX: 22.66 KG/M2 | OXYGEN SATURATION: 99 % | HEART RATE: 90 BPM

## 2023-03-23 DIAGNOSIS — N83.8 OVARIAN MASS, RIGHT: Primary | ICD-10-CM

## 2023-03-23 DIAGNOSIS — I10 ESSENTIAL HYPERTENSION, BENIGN: ICD-10-CM

## 2023-03-23 DIAGNOSIS — N39.0 FREQUENT UTI: ICD-10-CM

## 2023-03-23 RX ORDER — LISINOPRIL 10 MG/1
20 TABLET ORAL DAILY
Qty: 90 TABLET | Refills: 2
Start: 2023-03-23

## 2023-03-23 NOTE — PROGRESS NOTES
Name: Ran Locke      : 1955      MRN: [id number]  Encounter Provider: Harshad Avalos DO  Encounter Date: 3/23/2023   Encounter department: [facility]    Assessment & Plan     1  Ovarian mass, right  Assessment & Plan:  Patient has an ovarian mass  I noted on her CT from the ER a 9 3 x 6 8 cm cystic lesion of the right adnexa was noted  I advised the patient of this and recommended follow-up with gynecology  The patient has seen gynecology in the past for this lesion which was previously detected  However, she has not followed up  I offered to refer her to gynecologic oncology but she declined  She tells me that the ER said they would send her a list of physicians that she could pick from  She wants to wait until she gets this list from the ER  I told the patient she must follow through with this  2  Essential hypertension, benign  Assessment & Plan:  Patient has hypertension which is poorly controlled  I checked her blood pressure myself and found her blood pressure to be 156/88  I recommended we increase her lisinopril to 20 mg daily  The patient was fearful that it would make her blood pressure too low and wanted to increase it to a tablet and a half, 15 mg daily  I told her that that is not going to cut it  I actually do not think that the 20 mg of lisinopril is going to be enough  She needs close follow-up  I scheduled a follow-up visit to reassess her blood pressure  I likely will need to consider adding a thiazide diuretic to her regiment  Orders:  -     lisinopril (ZESTRIL) 10 mg tablet; Take 2 tablets (20 mg total) by mouth daily    3  Frequent UTI  Assessment & Plan:  Reviewed the patient's urine culture from the ER  It was positive for E  coli and should have been sensitive to the cephalexin  Of note, this really is considered asymptomatic bacteriuria    The patient will continue follow-up with urology           Subjective      This is a 79-year-old white female who presents to the office today for her follow-up visit  The patient had gone to the emergency department when her blood pressure was found to be 200/97  The ER, her blood pressure was 182/106  She was found to have a urinary tract infection although she was asymptomatic  She was treated with Keflex and advised to follow-up with urology  She was not given any specific treatment or recommendations for her blood pressure  Of note, patient saw urology yesterday and her blood pressure was 138/102  Review of Systems   Cardiovascular: Negative for chest pain, palpitations and leg swelling  Gastrointestinal: Negative for abdominal distention, abdominal pain, blood in stool, constipation, diarrhea and nausea  Genitourinary: Negative for dysuria, frequency, hematuria and urgency  Neurological: Negative for dizziness, light-headedness and headaches  Current Outpatient Medications on File Prior to Visit   Medication Sig   • Ascorbic Acid (VITAMIN C PO) Take by mouth   • Multiple Vitamins-Minerals (ZINC PO) Take by mouth   • VITAMIN D PO Take by mouth   • [DISCONTINUED] lisinopril (ZESTRIL) 10 mg tablet TAKE 1 TABLET BY MOUTH EVERY DAY   • ondansetron (ZOFRAN-ODT) 4 mg disintegrating tablet Take 1 tablet (4 mg total) by mouth every 8 (eight) hours as needed for nausea or vomiting for up to 20 doses (Patient not taking: Reported on 3/22/2023)       Objective     /81   Pulse 90   Temp (!) 96 9 °F (36 1 °C) (Tympanic)   Ht 5' 5" (1 651 m)   Wt 61 7 kg (136 lb)   LMP  (LMP Unknown)   SpO2 99%   BMI 22 63 kg/m²     Physical Exam  Vitals reviewed  Constitutional:       Comments: This is a 79-year-old white female who appears her stated age  The patient is pleasant, cooperative, and in no distress   HENT:      Head: Normocephalic and atraumatic        Right Ear: Tympanic membrane, ear canal and external ear normal       Left Ear: Tympanic membrane, ear canal and external ear normal       Mouth/Throat:      Mouth: Mucous membranes are moist       Pharynx: No oropharyngeal exudate or posterior oropharyngeal erythema  Eyes:      General: No scleral icterus  Right eye: No discharge  Left eye: No discharge  Conjunctiva/sclera: Conjunctivae normal       Pupils: Pupils are equal, round, and reactive to light  Cardiovascular:      Rate and Rhythm: Normal rate and regular rhythm  Heart sounds: Normal heart sounds  No murmur heard  No friction rub  No gallop  Pulmonary:      Effort: Pulmonary effort is normal  No respiratory distress  Breath sounds: Normal breath sounds  No stridor  No wheezing, rhonchi or rales  Abdominal:      General: Bowel sounds are normal  There is no distension  Palpations: Abdomen is soft  There is no mass  Tenderness: There is no abdominal tenderness  There is no right CVA tenderness, left CVA tenderness or guarding  Musculoskeletal:      Cervical back: Neck supple  Lymphadenopathy:      Cervical: No cervical adenopathy       Extremities: Without cyanosis, clubbing, or edema  Cary Deter, DO

## 2023-03-24 ENCOUNTER — TELEPHONE (OUTPATIENT)
Dept: UROLOGY | Facility: CLINIC | Age: 68
End: 2023-03-24

## 2023-03-24 DIAGNOSIS — N39.0 URINARY TRACT INFECTION WITHOUT HEMATURIA, SITE UNSPECIFIED: Primary | ICD-10-CM

## 2023-03-24 LAB — BACTERIA UR CULT: ABNORMAL

## 2023-03-24 RX ORDER — CIPROFLOXACIN 500 MG/1
500 TABLET, FILM COATED ORAL EVERY 12 HOURS SCHEDULED
Qty: 14 TABLET | Refills: 0 | Status: SHIPPED | OUTPATIENT
Start: 2023-03-24 | End: 2023-03-31

## 2023-03-24 NOTE — TELEPHONE ENCOUNTER
----- Message from Alisha U  80  sent at 3/24/2023  2:06 PM EDT -----  Please follow-up with patient if she is currently experiencing any urinary tract symptoms  Her urine culture is positive however I do not have the final culture and sensitivity  If she is asymptomatic I would not recommend antibiotics at this time  Due to the upcoming weekend however if she is experiencing any symptoms I will send a prescription to her pharmacy

## 2023-03-24 NOTE — RESULT ENCOUNTER NOTE
Please follow-up with patient if she is currently experiencing any urinary tract symptoms  Her urine culture is positive however I do not have the final culture and sensitivity  If she is asymptomatic I would not recommend antibiotics at this time  Due to the upcoming weekend however if she is experiencing any symptoms I will send a prescription to her pharmacy

## 2023-03-24 NOTE — ASSESSMENT & PLAN NOTE
Reviewed the patient's urine culture from the ER  It was positive for E  coli and should have been sensitive to the cephalexin  Of note, this really is considered asymptomatic bacteriuria    The patient will continue follow-up with urology

## 2023-03-24 NOTE — TELEPHONE ENCOUNTER
Due to her reports of continued pain and positive urine culture  I have sent a prescription for ciprofloxacin to her pharmacy  We will contact her if this needs to be adjusted based on her final culture and sensitivity

## 2023-03-24 NOTE — TELEPHONE ENCOUNTER
rec'd telephone call from pt stating that she is still having the twinge she feels when she is passing a kidney stone  She does deny having urinary symptoms other than cloudy urine  Pt can't say if she's voiding more than normal because she drinks so much water       Pt's pharmacy is [facility]

## 2023-03-24 NOTE — ASSESSMENT & PLAN NOTE
Patient has an ovarian mass  I noted on her CT from the ER a 9 3 x 6 8 cm cystic lesion of the right adnexa was noted  I advised the patient of this and recommended follow-up with gynecology  The patient has seen gynecology in the past for this lesion which was previously detected  However, she has not followed up  I offered to refer her to gynecologic oncology but she declined  She tells me that the ER said they would send her a list of physicians that she could pick from  She wants to wait until she gets this list from the ER  I told the patient she must follow through with this

## 2023-03-24 NOTE — ASSESSMENT & PLAN NOTE
Patient has hypertension which is poorly controlled  I checked her blood pressure myself and found her blood pressure to be 156/88  I recommended we increase her lisinopril to 20 mg daily  The patient was fearful that it would make her blood pressure too low and wanted to increase it to a tablet and a half, 15 mg daily  I told her that that is not going to cut it  I actually do not think that the 20 mg of lisinopril is going to be enough  She needs close follow-up  I scheduled a follow-up visit to reassess her blood pressure  I likely will need to consider adding a thiazide diuretic to her regiment

## 2023-04-07 ENCOUNTER — OFFICE VISIT (OUTPATIENT)
Dept: FAMILY MEDICINE CLINIC | Facility: CLINIC | Age: 68
End: 2023-04-07

## 2023-04-07 VITALS
HEART RATE: 83 BPM | OXYGEN SATURATION: 99 % | DIASTOLIC BLOOD PRESSURE: 82 MMHG | HEIGHT: 65 IN | TEMPERATURE: 97 F | WEIGHT: 137 LBS | SYSTOLIC BLOOD PRESSURE: 133 MMHG | BODY MASS INDEX: 22.82 KG/M2

## 2023-04-07 DIAGNOSIS — N83.8 OVARIAN MASS, RIGHT: ICD-10-CM

## 2023-04-07 DIAGNOSIS — Z53.20 MAMMOGRAM DECLINED: ICD-10-CM

## 2023-04-07 DIAGNOSIS — I10 ESSENTIAL HYPERTENSION, BENIGN: Primary | ICD-10-CM

## 2023-04-07 DIAGNOSIS — J30.1 SEASONAL ALLERGIC RHINITIS DUE TO POLLEN: ICD-10-CM

## 2023-04-07 RX ORDER — LISINOPRIL 20 MG/1
20 TABLET ORAL DAILY
Qty: 90 TABLET | Refills: 3 | Status: SHIPPED | OUTPATIENT
Start: 2023-04-07

## 2023-04-07 NOTE — PROGRESS NOTES
Name: Marian Joseph      : 1955      MRN: [id number]  Encounter Provider: Madisyn Ferrell DO  Encounter Date: 2023   [facility]    Assessment & Plan     1  Essential hypertension, benign  Assessment & Plan:  Patient has hypertension  Her blood pressure is now well controlled  I sent in a new prescription for lisinopril 20 mg  She will take 1 daily  I advised the patient to continue to follow a low-sodium diet  A follow-up visit was scheduled for approximately 3 months  Orders:  -     lisinopril (ZESTRIL) 20 mg tablet; Take 1 tablet (20 mg total) by mouth daily    2  Seasonal allergic rhinitis due to pollen  Assessment & Plan:  Patient has seasonal allergic rhinitis  I recommended loratadine 10 mg   I directed her to the [employer] to purchase these  She can take it daily if necessary  I also told her this medication is effective taking it as needed  3  Mammogram declined  Assessment & Plan:  I offered to refer this patient for a mammogram but she declined  4  Ovarian mass, right  Assessment & Plan:  We again discussed her right ovarian mass  She did contact gynecology but declined an appointment  I stressed to her the importance of following up for this mass  She understands and tells me that gynecology is going to call her back again in May and she will set up an appointment in May  Tobacco Cessation Counseling: Tobacco cessation counseling was provided  The patient is sincerely urged to quit consumption of tobacco  She is not ready to quit tobacco          Subjective      Patient is a 79-year-old white female who presents to the office today for follow-up visit regarding her hypertension  Patient reports no side effects from the increased dose of lisinopril  She was fearful that it would make her blood pressure too low but she tells me she has not had any lightheadedness or dizziness    Her other complaint today is "allergies  She has history of springtime allergies  She was outside working all day  Today, she has runny nose, nasal congestion, watery eyes, and even a slight cough  Review of Systems   HENT: Positive for congestion, rhinorrhea and sneezing  Eyes: Positive for discharge  Respiratory: Positive for cough  Negative for shortness of breath  Allergic/Immunologic: Positive for environmental allergies  Neurological: Negative for dizziness and light-headedness  Current Outpatient Medications on File Prior to Visit   Medication Sig   • Ascorbic Acid (VITAMIN C PO) Take by mouth   • Multiple Vitamins-Minerals (ZINC PO) Take by mouth   • VITAMIN D PO Take by mouth   • [DISCONTINUED] lisinopril (ZESTRIL) 10 mg tablet Take 2 tablets (20 mg total) by mouth daily   • ondansetron (ZOFRAN-ODT) 4 mg disintegrating tablet Take 1 tablet (4 mg total) by mouth every 8 (eight) hours as needed for nausea or vomiting for up to 20 doses (Patient not taking: Reported on 3/22/2023)       Objective     /82   Pulse 83   Temp (!) 97 °F (36 1 °C) (Tympanic)   Ht 5' 5\" (1 651 m)   Wt 62 1 kg (137 lb)   LMP  (LMP Unknown)   SpO2 99%   BMI 22 80 kg/m²     Physical Exam  Vitals reviewed  Constitutional:       Comments: Patient is a 60-year-old white female who appears her stated age  The patient is pleasant, cooperative, and in no distress   HENT:      Head: Normocephalic and atraumatic  Right Ear: Tympanic membrane, ear canal and external ear normal  There is no impacted cerumen  Left Ear: Tympanic membrane, ear canal and external ear normal  There is no impacted cerumen  Nose: Congestion and rhinorrhea present  Comments: Positive nasal congestion with pale nasal turbinates and clear rhinorrhea     Mouth/Throat:      Mouth: Mucous membranes are moist       Pharynx: Oropharynx is clear  No oropharyngeal exudate or posterior oropharyngeal erythema  Eyes:      General: No scleral icterus     " Right eye: No discharge  Left eye: No discharge  Conjunctiva/sclera: Conjunctivae normal       Pupils: Pupils are equal, round, and reactive to light  Comments: Puffiness around the eyes noted   Cardiovascular:      Rate and Rhythm: Normal rate and regular rhythm  Heart sounds: Normal heart sounds  No murmur heard  No friction rub  No gallop  Pulmonary:      Effort: Pulmonary effort is normal  No respiratory distress  Breath sounds: Normal breath sounds  No stridor  No wheezing, rhonchi or rales  Musculoskeletal:      Cervical back: Neck supple  Lymphadenopathy:      Cervical: No cervical adenopathy         Dionicio Flatten, DO

## 2023-04-07 NOTE — ASSESSMENT & PLAN NOTE
Patient has seasonal allergic rhinitis  I recommended loratadine 10 mg   I directed her to the [employer] to purchase these  She can take it daily if necessary  I also told her this medication is effective taking it as needed

## 2023-04-07 NOTE — ASSESSMENT & PLAN NOTE
We again discussed her right ovarian mass  She did contact gynecology but declined an appointment  I stressed to her the importance of following up for this mass  She understands and tells me that gynecology is going to call her back again in May and she will set up an appointment in May

## 2023-04-07 NOTE — ASSESSMENT & PLAN NOTE
Patient has hypertension  Her blood pressure is now well controlled  I sent in a new prescription for lisinopril 20 mg  She will take 1 daily  I advised the patient to continue to follow a low-sodium diet  A follow-up visit was scheduled for approximately 3 months

## 2023-04-24 ENCOUNTER — CONSULT (OUTPATIENT)
Dept: VASCULAR SURGERY | Facility: HOSPITAL | Age: 68
End: 2023-04-24
Attending: EMERGENCY MEDICINE

## 2023-04-24 VITALS
SYSTOLIC BLOOD PRESSURE: 120 MMHG | DIASTOLIC BLOOD PRESSURE: 84 MMHG | WEIGHT: 135.8 LBS | OXYGEN SATURATION: 99 % | TEMPERATURE: 97 F | HEIGHT: 65 IN | BODY MASS INDEX: 22.63 KG/M2 | HEART RATE: 111 BPM

## 2023-04-24 DIAGNOSIS — I73.9 PVD (PERIPHERAL VASCULAR DISEASE) (HCC): ICD-10-CM

## 2023-04-24 DIAGNOSIS — I72.2 RENAL ARTERY ANEURYSM (HCC): Primary | ICD-10-CM

## 2023-04-24 DIAGNOSIS — I72.8 SPLENIC ARTERY ANEURYSM (HCC): ICD-10-CM

## 2023-04-24 NOTE — PROGRESS NOTES
"Assessment/Plan:    Renal artery aneurysm (HCC)  Asymptomatic Right renal artery calcified aneurysm measuring approximately 1 6cm  We discussed indication for treatment  At this time she does not meet criteria and recommend continued medical management with surveillance duplex in 6 months  Recommend smoking cessation  Splenic artery aneurysm (HCC)  10 mm calcified splenic artery aneurysm- asymptomatic  We discussed indications for intervention- at this time she does not meet criteria  Recommend continued medical management/surveillance  Diagnoses and all orders for this visit:    Renal artery aneurysm (HCC)  -     VAS renal artery complete; Future    PVD (peripheral vascular disease) (HCC)  Comments:  rt renal anerysm sacular 1 5cm; mostly thrombosed 9mm splenic artery anerysm  Orders:  -     Ambulatory Referral to Vascular Surgery    Splenic artery aneurysm [facility])          Subjective:      Patient ID: Karyle Drain is a 78 y o  female  New patient referred by ED DR Pierce for PVD and renal artery aneurysm  Pt reports no symptoms  Had a CTA done 3/9/23  Pt is a current smoker 4 a day  Ms Inna Gallegos is a pleasant 73-year-old who was referred to the office for finding a right renal artery as well as chronic artery aneurysm  She is asymptomatic from an aneurysm standpoint  The following portions of the patient's history were reviewed and updated as appropriate: allergies, current medications, past family history, past medical history, past social history, past surgical history and problem list     Review of Systems   Musculoskeletal: Positive for joint swelling  Objective:      /84   Pulse (!) 111   Temp (!) 97 °F (36 1 °C) (Temporal)   Ht 5' 5\" (1 651 m)   Wt 61 6 kg (135 lb 12 8 oz)   LMP  (LMP Unknown)   SpO2 99%   BMI 22 60 kg/m²          Physical Exam  Constitutional:       General: She is not in acute distress  Appearance: Normal appearance   She is " not ill-appearing, toxic-appearing or diaphoretic  HENT:      Right Ear: External ear normal       Left Ear: External ear normal    Eyes:      General: No scleral icterus  Right eye: No discharge  Left eye: No discharge  Neck:      Comments: ROM WFL  Pulmonary:      Effort: Pulmonary effort is normal    Abdominal:      General: Abdomen is flat  Bowel sounds are normal  There is no distension  Tenderness: There is no abdominal tenderness  There is no guarding  Musculoskeletal:      Comments: ROM WFL   Skin:     Coloration: Skin is not jaundiced  Neurological:      General: No focal deficit present  Mental Status: She is alert  Psychiatric:         Mood and Affect: Mood normal          Behavior: Behavior normal          Thought Content: Thought content normal          Judgment: Judgment normal            CT imaging: 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, 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

## 2023-04-24 NOTE — ASSESSMENT & PLAN NOTE
10 mm calcified splenic artery aneurysm- asymptomatic  We discussed indications for intervention- at this time she does not meet criteria  Recommend continued medical management/surveillance

## 2023-04-24 NOTE — ASSESSMENT & PLAN NOTE
Asymptomatic Right renal artery calcified aneurysm measuring approximately 1 6cm  We discussed indication for treatment  At this time she does not meet criteria and recommend continued medical management with surveillance duplex in 6 months  Recommend smoking cessation

## 2023-06-22 ENCOUNTER — OFFICE VISIT (OUTPATIENT)
Dept: UROLOGY | Facility: CLINIC | Age: 68
End: 2023-06-22
Payer: COMMERCIAL

## 2023-06-22 VITALS
WEIGHT: 135 LBS | BODY MASS INDEX: 25.49 KG/M2 | HEIGHT: 61 IN | HEART RATE: 64 BPM | SYSTOLIC BLOOD PRESSURE: 124 MMHG | DIASTOLIC BLOOD PRESSURE: 80 MMHG

## 2023-06-22 DIAGNOSIS — N39.0 RECURRENT UTI: Primary | ICD-10-CM

## 2023-06-22 PROCEDURE — 81002 URINALYSIS NONAUTO W/O SCOPE: CPT

## 2023-06-22 PROCEDURE — 87086 URINE CULTURE/COLONY COUNT: CPT

## 2023-06-22 PROCEDURE — 87077 CULTURE AEROBIC IDENTIFY: CPT

## 2023-06-22 PROCEDURE — 99213 OFFICE O/P EST LOW 20 MIN: CPT

## 2023-06-22 NOTE — PROGRESS NOTES
6/22/2023    No chief complaint on file  Assessment and Plan    78 y o  female     1  Recurrent UTI  · Last positive urine culture 3/22/2023 pansensitive E-coli and treated with Ciprofloxacin  · She does have non-obstructing stone in the right kidney which may be the source of her recurrent UTIs  · Urine dip today positive for leukocytes and blood negative nitrates  · She is currently asymptomatic today  · Will send for urine culture  · Recommend starting oral cranberry and d-mannose daily for UTI prevention  · Follow-up 6 months  Subjective:    She presents today reporting that she continues to do well and denies any lower urinary tract symptoms concerning for infection today  She will experience occasional frequency and urgency but denies any dysuria or abdominal/flank pain  Urine dip today I positive for leukocytes and blood negative nitrates  History of Present Illness  Annamarie Cross is a 78 y o  female here for follow-up evaluation of UTI  Established patient last seen by me on 3/22/2023 for emergency department follow-up evaluation after she had presented to [address] [place] [address] emergency department on 3/9/2023 after her PCP recommended she be evaluated due to elevated blood pressures, dizziness, lightheadedness, intermittent sweats  She also complained of right inguinal pain with urinary frequency, her urinalysis was nitrate positive  She did have CT imaging of the abdomen pelvis which showed two of the previously seen right-sided intrarenal calculi measuring up to 7 mm have migrated into the renal pelvis   Moderate urothelial thickening in the right collecting system, likely infectious  She would receive 1 dose of IV Rocephin 1 g and was discharged with p o  Keflex  Final culture and sensitivity was positive for pansensitive E  Coli      During her office visit with me she had just finished Keflex a few days prior and still reported that her urine was cloudy however denied any "lower urinary tract symptoms such as frequency, dysuria, urgency, or hematuria  She also denies any abdominal pain or flank pain  Her urine dip did show large blood and leukocytes however negative nitrates  This was sent for culture which was again pansensitive E  coli  She will be treated with ciprofloxacin            Review of Systems   Constitutional: Negative for chills and fever  HENT: Negative for ear pain and sore throat  Eyes: Negative for pain and visual disturbance  Respiratory: Negative for cough and shortness of breath  Cardiovascular: Negative for chest pain and palpitations  Gastrointestinal: Negative for abdominal pain and vomiting  Genitourinary: Negative for dysuria and hematuria  Musculoskeletal: Negative for arthralgias and back pain  Skin: Negative for color change and rash  Neurological: Negative for seizures and syncope  All other systems reviewed and are negative  Vitals  Vitals:    06/22/23 0753   BP: 124/80   BP Location: Left arm   Patient Position: Sitting   Cuff Size: Adult   Pulse: 64   Weight: 61 2 kg (135 lb)   Height: 5' 1\" (1 549 m)       Physical Exam  Vitals reviewed  Constitutional:       General: She is not in acute distress  Appearance: Normal appearance  She is normal weight  She is not ill-appearing or toxic-appearing  HENT:      Head: Normocephalic and atraumatic  Nose: Nose normal    Eyes:      General: No scleral icterus  Conjunctiva/sclera: Conjunctivae normal    Cardiovascular:      Rate and Rhythm: Normal rate  Pulses: Normal pulses  Pulmonary:      Effort: Pulmonary effort is normal  No respiratory distress  Abdominal:      General: Abdomen is flat  Palpations: Abdomen is soft  Tenderness: There is no abdominal tenderness  There is no right CVA tenderness or left CVA tenderness  Hernia: No hernia is present  Musculoskeletal:         General: Normal range of motion        Cervical back: " Normal range of motion  Skin:     General: Skin is warm and dry  Neurological:      General: No focal deficit present  Mental Status: She is alert and oriented to person, place, and time  Mental status is at baseline  Psychiatric:         Mood and Affect: Mood normal          Behavior: Behavior normal          Thought Content: Thought content normal          Judgment: Judgment normal          Past History  Past Medical History:   Diagnosis Date   • Allergic     seasonal     Social History     Socioeconomic History   • Marital status: /Civil Union     Spouse name: None   • Number of children: None   • Years of education: None   • Highest education level: None   Occupational History   • None   Tobacco Use   • Smoking status: Every Day     Packs/day: 0 25     Years: 50 00     Total pack years: 12 50     Types: Cigarettes     Start date: 0   • Smokeless tobacco: Never   Vaping Use   • Vaping Use: Never used   Substance and Sexual Activity   • Alcohol use: Not Currently     Comment: rare   • Drug use: Never   • Sexual activity: Yes   Other Topics Concern   • None   Social History Narrative   • None     Social Determinants of Health     Financial Resource Strain: Low Risk  (12/27/2022)    Overall Financial Resource Strain (CARDIA)    • Difficulty of Paying Living Expenses: Not hard at all   Food Insecurity: Not on file   Transportation Needs: No Transportation Needs (12/27/2022)    PRAPARE - Transportation    • Lack of Transportation (Medical): No    • Lack of Transportation (Non-Medical):  No   Physical Activity: Not on file   Stress: Not on file   Social Connections: Not on file   Intimate Partner Violence: Not on file   Housing Stability: Not on file     Social History     Tobacco Use   Smoking Status Every Day   • Packs/day: 0 25   • Years: 50 00   • Total pack years: 12 50   • Types: Cigarettes   • Start date: 0   Smokeless Tobacco Never     Family History   Problem Relation Age of Onset   • No "Known Problems Mother    • Hypertension Father        The following portions of the patient's history were reviewed and updated as appropriate allergies, current medications, past medical history, past social history, past surgical history and problem list    Imaging:    Results  Recent Results (from the past 1 hour(s))   POCT urine dip    Collection Time: 06/22/23  7:59 AM   Result Value Ref Range    LEUKOCYTE ESTERASE,UA +     NITRITE,UA -     SL AMB POCT UROBILINOGEN 0 2     POCT URINE PROTEIN -      PH,UA 6 5     BLOOD,UA +     SPECIFIC GRAVITY,UA 1 020     KETONES,UA -     BILIRUBIN,UA -     GLUCOSE, UA -      COLOR,UA yellow     CLARITY,UA clear    ]  No results found for: \"PSA\"  Lab Results   Component Value Date    CALCIUM 9 3 03/09/2023    K 3 7 03/09/2023    CO2 25 03/09/2023     (H) 03/09/2023    BUN 25 03/09/2023    CREATININE 0 89 03/09/2023     Lab Results   Component Value Date    WBC 6 58 03/09/2023    HGB 11 8 03/09/2023    HCT 35 8 03/09/2023    MCV 92 03/09/2023     03/09/2023       Please Note:  Voice dictation software has been used to create this document  There may be inadvertent transcriptions errors       BRANDON Cleveland 06/22/23   "

## 2023-06-23 ENCOUNTER — TELEPHONE (OUTPATIENT)
Dept: OTHER | Facility: OTHER | Age: 68
End: 2023-06-23

## 2023-06-23 DIAGNOSIS — N39.0 FREQUENT UTI: Primary | ICD-10-CM

## 2023-06-23 RX ORDER — CEPHALEXIN 500 MG/1
500 CAPSULE ORAL EVERY 6 HOURS SCHEDULED
Qty: 28 CAPSULE | Refills: 0 | Status: SHIPPED | OUTPATIENT
Start: 2023-06-23 | End: 2023-06-30

## 2023-06-23 NOTE — TELEPHONE ENCOUNTER
Contacted and spoke with the patient who reports she developed urinary frequency, intermittent chills and cloudy urine last evening  Urine culture from 6/22/2023 still in process  Patient requesting antibiotics for the weekend  Will send encounter to [facility]

## 2023-06-23 NOTE — TELEPHONE ENCOUNTER
Pt called the office to speak with someone regarding previous msg  Pt had urine test on Monday       Please review

## 2023-06-23 NOTE — TELEPHONE ENCOUNTER
Pt states that after her OV yesterday she started with urine frequency, chills and cloudiness   She would like a call back to advise what or if something can be given while she waits for the UC so she doesn't end up in the hospital

## 2023-06-23 NOTE — TELEPHONE ENCOUNTER
Please let patient know I have sent Keflex to her pharmacy due to upcoming weekend  We will call her if this needs to be changed based on her final C&S results

## 2023-06-24 LAB — BACTERIA UR CULT: ABNORMAL

## 2023-07-07 ENCOUNTER — TELEPHONE (OUTPATIENT)
Dept: UROLOGY | Facility: CLINIC | Age: 68
End: 2023-07-07

## 2023-07-07 ENCOUNTER — APPOINTMENT (OUTPATIENT)
Dept: LAB | Facility: CLINIC | Age: 68
End: 2023-07-07
Payer: COMMERCIAL

## 2023-07-07 DIAGNOSIS — N39.0 RECURRENT UTI: ICD-10-CM

## 2023-07-07 DIAGNOSIS — N39.0 RECURRENT UTI: Primary | ICD-10-CM

## 2023-07-07 PROCEDURE — 87086 URINE CULTURE/COLONY COUNT: CPT

## 2023-07-07 PROCEDURE — 87077 CULTURE AEROBIC IDENTIFY: CPT

## 2023-07-07 PROCEDURE — 87186 SC STD MICRODIL/AGAR DIL: CPT

## 2023-07-07 NOTE — TELEPHONE ENCOUNTER
6/22/23 pt had urine done. Pt stated she felt she had a UTI and was sent kephlex. PT stated she still is have frequency and pressure. Please advise if additional antibiotic is needed.      New culture placed

## 2023-07-08 LAB — BACTERIA UR CULT: ABNORMAL

## 2023-07-10 LAB — BACTERIA UR CULT: ABNORMAL

## 2023-07-11 ENCOUNTER — TELEPHONE (OUTPATIENT)
Dept: UROLOGY | Facility: CLINIC | Age: 68
End: 2023-07-11

## 2023-07-11 DIAGNOSIS — N39.0 RECURRENT UTI: Primary | ICD-10-CM

## 2023-07-11 RX ORDER — CIPROFLOXACIN 500 MG/1
500 TABLET, FILM COATED ORAL EVERY 12 HOURS SCHEDULED
Qty: 14 TABLET | Refills: 0 | Status: SHIPPED | OUTPATIENT
Start: 2023-07-11 | End: 2023-07-18

## 2023-07-11 NOTE — RESULT ENCOUNTER NOTE
Please let patient know that her urine culture is positive for Enterobacter cloacae. This does require contact precautions. I have sent a prescription for Ciprofloxacin to her pharmacy.

## 2023-07-11 NOTE — TELEPHONE ENCOUNTER
Called patient to let her know that her urine culture came back positive for infection. I informed the pt that ciprofloxacin was sent to her pharmacy. Pt confirmed understanding and had no further questions.

## 2023-07-11 NOTE — TELEPHONE ENCOUNTER
Patient returned call to RN. RN was with patient, patient told that some one would return her call later today.     Patient can be reached at

## 2023-07-11 NOTE — TELEPHONE ENCOUNTER
----- Message from [address] sent at 7/11/2023 11:15 AM EDT -----  Please let patient know that her urine culture is positive for Enterobacter cloacae. This does require contact precautions. I have sent a prescription for Ciprofloxacin to her pharmacy.

## 2023-08-19 ENCOUNTER — OFFICE VISIT (OUTPATIENT)
Dept: URGENT CARE | Facility: CLINIC | Age: 68
End: 2023-08-19
Payer: COMMERCIAL

## 2023-08-19 VITALS
HEART RATE: 102 BPM | TEMPERATURE: 98 F | HEIGHT: 65 IN | WEIGHT: 136 LBS | SYSTOLIC BLOOD PRESSURE: 157 MMHG | RESPIRATION RATE: 20 BRPM | BODY MASS INDEX: 22.66 KG/M2 | OXYGEN SATURATION: 99 % | DIASTOLIC BLOOD PRESSURE: 74 MMHG

## 2023-08-19 DIAGNOSIS — R31.9 URINARY TRACT INFECTION WITH HEMATURIA, SITE UNSPECIFIED: Primary | ICD-10-CM

## 2023-08-19 DIAGNOSIS — N39.0 URINARY TRACT INFECTION WITH HEMATURIA, SITE UNSPECIFIED: Primary | ICD-10-CM

## 2023-08-19 LAB
SL AMB  POCT GLUCOSE, UA: ABNORMAL
SL AMB LEUKOCYTE ESTERASE,UA: ABNORMAL
SL AMB POCT BILIRUBIN,UA: ABNORMAL
SL AMB POCT BLOOD,UA: ABNORMAL
SL AMB POCT CLARITY,UA: ABNORMAL
SL AMB POCT COLOR,UA: YELLOW
SL AMB POCT KETONES,UA: ABNORMAL
SL AMB POCT NITRITE,UA: ABNORMAL
SL AMB POCT PH,UA: 5
SL AMB POCT SPECIFIC GRAVITY,UA: 1.01
SL AMB POCT URINE PROTEIN: ABNORMAL
SL AMB POCT UROBILINOGEN: 0.2

## 2023-08-19 PROCEDURE — S9083 URGENT CARE CENTER GLOBAL: HCPCS | Performed by: PHYSICIAN ASSISTANT

## 2023-08-19 PROCEDURE — 81002 URINALYSIS NONAUTO W/O SCOPE: CPT | Performed by: PHYSICIAN ASSISTANT

## 2023-08-19 PROCEDURE — 87186 SC STD MICRODIL/AGAR DIL: CPT | Performed by: PHYSICIAN ASSISTANT

## 2023-08-19 PROCEDURE — 87077 CULTURE AEROBIC IDENTIFY: CPT | Performed by: PHYSICIAN ASSISTANT

## 2023-08-19 PROCEDURE — 87086 URINE CULTURE/COLONY COUNT: CPT | Performed by: PHYSICIAN ASSISTANT

## 2023-08-19 PROCEDURE — 99213 OFFICE O/P EST LOW 20 MIN: CPT | Performed by: PHYSICIAN ASSISTANT

## 2023-08-19 RX ORDER — CEPHALEXIN 500 MG/1
500 CAPSULE ORAL EVERY 8 HOURS SCHEDULED
Qty: 21 CAPSULE | Refills: 0 | Status: SHIPPED | OUTPATIENT
Start: 2023-08-19 | End: 2023-08-26

## 2023-08-19 NOTE — PROGRESS NOTES
[facility]    NAME: David Mistry is a 80 y.o. female  : 1955    MRN: [id number]  DATE: 2023  TIME: 4:39 PM    Assessment and Plan   Urinary tract infection with hematuria, site unspecified [N39.0, R31.9]  1. Urinary tract infection with hematuria, site unspecified  POCT urine dip    Urine culture    cephalexin (KEFLEX) 500 mg capsule          Patient Instructions     Patient Instructions   I have prescribed an antibiotic for the infection. Please take the antibiotic as prescribed and finish the entire prescription. I recommend that the patient takes an over the counter probiotic or eats yogurt with live cultures in it [place]) to keep good bacteria in the gut and help prevent diarrhea. If not improving over the next 7-10 days, follow up with PCP. Go to the emergency department if:   You have severe back, side, or abdominal pain. You have fever and shaking chills. You vomit several times in a row. Contact your primary care provider if:   Your symptoms do not go away, even after treatment. Drink more liquids. Liquids help flush out bacteria that may be causing an infection. Chief Complaint     Chief Complaint   Patient presents with   • Possible UTI     Urgency and frequency x 1 day slight burning        History of Present Illness   80year old female here with complaint of burning with urination. Difficulty urinating and frequency since last night. No fever, chills. No nausea, vomiting. No back pain or flank pain. Review of Systems   Review of Systems   Constitutional: Negative for activity change, appetite change, chills, fatigue and fever. Respiratory: Negative for cough. Cardiovascular: Negative for chest pain. Gastrointestinal: Negative for abdominal pain, constipation, diarrhea, nausea and vomiting. Genitourinary: Positive for difficulty urinating, dysuria and frequency. Negative for flank pain, hematuria and urgency. Musculoskeletal: Negative for back pain and myalgias. All other systems reviewed and are negative.       Current Medications     Current Outpatient Medications:   •  Ascorbic Acid (VITAMIN C PO), Take by mouth, Disp: , Rfl:   •  cephalexin (KEFLEX) 500 mg capsule, Take 1 capsule (500 mg total) by mouth every 8 (eight) hours for 7 days, Disp: 21 capsule, Rfl: 0  •  lisinopril (ZESTRIL) 20 mg tablet, Take 1 tablet (20 mg total) by mouth daily, Disp: 90 tablet, Rfl: 3  •  Multiple Vitamins-Minerals (ZINC PO), Take by mouth, Disp: , Rfl:   •  VITAMIN D PO, Take by mouth, Disp: , Rfl:   •  ondansetron (ZOFRAN-ODT) 4 mg disintegrating tablet, Take 1 tablet (4 mg total) by mouth every 8 (eight) hours as needed for nausea or vomiting for up to 20 doses (Patient not taking: Reported on 3/22/2023), Disp: 20 tablet, Rfl: 0    Current Allergies     Allergies as of 08/19/2023 - Reviewed 08/19/2023   Allergen Reaction Noted   • Sulfa antibiotics Hives 02/26/2016   • [place] [nitrofurantoin] Hives 03/09/2023          The following portions of the patient's history were reviewed and updated as appropriate: allergies, current medications, past family history, past medical history, past social history, past surgical history and problem list.   Past Medical History:   Diagnosis Date   • Allergic     seasonal     Past Surgical History:   Procedure Laterality Date   • COLONOSCOPY       Family History   Problem Relation Age of Onset   • No Known Problems Mother    • Hypertension Father      Social History     Socioeconomic History   • Marital status: /Civil Union     Spouse name: Not on file   • Number of children: Not on file   • Years of education: Not on file   • Highest education level: Not on file   Occupational History   • Not on file   Tobacco Use   • Smoking status: Every Day     Packs/day: 0.25     Years: 50.00     Total pack years: 12.50     Types: Cigarettes     Start date: 0   • Smokeless tobacco: Never   Vaping Use   • Vaping Use: Never used   Substance and Sexual Activity   • Alcohol use: Not Currently     Comment: rare   • Drug use: Never   • Sexual activity: Yes   Other Topics Concern   • Not on file   Social History Narrative   • Not on file     Social Determinants of Health     Financial Resource Strain: Low Risk  (12/27/2022)    Overall Financial Resource Strain (CARDIA)    • Difficulty of Paying Living Expenses: Not hard at all   Food Insecurity: Not on file   Transportation Needs: No Transportation Needs (12/27/2022)    PRAPARE - Transportation    • Lack of Transportation (Medical): No    • Lack of Transportation (Non-Medical): No   Physical Activity: Not on file   Stress: Not on file   Social Connections: Not on file   Intimate Partner Violence: Not on file   Housing Stability: Not on file     Medications have been verified. Objective   /74   Pulse 102   Temp 98 °F (36.7 °C)   Resp 20   Ht 5' 5" (1.651 m)   Wt 61.7 kg (136 lb)   LMP  (LMP Unknown)   SpO2 99%   BMI 22.63 kg/m²      Physical Exam   Physical Exam  Vitals and nursing note reviewed. Constitutional:       General: She is not in acute distress. Appearance: Normal appearance. She is well-developed. HENT:      Head: Normocephalic and atraumatic. Cardiovascular:      Rate and Rhythm: Normal rate and regular rhythm. Heart sounds: Normal heart sounds. No murmur heard. Pulmonary:      Effort: Pulmonary effort is normal. No respiratory distress. Breath sounds: Normal breath sounds. Abdominal:      General: Bowel sounds are normal.      Tenderness: There is no abdominal tenderness. There is no right CVA tenderness or left CVA tenderness.

## 2023-08-20 LAB — BACTERIA UR CULT: ABNORMAL

## 2023-08-21 LAB — BACTERIA UR CULT: ABNORMAL

## 2023-08-22 ENCOUNTER — HOSPITAL ENCOUNTER (OUTPATIENT)
Dept: ULTRASOUND IMAGING | Facility: HOSPITAL | Age: 68
Discharge: HOME | End: 2023-08-22
Payer: COMMERCIAL

## 2023-08-22 DIAGNOSIS — N20.0 NEPHROLITHIASIS: ICD-10-CM

## 2023-08-22 PROCEDURE — 76775 US EXAM ABDO BACK WALL LIM: CPT

## 2023-08-28 ENCOUNTER — RA CDI HCC (OUTPATIENT)
Dept: OTHER | Facility: HOSPITAL | Age: 68
End: 2023-08-28

## 2023-08-28 NOTE — PROGRESS NOTES
[facility] coding opportunities       Chart reviewed, no opportunity found: [address]        Patients Insurance     Medicare Insurance: [employer] Advantage

## 2023-09-01 ENCOUNTER — OFFICE VISIT (OUTPATIENT)
Dept: FAMILY MEDICINE CLINIC | Facility: CLINIC | Age: 68
End: 2023-09-01
Payer: COMMERCIAL

## 2023-09-01 VITALS
HEIGHT: 65 IN | BODY MASS INDEX: 23.06 KG/M2 | SYSTOLIC BLOOD PRESSURE: 138 MMHG | TEMPERATURE: 96.3 F | OXYGEN SATURATION: 99 % | WEIGHT: 138.4 LBS | DIASTOLIC BLOOD PRESSURE: 88 MMHG | HEART RATE: 91 BPM

## 2023-09-01 DIAGNOSIS — Z53.20 MAMMOGRAM DECLINED: ICD-10-CM

## 2023-09-01 DIAGNOSIS — N83.8 OVARIAN MASS, RIGHT: ICD-10-CM

## 2023-09-01 DIAGNOSIS — Z79.899 ENCOUNTER FOR LONG-TERM (CURRENT) USE OF MEDICATIONS: ICD-10-CM

## 2023-09-01 DIAGNOSIS — I10 ESSENTIAL HYPERTENSION, BENIGN: Primary | ICD-10-CM

## 2023-09-01 DIAGNOSIS — Z11.59 ENCOUNTER FOR HEPATITIS C SCREENING TEST FOR LOW RISK PATIENT: ICD-10-CM

## 2023-09-01 DIAGNOSIS — E78.00 PURE HYPERCHOLESTEROLEMIA: ICD-10-CM

## 2023-09-01 PROCEDURE — 99214 OFFICE O/P EST MOD 30 MIN: CPT | Performed by: FAMILY MEDICINE

## 2023-09-01 NOTE — PROGRESS NOTES
Name: Basia Smith      : 1955      MRN: [id number]  Encounter Provider: Erlinda Lazcano DO  Encounter Date: 2023   Encounter department: [address] [place] [address] PRIMARY CARE    Assessment & Plan     1. Essential hypertension, benign  Assessment & Plan:  Patient has hypertension. I checked her blood pressure myself today and found her blood pressure to be 134/76. I reassured the patient and told her that her blood pressure is satisfactory. I asked her to continue to monitor blood pressure at home. Continue lisinopril 20 mg daily. 2. Ovarian mass, right  Assessment & Plan:  Patient still has not had any follow-up for her right ovarian mass. I reviewed her diagnostic studies and it is quite large. She tells me she did speak with the gynecologist who went to speak with a gynecologic oncologist to get back to her. She states she never heard anything back from her gynecologist and the patient never pursued it. This is really not something she can ignore. Needs to be evaluated. I am going to refer the patient to Dr. Geneva Osorio. Orders:  -     Ambulatory Referral to Gynecologic Oncology; Future    3. Pure hypercholesterolemia  -     Comprehensive metabolic panel; Future  -     Lipid panel; Future    4. Encounter for hepatitis C screening test for low risk patient  -     Hepatitis C antibody; Future    5. Encounter for long-term (current) use of medications  -     CBC and differential; Future    6. Mammogram declined  Assessment & Plan:  I again recommended a screening mammogram for the patient she again declined. I also recommended a bone density for the patient and she also declined this. Subjective      This is a 72-year-old white female who presents to the office today for her routine checkup. Patient is doing well and has no complaints. She tells me she does check her blood pressures at home. She tells me her blood pressure at home this morning was 128/76.   She tells me her blood pressure at home is generally well controlled. She believes she gets nervous when coming here. Review of Systems   Constitutional: Negative for unexpected weight change. Respiratory: Negative for cough and shortness of breath. Cardiovascular: Negative for chest pain, palpitations and leg swelling. Gastrointestinal: Negative for abdominal distention, abdominal pain, blood in stool, constipation, diarrhea and nausea. Genitourinary: Negative for pelvic pain. Current Outpatient Medications on File Prior to Visit   Medication Sig   • Ascorbic Acid (VITAMIN C PO) Take by mouth   • lisinopril (ZESTRIL) 20 mg tablet Take 1 tablet (20 mg total) by mouth daily   • Multiple Vitamins-Minerals (ZINC PO) Take by mouth   • VITAMIN D PO Take by mouth       Objective     /88 (BP Location: Left arm, Patient Position: Sitting, Cuff Size: Adult)   Pulse 91   Temp (!) 96.3 °F (35.7 °C) (Tympanic)   Ht 5' 5" (1.651 m)   Wt 62.8 kg (138 lb 6.4 oz)   LMP  (LMP Unknown)   SpO2 99%   BMI 23.03 kg/m²     Physical Exam  Vitals reviewed. Constitutional:       Comments: Patient is a 73-year-old white female who appears her stated age. She is pleasant, cooperative, and in no distress   HENT:      Head: Normocephalic and atraumatic. Right Ear: Tympanic membrane, ear canal and external ear normal. There is no impacted cerumen. Left Ear: Tympanic membrane, ear canal and external ear normal. There is no impacted cerumen. Mouth/Throat:      Mouth: Mucous membranes are moist.      Pharynx: Oropharynx is clear. No oropharyngeal exudate or posterior oropharyngeal erythema. Eyes:      General: No scleral icterus. Right eye: No discharge. Left eye: No discharge. Conjunctiva/sclera: Conjunctivae normal.      Pupils: Pupils are equal, round, and reactive to light. Neck:      Vascular: No carotid bruit.       Comments: No thyromegaly was appreciated  Cardiovascular:      Rate and Rhythm: Normal rate and regular rhythm. Heart sounds: Normal heart sounds. No murmur heard. No friction rub. No gallop. Pulmonary:      Effort: Pulmonary effort is normal. No respiratory distress. Breath sounds: Normal breath sounds. No stridor. No wheezing, rhonchi or rales. Abdominal:      General: Bowel sounds are normal. There is no distension. Palpations: Abdomen is soft. There is no mass. Tenderness: There is no abdominal tenderness. There is no guarding. Comments: No hepatosplenomegaly   Musculoskeletal:      Cervical back: Neck supple. Lymphadenopathy:      Cervical: No cervical adenopathy. Psychiatric:         Mood and Affect: Mood normal.         Behavior: Behavior normal.         Thought Content:  Thought content normal.         Judgment: Judgment normal.     Extremities: Without cyanosis, clubbing, or edema    Bee Brinks, DO

## 2023-09-05 ENCOUNTER — TELEPHONE (OUTPATIENT)
Dept: HEMATOLOGY ONCOLOGY | Facility: CLINIC | Age: 68
End: 2023-09-05

## 2023-09-05 DIAGNOSIS — R19.00 PELVIC MASS: Primary | ICD-10-CM

## 2023-09-05 NOTE — TELEPHONE ENCOUNTER
I called Yu Smith in response to a referral that was received for patient to establish care with Gynecologic Oncology. Outreach was made to complete patient's intake questionnaire . Patient's intake questionnaire was reviewed and complete. Patient's intake has been sent to the team for clinical review.

## 2023-09-06 PROBLEM — Z79.899 ENCOUNTER FOR LONG-TERM (CURRENT) USE OF MEDICATIONS: Status: ACTIVE | Noted: 2023-09-06

## 2023-09-06 PROBLEM — Z11.59 ENCOUNTER FOR HEPATITIS C SCREENING TEST FOR LOW RISK PATIENT: Status: ACTIVE | Noted: 2023-09-06

## 2023-09-06 NOTE — ASSESSMENT & PLAN NOTE
Patient still has not had any follow-up for her right ovarian mass. I reviewed her diagnostic studies and it is quite large. She tells me she did speak with the gynecologist who went to speak with a gynecologic oncologist to get back to her. She states she never heard anything back from her gynecologist and the patient never pursued it. This is really not something she can ignore. Needs to be evaluated. I am going to refer the patient to Dr. Ludin Cruz.

## 2023-09-06 NOTE — ASSESSMENT & PLAN NOTE
I again recommended a screening mammogram for the patient she again declined. I also recommended a bone density for the patient and she also declined this.

## 2023-09-06 NOTE — ASSESSMENT & PLAN NOTE
Patient has hypertension. I checked her blood pressure myself today and found her blood pressure to be 134/76. I reassured the patient and told her that her blood pressure is satisfactory. I asked her to continue to monitor blood pressure at home. Continue lisinopril 20 mg daily.

## 2023-09-07 ENCOUNTER — PATIENT OUTREACH (OUTPATIENT)
Dept: CASE MANAGEMENT | Facility: HOSPITAL | Age: 68
End: 2023-09-07

## 2023-09-07 ENCOUNTER — APPOINTMENT (OUTPATIENT)
Dept: LAB | Facility: CLINIC | Age: 68
End: 2023-09-07
Payer: COMMERCIAL

## 2023-09-07 DIAGNOSIS — N39.0 RECURRENT UTI: Primary | ICD-10-CM

## 2023-09-07 LAB
BACTERIA UR QL AUTO: ABNORMAL /HPF
BILIRUB UR QL STRIP: NEGATIVE
CLARITY UR: CLEAR
COLOR UR: COLORLESS
GLUCOSE UR STRIP-MCNC: NEGATIVE MG/DL
HGB UR QL STRIP.AUTO: NEGATIVE
KETONES UR STRIP-MCNC: NEGATIVE MG/DL
LEUKOCYTE ESTERASE UR QL STRIP: ABNORMAL
NITRITE UR QL STRIP: NEGATIVE
NON-SQ EPI CELLS URNS QL MICRO: ABNORMAL /HPF
PH UR STRIP.AUTO: 6 [PH]
PROT UR STRIP-MCNC: NEGATIVE MG/DL
RBC #/AREA URNS AUTO: ABNORMAL /HPF
SP GR UR STRIP.AUTO: 1 (ref 1–1.03)
UROBILINOGEN UR STRIP-ACNC: <2 MG/DL
WBC #/AREA URNS AUTO: ABNORMAL /HPF

## 2023-09-07 PROCEDURE — 87186 SC STD MICRODIL/AGAR DIL: CPT

## 2023-09-07 PROCEDURE — 87077 CULTURE AEROBIC IDENTIFY: CPT

## 2023-09-07 PROCEDURE — 87086 URINE CULTURE/COLONY COUNT: CPT

## 2023-09-07 PROCEDURE — 81001 URINALYSIS AUTO W/SCOPE: CPT

## 2023-09-07 NOTE — PROGRESS NOTES
[facility] received referral for patient. Patient has consult scheduled with Dr. Rina Cadet on 9/19/23. [facility] will follow for dx and plan.

## 2023-09-09 LAB — BACTERIA UR CULT: ABNORMAL

## 2023-09-11 DIAGNOSIS — N39.0 URINARY TRACT INFECTION WITHOUT HEMATURIA, SITE UNSPECIFIED: Primary | ICD-10-CM

## 2023-09-11 RX ORDER — CEPHALEXIN 500 MG/1
500 CAPSULE ORAL EVERY 6 HOURS SCHEDULED
Qty: 28 CAPSULE | Refills: 0 | Status: SHIPPED | OUTPATIENT
Start: 2023-09-11 | End: 2023-09-15 | Stop reason: ALTCHOICE

## 2023-09-11 NOTE — RESULT ENCOUNTER NOTE
Please let patient know her urine culture is positive. I have sent a prescription to her pharmacy for Keflex. Recent US showed mild fullness or right renal collecting systems. Has she been experiencing and flank pain, gross hematuria, fevers, or chills?

## 2023-09-12 ENCOUNTER — TELEPHONE (OUTPATIENT)
Dept: UROLOGY | Facility: CLINIC | Age: 68
End: 2023-09-12

## 2023-09-12 DIAGNOSIS — N39.0 URINARY TRACT INFECTION WITHOUT HEMATURIA, SITE UNSPECIFIED: Primary | ICD-10-CM

## 2023-09-12 DIAGNOSIS — N39.0 RECURRENT UTI: ICD-10-CM

## 2023-09-12 NOTE — TELEPHONE ENCOUNTER
Called patient and left a detailed vm per communication consent. I informed the pt that her urine culture is positive and that a prescription was sent to her pharmacy for Keflex. Recent US showed mild fullness. Informed the pt to call us back if she has been experiencing any flank pain, gross hematuria, fevers, or chills.

## 2023-09-12 NOTE — TELEPHONE ENCOUNTER
Pt called back and stated that she has a little flank pain in her back but she thinks it is from working in the [employer]. But other than that she is fine.      Pt can be reached at [phone]

## 2023-09-12 NOTE — TELEPHONE ENCOUNTER
----- Message from [address] sent at 9/11/2023 12:23 PM EDT -----  Please let patient know her urine culture is positive. I have sent a prescription to her pharmacy for Keflex. Recent US showed mild fullness or right renal collecting systems. Has she been experiencing and flank pain, gross hematuria, fevers, or chills?

## 2023-09-15 RX ORDER — CIPROFLOXACIN 500 MG/1
500 TABLET, FILM COATED ORAL EVERY 12 HOURS SCHEDULED
Qty: 10 TABLET | Refills: 0 | Status: SHIPPED | OUTPATIENT
Start: 2023-09-15 | End: 2023-09-20

## 2023-09-15 NOTE — TELEPHONE ENCOUNTER
Spoke with patient and advised.  Will get new medication and complete urine 2 days after she finishes

## 2023-09-15 NOTE — TELEPHONE ENCOUNTER
I have sent new prescription for Ciprofloxacin to her pharmacy. She should stop Keflex. I also recommend repeat urine testing 2 days after completion of Cipro.  Orders placed

## 2023-09-15 NOTE — TELEPHONE ENCOUNTER
Patient called stating she still having urgency and she stated it started yesterday. Patient is still taking Keflex and its not working she wants to know how to proceed. She wants to know if she needs a new medication.       Patient can be reached at [phone]

## 2023-09-15 NOTE — TELEPHONE ENCOUNTER
Returned call to patient. Reports having urgency and discomfort like the first day. Pt unsure what to do. Reports has been taking antibiotics. Wants to know if medication needs to be changed.  Please advise

## 2023-09-19 ENCOUNTER — PATIENT OUTREACH (OUTPATIENT)
Dept: CASE MANAGEMENT | Facility: HOSPITAL | Age: 68
End: 2023-09-19

## 2023-09-19 ENCOUNTER — OFFICE VISIT (OUTPATIENT)
Dept: GYNECOLOGIC ONCOLOGY | Facility: CLINIC | Age: 68
End: 2023-09-19
Payer: COMMERCIAL

## 2023-09-19 VITALS
WEIGHT: 135 LBS | DIASTOLIC BLOOD PRESSURE: 80 MMHG | HEART RATE: 99 BPM | OXYGEN SATURATION: 97 % | SYSTOLIC BLOOD PRESSURE: 150 MMHG | BODY MASS INDEX: 22.47 KG/M2 | TEMPERATURE: 97.9 F

## 2023-09-19 DIAGNOSIS — N83.8 OVARIAN MASS, RIGHT: Primary | ICD-10-CM

## 2023-09-19 DIAGNOSIS — Z12.4 CERVICAL CANCER SCREENING: ICD-10-CM

## 2023-09-19 PROCEDURE — 99204 OFFICE O/P NEW MOD 45 MIN: CPT | Performed by: OBSTETRICS & GYNECOLOGY

## 2023-09-19 PROCEDURE — 88175 CYTOPATH C/V AUTO FLUID REDO: CPT | Performed by: OBSTETRICS & GYNECOLOGY

## 2023-09-19 PROCEDURE — 87624 HPV HI-RISK TYP POOLED RSLT: CPT | Performed by: OBSTETRICS & GYNECOLOGY

## 2023-09-19 NOTE — LETTER
September 19, 2023     [facility], [address]  [address]  [facility] [address]    Patient: Suha Frederick   YOB: 1955   Date of Visit: 9/19/2023       Dear Dr. Bert Hatch:    Thank you for referring Suha Frederick to me for evaluation. Below are my notes for this consultation. If you have questions, please do not hesitate to call me. I look forward to following your patient along with you. Sincerely,        Anayeli Zuleta MD        CC: No Recipients    Anayeli Zuleta MD  9/19/2023  1:45 PM  Incomplete  Assessment/Plan:    Problem List Items Addressed This Visit          Other    Ovarian mass, right - Primary     Patient is a very pleasant 60-year-old female with a history of slowly progressive right ovarian cyst over the course of a year or so. The patient is minimally symptomatic from this. The exam is suggestive of this mass but not 100% confirmatory. We have recommended following up with an ultrasound to clearly document the mass and risk of malignancy. At this point we have quoted the risk of malignancy approximately 5 to 8% given the persistent postmenopausal 8 cm mass which has been visualized for a little less than a year. We discussed the options of immediate surgery with ultrasound confirmation versus moving ahead with ultrasound confirmation prior to planning surgery. Patient is very afraid of having surgery and would like to go this stepwise. We will order the ultrasound and see her back after that to consider surgery. In anticipation of likely surgery a Pap smear was performed. Relevant Orders    US pelvis complete w transvaginal     Other Visit Diagnoses       Cervical cancer screening        Relevant Orders    Liquid-based pap, diagnostic                CHIEF COMPLAINT:       Subjective:     Problem:  Cancer Staging   No matching staging information was found for the patient.     Previous therapy:  Oncology History    No history exists. Patient ID: Laura Irvin is a 80 y.o. female  Patient is a very pleasant 72-year-old female with was seen in the emergency department approximately 1 year ago due to new onset flank pain. She was diagnosed with kidney stone. She was noted to have a ovarian mass on CAT scan at diagnosis of the kidney stone. She was originally noted to have this approximately 1 year ago. She underwent a follow-up CAT scan in March 2023 which revealed the following:  IMPRESSION:     Two of the previously seen right-sided intrarenal calculi measuring up to 7 mm have migrated into the renal pelvis. Moderate urothelial thickening in the right collecting system, likely infectious. No evidence of pyelonephritis or perinephric abscess. No ureteral calculi or hydronephrosis. Droplet of air in the urinary bladder, which in the absence of recent instrumentation suggests cystitis. No other acute abdominal pelvic pathology. Stable right renal artery aneurysm. 9.3 x 6.8 cm cystic lesion in the right adnexa better assessed on the earlier pelvic ultrasound. Additional findings as above. Ultrasound in approximately August 2022 reveals the following:     FINDINGS:     UTERUS:  The uterus is anteverted in position, measuring 9.1 x 4.8 x 8.0 cm. Multiple calcified uterine fibroids are identified. The largest measured fibroid is 3.5 cm. The cervix appears within normal limits. ENDOMETRIUM:    The endometrial echo complex has an AP caliber of 7.0 mm. There is a small amount of fluid in the endometrial cavity. A sessile nodular structure measures 11 x 5 mm with small cystic areas. OVARIES/ADNEXA:  Large cystic structure in the right adnexa measures 8.5 x 6.2 x 4.0 cm. Difficult to identify right ovarian tissue. Left ovary not visualized. No free fluid. IMPRESSION:     1. Fibroid uterus. 2.  Sessile nodular structure in the endometrium may represent a sessile polyp. Recommend correlation with hysteroscopy. 3.  Large cystic structure in the right adnexa measuring up to 8.5 cm. This is not significantly changed from the ultrasound of April 2022. If surgical intervention is not planned, recommend ultrasound follow-up in 6 months or further evaluation with a   pelvic MRI. Today, the patient is doing well. She denies significant abdominal pain, pelvic pain, nausea, vomiting, constipation, diarrhea, fevers, chills, or vaginal bleeding. She does note intermittent right medial thigh pain especially upon arising after being seated. It does not cause significant problems but it is relatively new. She has no other issues. Patient has no family history of ovarian or breast cancer. Review of Systems   Constitutional: Negative. HENT: Negative. Eyes: Negative. Respiratory: Negative. Cardiovascular: Negative. Gastrointestinal: Negative. Endocrine: Negative. Genitourinary: Positive for dysuria. Musculoskeletal: Negative. Skin: Negative. Neurological: Negative. Hematological: Negative. Psychiatric/Behavioral: Negative. Current Outpatient Medications   Medication Sig Dispense Refill   • Ascorbic Acid (VITAMIN C PO) Take by mouth     • ciprofloxacin (CIPRO) 500 mg tablet Take 1 tablet (500 mg total) by mouth every 12 (twelve) hours for 5 days 10 tablet 0   • lisinopril (ZESTRIL) 20 mg tablet Take 1 tablet (20 mg total) by mouth daily 90 tablet 3   • Multiple Vitamins-Minerals (ZINC PO) Take by mouth     • VITAMIN D PO Take by mouth       No current facility-administered medications for this visit. Allergies   Allergen Reactions   • Sulfa Antibiotics Hives     Palpitations of heart   • Macrobid [Nitrofurantoin] Hives       Past Medical History:   Diagnosis Date   • Allergic     seasonal       Past Surgical History:   Procedure Laterality Date   • COLONOSCOPY         OB History    No obstetric history on file.          Family History Problem Relation Age of Onset   • No Known Problems Mother    • Hypertension Father        The following portions of the patient's history were reviewed and updated as appropriate: allergies, current medications, past medical history, past social history, past surgical history and problem list.      Objective:    Blood pressure 150/80, pulse 99, temperature 97.9 °F (36.6 °C), temperature source Temporal, weight 61.2 kg (135 lb), SpO2 97 %. Body mass index is 22.47 kg/m². Physical Exam  Constitutional:       Appearance: She is well-developed. HENT:      Head: Normocephalic and atraumatic. Eyes:      Pupils: Pupils are equal, round, and reactive to light. Cardiovascular:      Rate and Rhythm: Normal rate and regular rhythm. Heart sounds: Normal heart sounds. Pulmonary:      Effort: Pulmonary effort is normal. No respiratory distress. Breath sounds: Normal breath sounds. Abdominal:      General: Bowel sounds are normal. There is no distension. Palpations: Abdomen is soft. Abdomen is not rigid. Tenderness: There is no abdominal tenderness. There is no guarding or rebound. Genitourinary:     Comments: -Normal external female genitalia, normal Bartholin's and Oconto's glands                  -Normal midline urethral meatus. No lesions notes                  -Bladder without fullness mass or tenderness                  -Vagina without lesion or discharge No significant cystocele or rectocele noted                  -Cervix normal appearing without visible lesions                  -Uterus with normal contour, mobility. No tenderness,                  -Adnexae without obvious mass or tenderness. There is no extraovarian nodularity. There is a suggestion of a fullness higher in the pelvis on the right. - Anus without fissure of lesion    Musculoskeletal:         General: Normal range of motion. Cervical back: Normal range of motion and neck supple.    Lymphadenopathy: Cervical: No cervical adenopathy. Upper Body:      Right upper body: No supraclavicular adenopathy. Left upper body: No supraclavicular adenopathy. Skin:     General: Skin is warm and dry. Neurological:      Mental Status: She is alert and oriented to person, place, and time.    Psychiatric:         Behavior: Behavior normal.           No results found for: ""  Lab Results   Component Value Date    WBC 6.58 03/09/2023    HGB 11.8 03/09/2023    HCT 35.8 03/09/2023    MCV 92 03/09/2023     03/09/2023     Lab Results   Component Value Date    K 3.7 03/09/2023     (H) 03/09/2023    CO2 25 03/09/2023    BUN 25 03/09/2023    CREATININE 0.89 03/09/2023    GLUF 101 (H) 12/21/2022    CALCIUM 9.3 03/09/2023    AST 11 (L) 03/09/2023    ALT 8 03/09/2023    ALKPHOS 44 03/09/2023    EGFR 67 03/09/2023        Trend:  No results found for: ""

## 2023-09-19 NOTE — ASSESSMENT & PLAN NOTE
Patient is a very pleasant 50-year-old female with a history of slowly progressive right ovarian cyst over the course of a year or so. The patient is minimally symptomatic from this. The exam is suggestive of this mass but not 100% confirmatory. We have recommended following up with an ultrasound to clearly document the mass and risk of malignancy. At this point we have quoted the risk of malignancy approximately 5 to 8% given the persistent postmenopausal 8 cm mass which has been visualized for a little less than a year. We discussed the options of immediate surgery with ultrasound confirmation versus moving ahead with ultrasound confirmation prior to planning surgery. Patient is very afraid of having surgery and would like to go this stepwise. We will order the ultrasound and see her back after that to consider surgery. In anticipation of likely surgery a Pap smear was performed.

## 2023-09-19 NOTE — PROGRESS NOTES
Assessment/Plan:    Problem List Items Addressed This Visit        Other    Ovarian mass, right - Primary     Patient is a very pleasant 46-year-old female with a history of slowly progressive right ovarian cyst over the course of a year or so. The patient is minimally symptomatic from this. The exam is suggestive of this mass but not 100% confirmatory. We have recommended following up with an ultrasound to clearly document the mass and risk of malignancy. At this point we have quoted the risk of malignancy approximately 5 to 8% given the persistent postmenopausal 8 cm mass which has been visualized for a little less than a year. We discussed the options of immediate surgery with ultrasound confirmation versus moving ahead with ultrasound confirmation prior to planning surgery. Patient is very afraid of having surgery and would like to go this stepwise. We will order the ultrasound and see her back after that to consider surgery. In anticipation of likely surgery a Pap smear was performed. Relevant Orders    US pelvis complete w transvaginal   Other Visit Diagnoses     Cervical cancer screening        Relevant Orders    Liquid-based pap, diagnostic              CHIEF COMPLAINT: Right ovarian cyst        Patient ID: Jose Roger is a 80 y.o. female  Patient is a very pleasant 46-year-old female with was seen in the emergency department approximately 1 year ago due to new onset flank pain. She was diagnosed with kidney stone. She was noted to have a ovarian mass on CAT scan at diagnosis of the kidney stone. She was originally noted to have this approximately 1 year ago. She underwent a follow-up CAT scan in March 2023 which revealed the following:  IMPRESSION:     Two of the previously seen right-sided intrarenal calculi measuring up to 7 mm have migrated into the renal pelvis.   Moderate urothelial thickening in the right collecting system, likely infectious.       No evidence of pyelonephritis or perinephric abscess.     No ureteral calculi or hydronephrosis.     Droplet of air in the urinary bladder, which in the absence of recent instrumentation suggests cystitis.     No other acute abdominal pelvic pathology.     Stable right renal artery aneurysm.     9.3 x 6.8 cm cystic lesion in the right adnexa better assessed on the earlier pelvic ultrasound.     Additional findings as above. Ultrasound in approximately August 2022 reveals the following:     FINDINGS:     UTERUS:  The uterus is anteverted in position, measuring 9.1 x 4.8 x 8.0 cm. Multiple calcified uterine fibroids are identified. The largest measured fibroid is 3.5 cm. The cervix appears within normal limits.     ENDOMETRIUM:    The endometrial echo complex has an AP caliber of 7.0 mm. There is a small amount of fluid in the endometrial cavity. A sessile nodular structure measures 11 x 5 mm with small cystic areas.     OVARIES/ADNEXA:  Large cystic structure in the right adnexa measures 8.5 x 6.2 x 4.0 cm. Difficult to identify right ovarian tissue.      Left ovary not visualized.      No free fluid.     IMPRESSION:     1. Fibroid uterus.     2.  Sessile nodular structure in the endometrium may represent a sessile polyp. Recommend correlation with hysteroscopy.     3.  Large cystic structure in the right adnexa measuring up to 8.5 cm. This is not significantly changed from the ultrasound of April 2022. If surgical intervention is not planned, recommend ultrasound follow-up in 6 months or further evaluation with a   pelvic MRI. Today, the patient is doing well. She denies significant abdominal pain, pelvic pain, nausea, vomiting, constipation, diarrhea, fevers, chills, or vaginal bleeding. She does note intermittent right medial thigh pain especially upon arising after being seated. It does not cause significant problems but it is relatively new. She has no other issues.     Patient has no family history of ovarian or breast cancer. Review of Systems   Constitutional: Negative. HENT: Negative. Eyes: Negative. Respiratory: Negative. Cardiovascular: Negative. Gastrointestinal: Negative. Endocrine: Negative. Genitourinary: Positive for dysuria. Musculoskeletal: Negative. Skin: Negative. Neurological: Negative. Hematological: Negative. Psychiatric/Behavioral: Negative. Current Outpatient Medications   Medication Sig Dispense Refill   • Ascorbic Acid (VITAMIN C PO) Take by mouth     • ciprofloxacin (CIPRO) 500 mg tablet Take 1 tablet (500 mg total) by mouth every 12 (twelve) hours for 5 days 10 tablet 0   • lisinopril (ZESTRIL) 20 mg tablet Take 1 tablet (20 mg total) by mouth daily 90 tablet 3   • Multiple Vitamins-Minerals (ZINC PO) Take by mouth     • VITAMIN D PO Take by mouth       No current facility-administered medications for this visit. Allergies   Allergen Reactions   • Sulfa Antibiotics Hives     Palpitations of heart   • Macrobid [Nitrofurantoin] Hives       Past Medical History:   Diagnosis Date   • Allergic     seasonal       Past Surgical History:   Procedure Laterality Date   • COLONOSCOPY         OB History    No obstetric history on file. Family History   Problem Relation Age of Onset   • No Known Problems Mother    • Hypertension Father        The following portions of the patient's history were reviewed and updated as appropriate: allergies, current medications, past medical history, past social history, past surgical history and problem list.      Objective:    Blood pressure 150/80, pulse 99, temperature 97.9 °F (36.6 °C), temperature source Temporal, weight 61.2 kg (135 lb), SpO2 97 %. Body mass index is 22.47 kg/m². Physical Exam  Constitutional:       Appearance: She is well-developed. HENT:      Head: Normocephalic and atraumatic. Eyes:      Pupils: Pupils are equal, round, and reactive to light.    Cardiovascular:      Rate and Rhythm: Normal rate and regular rhythm. Heart sounds: Normal heart sounds. Pulmonary:      Effort: Pulmonary effort is normal. No respiratory distress. Breath sounds: Normal breath sounds. Abdominal:      General: Bowel sounds are normal. There is no distension. Palpations: Abdomen is soft. Abdomen is not rigid. Tenderness: There is no abdominal tenderness. There is no guarding or rebound. Genitourinary:     Comments: -Normal external female genitalia, normal Bartholin's and [place]'s glands                  -Normal midline urethral meatus. No lesions notes                  -Bladder without fullness mass or tenderness                  -Vagina without lesion or discharge No significant cystocele or rectocele noted                  -Cervix normal appearing without visible lesions                  -Uterus with normal contour, mobility. No tenderness,                  -Adnexae without obvious mass or tenderness. There is no extraovarian nodularity. There is a suggestion of a fullness higher in the pelvis on the right.                  - Anus without fissure of lesion    Musculoskeletal:         General: Normal range of motion. Cervical back: Normal range of motion and neck supple. Lymphadenopathy:      Cervical: No cervical adenopathy. Upper Body:      Right upper body: No supraclavicular adenopathy. Left upper body: No supraclavicular adenopathy. Skin:     General: Skin is warm and dry. Neurological:      Mental Status: She is alert and oriented to person, place, and time.    Psychiatric:         Behavior: Behavior normal.           No results found for: ""  Lab Results   Component Value Date    WBC 6.58 03/09/2023    HGB 11.8 03/09/2023    HCT 35.8 03/09/2023    MCV 92 03/09/2023     03/09/2023     Lab Results   Component Value Date    K 3.7 03/09/2023     (H) 03/09/2023    CO2 25 03/09/2023    BUN 25 03/09/2023    CREATININE 0.89 03/09/2023    GLUF 101 (H) 12/21/2022    CALCIUM 9.3 03/09/2023    AST 11 (L) 03/09/2023    ALT 8 03/09/2023    ALKPHOS 44 03/09/2023    EGFR 67 03/09/2023        Trend:  No results found for: ""

## 2023-09-20 NOTE — PROGRESS NOTES
OSW completed chart review. Patient saw Dr. Fidel Wei, 9/19/23. Plan is for u/s on 9/22/23 prior to consideration of surgery. Patient will see Dr. Fidle Wei on 10/9/23 to review results and develop plan.

## 2023-09-21 LAB
HPV HR 12 DNA CVX QL NAA+PROBE: NEGATIVE
HPV16 DNA CVX QL NAA+PROBE: NEGATIVE
HPV18 DNA CVX QL NAA+PROBE: NEGATIVE

## 2023-09-22 ENCOUNTER — APPOINTMENT (OUTPATIENT)
Dept: LAB | Facility: CLINIC | Age: 68
End: 2023-09-22
Payer: COMMERCIAL

## 2023-09-22 ENCOUNTER — HOSPITAL ENCOUNTER (OUTPATIENT)
Dept: ULTRASOUND IMAGING | Facility: HOSPITAL | Age: 68
Discharge: HOME | End: 2023-09-22
Payer: COMMERCIAL

## 2023-09-22 DIAGNOSIS — N39.0 RECURRENT UTI: ICD-10-CM

## 2023-09-22 DIAGNOSIS — Z79.899 ENCOUNTER FOR LONG-TERM (CURRENT) USE OF MEDICATIONS: ICD-10-CM

## 2023-09-22 DIAGNOSIS — Z11.59 ENCOUNTER FOR HEPATITIS C SCREENING TEST FOR LOW RISK PATIENT: ICD-10-CM

## 2023-09-22 DIAGNOSIS — E78.00 PURE HYPERCHOLESTEROLEMIA: ICD-10-CM

## 2023-09-22 DIAGNOSIS — N83.8 OVARIAN MASS, RIGHT: ICD-10-CM

## 2023-09-22 LAB
ALBUMIN SERPL BCP-MCNC: 4 G/DL (ref 3.5–5)
ALP SERPL-CCNC: 42 U/L (ref 34–104)
ALT SERPL W P-5'-P-CCNC: 9 U/L (ref 7–52)
ANION GAP SERPL CALCULATED.3IONS-SCNC: 7 MMOL/L
AST SERPL W P-5'-P-CCNC: 14 U/L (ref 13–39)
BASOPHILS # BLD AUTO: 0.06 THOUSANDS/ÂΜL (ref 0–0.1)
BASOPHILS NFR BLD AUTO: 1 % (ref 0–1)
BILIRUB SERPL-MCNC: 0.49 MG/DL (ref 0.2–1)
BUN SERPL-MCNC: 20 MG/DL (ref 5–25)
CALCIUM SERPL-MCNC: 8.9 MG/DL (ref 8.4–10.2)
CHLORIDE SERPL-SCNC: 105 MMOL/L (ref 96–108)
CHOLEST SERPL-MCNC: 240 MG/DL
CO2 SERPL-SCNC: 26 MMOL/L (ref 21–32)
CREAT SERPL-MCNC: 0.79 MG/DL (ref 0.6–1.3)
EOSINOPHIL # BLD AUTO: 0.24 THOUSAND/ÂΜL (ref 0–0.61)
EOSINOPHIL NFR BLD AUTO: 3 % (ref 0–6)
ERYTHROCYTE [DISTWIDTH] IN BLOOD BY AUTOMATED COUNT: 13.2 % (ref 11.6–15.1)
GFR SERPL CREATININE-BSD FRML MDRD: 77 ML/MIN/1.73SQ M
GLUCOSE P FAST SERPL-MCNC: 87 MG/DL (ref 65–99)
HCT VFR BLD AUTO: 39.2 % (ref 34.8–46.1)
HCV AB SER QL: NORMAL
HDLC SERPL-MCNC: 50 MG/DL
HGB BLD-MCNC: 12.6 G/DL (ref 11.5–15.4)
IMM GRANULOCYTES # BLD AUTO: 0.01 THOUSAND/UL (ref 0–0.2)
IMM GRANULOCYTES NFR BLD AUTO: 0 % (ref 0–2)
LDLC SERPL CALC-MCNC: 169 MG/DL (ref 0–100)
LYMPHOCYTES # BLD AUTO: 1.82 THOUSANDS/ÂΜL (ref 0.6–4.47)
LYMPHOCYTES NFR BLD AUTO: 23 % (ref 14–44)
MCH RBC QN AUTO: 30.3 PG (ref 26.8–34.3)
MCHC RBC AUTO-ENTMCNC: 32.1 G/DL (ref 31.4–37.4)
MCV RBC AUTO: 94 FL (ref 82–98)
MONOCYTES # BLD AUTO: 0.51 THOUSAND/ÂΜL (ref 0.17–1.22)
MONOCYTES NFR BLD AUTO: 6 % (ref 4–12)
NEUTROPHILS # BLD AUTO: 5.36 THOUSANDS/ÂΜL (ref 1.85–7.62)
NEUTS SEG NFR BLD AUTO: 67 % (ref 43–75)
NONHDLC SERPL-MCNC: 190 MG/DL
NRBC BLD AUTO-RTO: 0 /100 WBCS
PLATELET # BLD AUTO: 323 THOUSANDS/UL (ref 149–390)
PMV BLD AUTO: 10.2 FL (ref 8.9–12.7)
POTASSIUM SERPL-SCNC: 3.7 MMOL/L (ref 3.5–5.3)
PROT SERPL-MCNC: 6.9 G/DL (ref 6.4–8.4)
RBC # BLD AUTO: 4.16 MILLION/UL (ref 3.81–5.12)
SODIUM SERPL-SCNC: 138 MMOL/L (ref 135–147)
TRIGL SERPL-MCNC: 104 MG/DL
WBC # BLD AUTO: 8 THOUSAND/UL (ref 4.31–10.16)

## 2023-09-22 PROCEDURE — 86803 HEPATITIS C AB TEST: CPT

## 2023-09-22 PROCEDURE — 76856 US EXAM PELVIC COMPLETE: CPT

## 2023-09-22 PROCEDURE — 36415 COLL VENOUS BLD VENIPUNCTURE: CPT

## 2023-09-22 PROCEDURE — 85025 COMPLETE CBC W/AUTO DIFF WBC: CPT

## 2023-09-22 PROCEDURE — 80061 LIPID PANEL: CPT

## 2023-09-22 PROCEDURE — 80053 COMPREHEN METABOLIC PANEL: CPT

## 2023-09-22 PROCEDURE — 87086 URINE CULTURE/COLONY COUNT: CPT

## 2023-09-23 LAB — BACTERIA UR CULT: NORMAL

## 2023-09-27 LAB
LAB AP GYN PRIMARY INTERPRETATION: NORMAL
Lab: NORMAL

## 2023-10-09 ENCOUNTER — OFFICE VISIT (OUTPATIENT)
Dept: GYNECOLOGIC ONCOLOGY | Facility: CLINIC | Age: 68
End: 2023-10-09
Payer: COMMERCIAL

## 2023-10-09 VITALS
HEART RATE: 93 BPM | OXYGEN SATURATION: 99 % | SYSTOLIC BLOOD PRESSURE: 146 MMHG | RESPIRATION RATE: 18 BRPM | BODY MASS INDEX: 22.63 KG/M2 | HEIGHT: 65 IN | TEMPERATURE: 97.7 F | DIASTOLIC BLOOD PRESSURE: 98 MMHG | WEIGHT: 135.8 LBS

## 2023-10-09 DIAGNOSIS — N83.209 CYST OF OVARY, UNSPECIFIED LATERALITY: Primary | ICD-10-CM

## 2023-10-09 DIAGNOSIS — N83.8 OVARIAN MASS, RIGHT: ICD-10-CM

## 2023-10-09 PROCEDURE — 99215 OFFICE O/P EST HI 40 MIN: CPT | Performed by: OBSTETRICS & GYNECOLOGY

## 2023-10-09 RX ORDER — GABAPENTIN 100 MG/1
100 CAPSULE ORAL ONCE
OUTPATIENT
Start: 2023-10-09 | End: 2023-10-09

## 2023-10-09 RX ORDER — ENOXAPARIN SODIUM 100 MG/ML
40 INJECTION SUBCUTANEOUS
OUTPATIENT
Start: 2023-10-09 | End: 2023-10-10

## 2023-10-09 RX ORDER — CEFAZOLIN SODIUM 1 G/50ML
1000 SOLUTION INTRAVENOUS ONCE
OUTPATIENT
Start: 2023-10-09 | End: 2023-10-09

## 2023-10-09 RX ORDER — ACETAMINOPHEN 325 MG/1
975 TABLET ORAL ONCE
OUTPATIENT
Start: 2023-10-09 | End: 2023-10-09

## 2023-10-09 NOTE — LETTER
October 9, 2023     Alirio Garcia, [address]  [address]  [facility] [address]    Patient: Carmen Rick   YOB: 1955   Date of Visit: 10/9/2023       Dear Dr. Guillermo Cruz:    Thank you for referring Carmen Rick to me for evaluation. Below are my notes for this consultation. If you have questions, please do not hesitate to call me. I look forward to following your patient along with you. Sincerely,        Malgorzata Coon MD        CC: No Recipients    Malgorzata Coon MD  10/9/2023 10:13 AM  Incomplete  Assessment/Plan:    Problem List Items Addressed This Visit          Other    Ovarian mass, right     The patient has been recently diagnosed with a ovarian mass. We discussed with the patient that all new complex ovarian masses in the postmenopausal phase run approximately a 4% risk of malignancy. We discussed that 50% of these resolve spontaneously. Those masses that remained around approximately 8% risk of malignancy. We have discussed treatment options including observation with follow-up in 2 months including possible ultrasound at that time, or immediate surgery. We have recommended the following:    Robotically assisted total laparoscopic hysterectomy bilateral salpingo-oophorectomy with possible pelvic and para-aortic lymph node dissection staging biopsies including omentectomy based on findings at frozen section. .  Have discussed risks and benefits of the procedure including bleeding requiring transfusion infection, infection, damage to local structures including bowel bladder ureter and other local organs. We discussed the risk of deep venous thrombosis. An open procedure may be required. All of these complications are in the 2-4% range of likelihood. The patient understands the risks and benefits of the procedure and has signed an informed consent. I personally signed the consent form with her.   She does understand that further treatment including chemotherapy radiation therapy or hormones may be required based on the final postoperative pathologic diagnosis and staging. Standard preoperative testing including type and screen is CBC CPM P chest x-ray and EKG will be ordered. Any appropriate consultations for preoperative evaluation will also be ordered. Overall consultation took 30 min with greater than 50% in dedicated toward discussion time. Other Visit Diagnoses       Cyst of ovary, unspecified laterality    -  Primary    Relevant Orders    Case request operating room: HYSTERECTOMY LAPAROSCOPIC TOTAL ([address] [place] [address]) W/ ROBOTICS bilateral salpingo-oophorectomy possible staging possible open (Completed)    Type and screen    Comprehensive metabolic panel    CBC and differential    HEMOGLOBIN A1C W/ EAG ESTIMATION    EKG 12 lead    XR chest pa & lateral                CHIEF COMPLAINT: Persistent adnexal mass          Patient ID: Jacqueline Zuniga is a 80 y.o. female for evaluation and management of adnexal mass       Patient had developed some thigh pain and underwent evaluation including a pelvic ultrasound which revealed a approximately 10 cm adnexal mass. Recommendation for surgery was given. The patient needed some time to think about this and preferred a repeat ultrasound. This reveals the following:    UTERUS:  The uterus is anteverted in position, measuring 11.0 x 3.6 x 4.8 cm. Heterogeneous echotexture with several fibroids, some calcified. Example right fibroid measures 3.2 x 3.7 x 3.4 cm. Left fibroid measures 3 x 3 x 3 cm. The cervix appears within normal limits. ENDOMETRIUM:  The endometrial echo complex has an AP caliber of 3 mm. There is a small amount of endometrial fluid. Previously visualized focal endometrial thickening is not as well demonstrated on this transabdominal exam.     OVARIES/ADNEXA:  Right ovary:  12.1 x 10.1 x 6.2 cm. 396.1 mL. Left ovary:  2.2 x 1.5 x 1.7 cm. 3.1 mL.   Ovarian Doppler flow is within normal limits. Right ovarian cyst again visualized measuring 10.5 x 6.6 x 7.8 cm. This is similar or slightly increased in size since the prior exam, which was measured transvaginally. OTHER:  No free fluid or loculated fluid collections. IMPRESSION:     1. Heterogeneous uterus with calcified fibroids. 2. Previously visualized focal endometrial thickening is not as well demonstrated on this limited transabdominal exam.  3. Right ovarian cyst appears similar or slightly increased in size given variation in measuring technique compared to prior transvaginal exam.  O-RADS 3 = Low risk Management by gynecologist. MRI can be considered. The patient continues to have right medial thigh pain. She has no intra-abdominal concerns. She has no other issues. .Today, the patient is doing well. She denies significant abdominal pain, pelvic pain, nausea, vomiting, constipation, diarrhea, fevers, chills, or vaginal bleeding. Review of Systems   Constitutional: Negative. Right medial thigh pain   HENT: Negative. Eyes: Negative. Respiratory: Negative. Cardiovascular: Negative. Gastrointestinal: Negative. Endocrine: Negative. Genitourinary: Negative. Musculoskeletal: Negative. Skin: Negative. Neurological: Negative. Hematological: Negative. Psychiatric/Behavioral: Negative. Current Outpatient Medications   Medication Sig Dispense Refill   • Ascorbic Acid (VITAMIN C PO) Take by mouth     • lisinopril (ZESTRIL) 20 mg tablet Take 1 tablet (20 mg total) by mouth daily 90 tablet 3   • Multiple Vitamins-Minerals (ZINC PO) Take by mouth     • VITAMIN D PO Take by mouth       No current facility-administered medications for this visit.        Allergies   Allergen Reactions   • Sulfa Antibiotics Hives     Palpitations of heart   • Macrobid [Nitrofurantoin] Hives       Past Medical History:   Diagnosis Date   • Allergic     seasonal       Past Surgical History:   Procedure Laterality Date   • COLONOSCOPY         OB History    No obstetric history on file. Family History   Problem Relation Age of Onset   • No Known Problems Mother    • Hypertension Father        The following portions of the patient's history were reviewed and updated as appropriate: allergies, current medications, past family history, past social history, past surgical history and problem list.      Objective:    Blood pressure 146/98, pulse 93, temperature 97.7 °F (36.5 °C), temperature source Temporal, resp. rate 18, height 5' 5" (1.651 m), weight 61.6 kg (135 lb 12.8 oz), SpO2 99 %. Body mass index is 22.6 kg/m². Physical Exam  Constitutional:       Appearance: She is well-developed. HENT:      Head: Normocephalic and atraumatic. Eyes:      Pupils: Pupils are equal, round, and reactive to light. Cardiovascular:      Rate and Rhythm: Normal rate and regular rhythm. Heart sounds: Normal heart sounds. Pulmonary:      Effort: Pulmonary effort is normal. No respiratory distress. Breath sounds: Normal breath sounds. Abdominal:      General: Bowel sounds are normal. There is no distension. Palpations: Abdomen is soft. Abdomen is not rigid. Tenderness: There is no abdominal tenderness. There is no guarding or rebound. Genitourinary:     Comments: Deferred  Musculoskeletal:         General: Normal range of motion. Cervical back: Normal range of motion and neck supple. Lymphadenopathy:      Cervical: No cervical adenopathy. Upper Body:      Right upper body: No supraclavicular adenopathy. Left upper body: No supraclavicular adenopathy. Skin:     General: Skin is warm and dry. Neurological:      Mental Status: She is alert and oriented to person, place, and time.    Psychiatric:         Behavior: Behavior normal.           No results found for: ""  Lab Results   Component Value Date    WBC 8.00 09/22/2023    HGB 12.6 09/22/2023    HCT 39.2 09/22/2023    MCV 94 09/22/2023     09/22/2023     Lab Results   Component Value Date    K 3.7 09/22/2023     09/22/2023    CO2 26 09/22/2023    BUN 20 09/22/2023    CREATININE 0.79 09/22/2023    GLUF 87 09/22/2023    CALCIUM 8.9 09/22/2023    AST 14 09/22/2023    ALT 9 09/22/2023    ALKPHOS 42 09/22/2023    EGFR 77 09/22/2023        Trend:  No results found for: ""

## 2023-10-09 NOTE — PROGRESS NOTES
Assessment/Plan:    Problem List Items Addressed This Visit        Other    Ovarian mass, right     The patient has been recently diagnosed with a ovarian mass. We discussed with the patient that all new complex ovarian masses in the postmenopausal phase run approximately a 4% risk of malignancy. We discussed that 50% of these resolve spontaneously. Those masses that remained around approximately 8% risk of malignancy. We have discussed treatment options including observation with follow-up in 2 months including possible ultrasound at that time, or immediate surgery. We have recommended the following:    Robotically assisted total laparoscopic hysterectomy bilateral salpingo-oophorectomy with possible pelvic and para-aortic lymph node dissection staging biopsies including omentectomy based on findings at frozen section. .  Have discussed risks and benefits of the procedure including bleeding requiring transfusion infection, infection, damage to local structures including bowel bladder ureter and other local organs. We discussed the risk of deep venous thrombosis. An open procedure may be required. All of these complications are in the 2-4% range of likelihood. The patient understands the risks and benefits of the procedure and has signed an informed consent. I personally signed the consent form with her. She does understand that further treatment including chemotherapy radiation therapy or hormones may be required based on the final postoperative pathologic diagnosis and staging. Standard preoperative testing including type and screen is CBC CPM P chest x-ray and EKG will be ordered. Any appropriate consultations for preoperative evaluation will also be ordered. Overall consultation took 30 min with greater than 50% in dedicated toward discussion time.         Other Visit Diagnoses     Cyst of ovary, unspecified laterality    -  Primary    Relevant Orders    Case request operating room: HYSTERECTOMY LAPAROSCOPIC TOTAL ([address] [facility]) W/ ROBOTICS bilateral salpingo-oophorectomy possible staging possible open (Completed)    Type and screen    Comprehensive metabolic panel    CBC and differential    HEMOGLOBIN A1C W/ EAG ESTIMATION    EKG 12 lead    XR chest pa & lateral              CHIEF COMPLAINT: Persistent adnexal mass          Patient ID: Abdirahman Platt is a 80 y.o. female for evaluation and management of adnexal mass       Patient had developed some thigh pain and underwent evaluation including a pelvic ultrasound which revealed a approximately 10 cm adnexal mass. Recommendation for surgery was given. The patient needed some time to think about this and preferred a repeat ultrasound. This reveals the following:    UTERUS:  The uterus is anteverted in position, measuring 11.0 x 3.6 x 4.8 cm. Heterogeneous echotexture with several fibroids, some calcified. Example right fibroid measures 3.2 x 3.7 x 3.4 cm. Left fibroid measures 3 x 3 x 3 cm. The cervix appears within normal limits.     ENDOMETRIUM:  The endometrial echo complex has an AP caliber of 3 mm. There is a small amount of endometrial fluid. Previously visualized focal endometrial thickening is not as well demonstrated on this transabdominal exam.     OVARIES/ADNEXA:  Right ovary:  12.1 x 10.1 x 6.2 cm. 396.1 mL. Left ovary:  2.2 x 1.5 x 1.7 cm. 3.1 mL. Ovarian Doppler flow is within normal limits. Right ovarian cyst again visualized measuring 10.5 x 6.6 x 7.8 cm. This is similar or slightly increased in size since the prior exam, which was measured transvaginally.     OTHER:  No free fluid or loculated fluid collections.        IMPRESSION:     1. Heterogeneous uterus with calcified fibroids.   2. Previously visualized focal endometrial thickening is not as well demonstrated on this limited transabdominal exam.  3. Right ovarian cyst appears similar or slightly increased in size given variation in measuring technique compared to prior transvaginal exam.  O-RADS 3 = Low risk Management by gynecologist. MRI can be considered. The patient continues to have right medial thigh pain. She has no intra-abdominal concerns. She has no other issues. .Today, the patient is doing well. She denies significant abdominal pain, pelvic pain, nausea, vomiting, constipation, diarrhea, fevers, chills, or vaginal bleeding. Review of Systems   Constitutional: Negative. Right medial thigh pain   HENT: Negative. Eyes: Negative. Respiratory: Negative. Cardiovascular: Negative. Gastrointestinal: Negative. Endocrine: Negative. Genitourinary: Negative. Musculoskeletal: Negative. Skin: Negative. Neurological: Negative. Hematological: Negative. Psychiatric/Behavioral: Negative. Current Outpatient Medications   Medication Sig Dispense Refill   • Ascorbic Acid (VITAMIN C PO) Take by mouth     • lisinopril (ZESTRIL) 20 mg tablet Take 1 tablet (20 mg total) by mouth daily 90 tablet 3   • Multiple Vitamins-Minerals (ZINC PO) Take by mouth     • VITAMIN D PO Take by mouth       No current facility-administered medications for this visit. Allergies   Allergen Reactions   • Sulfa Antibiotics Hives     Palpitations of heart   • Macrobid [Nitrofurantoin] Hives       Past Medical History:   Diagnosis Date   • Allergic     seasonal       Past Surgical History:   Procedure Laterality Date   • COLONOSCOPY         OB History    No obstetric history on file. Family History   Problem Relation Age of Onset   • No Known Problems Mother    • Hypertension Father        The following portions of the patient's history were reviewed and updated as appropriate: allergies, current medications, past family history, past social history, past surgical history and problem list.      Objective:    Blood pressure 146/98, pulse 93, temperature 97.7 °F (36.5 °C), temperature source Temporal, resp.  rate 18, height 5' 5" (1.651 m), weight 61.6 kg (135 lb 12.8 oz), SpO2 99 %. Body mass index is 22.6 kg/m². Physical Exam  Constitutional:       Appearance: She is well-developed. HENT:      Head: Normocephalic and atraumatic. Eyes:      Pupils: Pupils are equal, round, and reactive to light. Cardiovascular:      Rate and Rhythm: Normal rate and regular rhythm. Heart sounds: Normal heart sounds. Pulmonary:      Effort: Pulmonary effort is normal. No respiratory distress. Breath sounds: Normal breath sounds. Abdominal:      General: Bowel sounds are normal. There is no distension. Palpations: Abdomen is soft. Abdomen is not rigid. Tenderness: There is no abdominal tenderness. There is no guarding or rebound. Genitourinary:     Comments: Deferred  Musculoskeletal:         General: Normal range of motion. Cervical back: Normal range of motion and neck supple. Lymphadenopathy:      Cervical: No cervical adenopathy. Upper Body:      Right upper body: No supraclavicular adenopathy. Left upper body: No supraclavicular adenopathy. Skin:     General: Skin is warm and dry. Neurological:      Mental Status: She is alert and oriented to person, place, and time.    Psychiatric:         Behavior: Behavior normal.           No results found for: ""  Lab Results   Component Value Date    WBC 8.00 09/22/2023    HGB 12.6 09/22/2023    HCT 39.2 09/22/2023    MCV 94 09/22/2023     09/22/2023     Lab Results   Component Value Date    K 3.7 09/22/2023     09/22/2023    CO2 26 09/22/2023    BUN 20 09/22/2023    CREATININE 0.79 09/22/2023    GLUF 87 09/22/2023    CALCIUM 8.9 09/22/2023    AST 14 09/22/2023    ALT 9 09/22/2023    ALKPHOS 42 09/22/2023    EGFR 77 09/22/2023        Trend:  No results found for: ""

## 2023-10-09 NOTE — ASSESSMENT & PLAN NOTE
The patient has been recently diagnosed with a ovarian mass. We discussed with the patient that all new complex ovarian masses in the postmenopausal phase run approximately a 4% risk of malignancy. We discussed that 50% of these resolve spontaneously. Those masses that remained around approximately 8% risk of malignancy. We have discussed treatment options including observation with follow-up in 2 months including possible ultrasound at that time, or immediate surgery. We have recommended the following:    Robotically assisted total laparoscopic hysterectomy bilateral salpingo-oophorectomy with possible pelvic and para-aortic lymph node dissection staging biopsies including omentectomy based on findings at frozen section. .  Have discussed risks and benefits of the procedure including bleeding requiring transfusion infection, infection, damage to local structures including bowel bladder ureter and other local organs. We discussed the risk of deep venous thrombosis. An open procedure may be required. All of these complications are in the 2-4% range of likelihood. The patient understands the risks and benefits of the procedure and has signed an informed consent. I personally signed the consent form with her. She does understand that further treatment including chemotherapy radiation therapy or hormones may be required based on the final postoperative pathologic diagnosis and staging. Standard preoperative testing including type and screen is CBC CPM P chest x-ray and EKG will be ordered. Any appropriate consultations for preoperative evaluation will also be ordered. Overall consultation took 30 min with greater than 50% in dedicated toward discussion time.

## 2023-10-09 NOTE — H&P (VIEW-ONLY)
Assessment/Plan:    Problem List Items Addressed This Visit        Other    Ovarian mass, right     The patient has been recently diagnosed with a ovarian mass. We discussed with the patient that all new complex ovarian masses in the postmenopausal phase run approximately a 4% risk of malignancy. We discussed that 50% of these resolve spontaneously. Those masses that remained around approximately 8% risk of malignancy. We have discussed treatment options including observation with follow-up in 2 months including possible ultrasound at that time, or immediate surgery. We have recommended the following:    Robotically assisted total laparoscopic hysterectomy bilateral salpingo-oophorectomy with possible pelvic and para-aortic lymph node dissection staging biopsies including omentectomy based on findings at frozen section. .  Have discussed risks and benefits of the procedure including bleeding requiring transfusion infection, infection, damage to local structures including bowel bladder ureter and other local organs. We discussed the risk of deep venous thrombosis. An open procedure may be required. All of these complications are in the 2-4% range of likelihood. The patient understands the risks and benefits of the procedure and has signed an informed consent. I personally signed the consent form with her. She does understand that further treatment including chemotherapy radiation therapy or hormones may be required based on the final postoperative pathologic diagnosis and staging. Standard preoperative testing including type and screen is CBC CPM P chest x-ray and EKG will be ordered. Any appropriate consultations for preoperative evaluation will also be ordered. Overall consultation took 30 min with greater than 50% in dedicated toward discussion time.         Other Visit Diagnoses     Cyst of ovary, unspecified laterality    -  Primary    Relevant Orders    Case request operating room: HYSTERECTOMY LAPAROSCOPIC TOTAL ([facility]) W/ ROBOTICS bilateral salpingo-oophorectomy possible staging possible open (Completed)    Type and screen    Comprehensive metabolic panel    CBC and differential    HEMOGLOBIN A1C W/ EAG ESTIMATION    EKG 12 lead    XR chest pa & lateral              CHIEF COMPLAINT: Persistent adnexal mass          Patient ID: Judson Echevarria is a 80 y.o. female for evaluation and management of adnexal mass       Patient had developed some thigh pain and underwent evaluation including a pelvic ultrasound which revealed a approximately 10 cm adnexal mass. Recommendation for surgery was given. The patient needed some time to think about this and preferred a repeat ultrasound. This reveals the following:    UTERUS:  The uterus is anteverted in position, measuring 11.0 x 3.6 x 4.8 cm. Heterogeneous echotexture with several fibroids, some calcified. Example right fibroid measures 3.2 x 3.7 x 3.4 cm. Left fibroid measures 3 x 3 x 3 cm. The cervix appears within normal limits.     ENDOMETRIUM:  The endometrial echo complex has an AP caliber of 3 mm. There is a small amount of endometrial fluid. Previously visualized focal endometrial thickening is not as well demonstrated on this transabdominal exam.     OVARIES/ADNEXA:  Right ovary:  12.1 x 10.1 x 6.2 cm. 396.1 mL. Left ovary:  2.2 x 1.5 x 1.7 cm. 3.1 mL. Ovarian Doppler flow is within normal limits. Right ovarian cyst again visualized measuring 10.5 x 6.6 x 7.8 cm. This is similar or slightly increased in size since the prior exam, which was measured transvaginally.     OTHER:  No free fluid or loculated fluid collections.        IMPRESSION:     1. Heterogeneous uterus with calcified fibroids.   2. Previously visualized focal endometrial thickening is not as well demonstrated on this limited transabdominal exam.  3. Right ovarian cyst appears similar or slightly increased in size given variation in measuring technique compared to prior transvaginal exam.  O-RADS 3 = Low risk Management by gynecologist. MRI can be considered. The patient continues to have right medial thigh pain. She has no intra-abdominal concerns. She has no other issues. .Today, the patient is doing well. She denies significant abdominal pain, pelvic pain, nausea, vomiting, constipation, diarrhea, fevers, chills, or vaginal bleeding. Review of Systems   Constitutional: Negative. Right medial thigh pain   HENT: Negative. Eyes: Negative. Respiratory: Negative. Cardiovascular: Negative. Gastrointestinal: Negative. Endocrine: Negative. Genitourinary: Negative. Musculoskeletal: Negative. Skin: Negative. Neurological: Negative. Hematological: Negative. Psychiatric/Behavioral: Negative. Current Outpatient Medications   Medication Sig Dispense Refill   • Ascorbic Acid (VITAMIN C PO) Take by mouth     • lisinopril (ZESTRIL) 20 mg tablet Take 1 tablet (20 mg total) by mouth daily 90 tablet 3   • Multiple Vitamins-Minerals (ZINC PO) Take by mouth     • VITAMIN D PO Take by mouth       No current facility-administered medications for this visit. Allergies   Allergen Reactions   • Sulfa Antibiotics Hives     Palpitations of heart   • Macrobid [Nitrofurantoin] Hives       Past Medical History:   Diagnosis Date   • Allergic     seasonal       Past Surgical History:   Procedure Laterality Date   • COLONOSCOPY         OB History    No obstetric history on file. Family History   Problem Relation Age of Onset   • No Known Problems Mother    • Hypertension Father        The following portions of the patient's history were reviewed and updated as appropriate: allergies, current medications, past family history, past social history, past surgical history and problem list.      Objective:    Blood pressure 146/98, pulse 93, temperature 97.7 °F (36.5 °C), temperature source Temporal, resp.  rate 18, height 5' 5" (1.651 m), weight 61.6 kg (135 lb 12.8 oz), SpO2 99 %. Body mass index is 22.6 kg/m². Physical Exam  Constitutional:       Appearance: She is well-developed. HENT:      Head: Normocephalic and atraumatic. Eyes:      Pupils: Pupils are equal, round, and reactive to light. Cardiovascular:      Rate and Rhythm: Normal rate and regular rhythm. Heart sounds: Normal heart sounds. Pulmonary:      Effort: Pulmonary effort is normal. No respiratory distress. Breath sounds: Normal breath sounds. Abdominal:      General: Bowel sounds are normal. There is no distension. Palpations: Abdomen is soft. Abdomen is not rigid. Tenderness: There is no abdominal tenderness. There is no guarding or rebound. Genitourinary:     Comments: Deferred  Musculoskeletal:         General: Normal range of motion. Cervical back: Normal range of motion and neck supple. Lymphadenopathy:      Cervical: No cervical adenopathy. Upper Body:      Right upper body: No supraclavicular adenopathy. Left upper body: No supraclavicular adenopathy. Skin:     General: Skin is warm and dry. Neurological:      Mental Status: She is alert and oriented to person, place, and time.    Psychiatric:         Behavior: Behavior normal.           No results found for: ""  Lab Results   Component Value Date    WBC 8.00 09/22/2023    HGB 12.6 09/22/2023    HCT 39.2 09/22/2023    MCV 94 09/22/2023     09/22/2023     Lab Results   Component Value Date    K 3.7 09/22/2023     09/22/2023    CO2 26 09/22/2023    BUN 20 09/22/2023    CREATININE 0.79 09/22/2023    GLUF 87 09/22/2023    CALCIUM 8.9 09/22/2023    AST 14 09/22/2023    ALT 9 09/22/2023    ALKPHOS 42 09/22/2023    EGFR 77 09/22/2023        Trend:  No results found for: ""

## 2023-10-19 ENCOUNTER — APPOINTMENT (OUTPATIENT)
Dept: LAB | Facility: CLINIC | Age: 68
End: 2023-10-19
Payer: COMMERCIAL

## 2023-10-19 DIAGNOSIS — N83.209 CYST OF OVARY, UNSPECIFIED LATERALITY: Primary | ICD-10-CM

## 2023-10-19 LAB
ALBUMIN SERPL BCP-MCNC: 4 G/DL (ref 3.5–5)
ALP SERPL-CCNC: 43 U/L (ref 34–104)
ALT SERPL W P-5'-P-CCNC: 9 U/L (ref 7–52)
ANION GAP SERPL CALCULATED.3IONS-SCNC: 1 MMOL/L
AST SERPL W P-5'-P-CCNC: 14 U/L (ref 13–39)
BASOPHILS # BLD AUTO: 0.05 THOUSANDS/ÂΜL (ref 0–0.1)
BASOPHILS NFR BLD AUTO: 1 % (ref 0–1)
BILIRUB SERPL-MCNC: 0.78 MG/DL (ref 0.2–1)
BUN SERPL-MCNC: 18 MG/DL (ref 5–25)
CALCIUM SERPL-MCNC: 9 MG/DL (ref 8.4–10.2)
CHLORIDE SERPL-SCNC: 106 MMOL/L (ref 96–108)
CO2 SERPL-SCNC: 32 MMOL/L (ref 21–32)
CREAT SERPL-MCNC: 0.75 MG/DL (ref 0.6–1.3)
EOSINOPHIL # BLD AUTO: 0.3 THOUSAND/ÂΜL (ref 0–0.61)
EOSINOPHIL NFR BLD AUTO: 4 % (ref 0–6)
ERYTHROCYTE [DISTWIDTH] IN BLOOD BY AUTOMATED COUNT: 13.1 % (ref 11.6–15.1)
GFR SERPL CREATININE-BSD FRML MDRD: 82 ML/MIN/1.73SQ M
GLUCOSE P FAST SERPL-MCNC: 86 MG/DL (ref 65–99)
HCT VFR BLD AUTO: 38.1 % (ref 34.8–46.1)
HGB BLD-MCNC: 12.7 G/DL (ref 11.5–15.4)
IMM GRANULOCYTES # BLD AUTO: 0.02 THOUSAND/UL (ref 0–0.2)
IMM GRANULOCYTES NFR BLD AUTO: 0 % (ref 0–2)
LYMPHOCYTES # BLD AUTO: 1.88 THOUSANDS/ÂΜL (ref 0.6–4.47)
LYMPHOCYTES NFR BLD AUTO: 24 % (ref 14–44)
MCH RBC QN AUTO: 31.1 PG (ref 26.8–34.3)
MCHC RBC AUTO-ENTMCNC: 33.3 G/DL (ref 31.4–37.4)
MCV RBC AUTO: 93 FL (ref 82–98)
MONOCYTES # BLD AUTO: 0.53 THOUSAND/ÂΜL (ref 0.17–1.22)
MONOCYTES NFR BLD AUTO: 7 % (ref 4–12)
NEUTROPHILS # BLD AUTO: 5.02 THOUSANDS/ÂΜL (ref 1.85–7.62)
NEUTS SEG NFR BLD AUTO: 64 % (ref 43–75)
NRBC BLD AUTO-RTO: 0 /100 WBCS
PLATELET # BLD AUTO: 297 THOUSANDS/UL (ref 149–390)
PMV BLD AUTO: 10.1 FL (ref 8.9–12.7)
POTASSIUM SERPL-SCNC: 4 MMOL/L (ref 3.5–5.3)
PROT SERPL-MCNC: 6.7 G/DL (ref 6.4–8.4)
RBC # BLD AUTO: 4.08 MILLION/UL (ref 3.81–5.12)
SODIUM SERPL-SCNC: 139 MMOL/L (ref 135–147)
WBC # BLD AUTO: 7.8 THOUSAND/UL (ref 4.31–10.16)

## 2023-10-19 PROCEDURE — 86901 BLOOD TYPING SEROLOGIC RH(D): CPT | Performed by: OBSTETRICS & GYNECOLOGY

## 2023-10-19 PROCEDURE — 36415 COLL VENOUS BLD VENIPUNCTURE: CPT

## 2023-10-19 PROCEDURE — 85025 COMPLETE CBC W/AUTO DIFF WBC: CPT

## 2023-10-19 PROCEDURE — 86900 BLOOD TYPING SEROLOGIC ABO: CPT | Performed by: OBSTETRICS & GYNECOLOGY

## 2023-10-19 PROCEDURE — 86850 RBC ANTIBODY SCREEN: CPT | Performed by: OBSTETRICS & GYNECOLOGY

## 2023-10-19 PROCEDURE — 80053 COMPREHEN METABOLIC PANEL: CPT

## 2023-10-19 PROCEDURE — 83036 HEMOGLOBIN GLYCOSYLATED A1C: CPT

## 2023-10-20 ENCOUNTER — LAB REQUISITION (OUTPATIENT)
Dept: LAB | Facility: HOSPITAL | Age: 68
End: 2023-10-20
Payer: COMMERCIAL

## 2023-10-20 DIAGNOSIS — N83.209 UNSPECIFIED OVARIAN CYST, UNSPECIFIED SIDE: ICD-10-CM

## 2023-10-20 LAB
ABO GROUP BLD: NORMAL
BLD GP AB SCN SERPL QL: NEGATIVE
EST. AVERAGE GLUCOSE BLD GHB EST-MCNC: 120 MG/DL
HBA1C MFR BLD: 5.8 %
RH BLD: NEGATIVE
SPECIMEN EXPIRATION DATE: NORMAL

## 2023-10-23 ENCOUNTER — TELEPHONE (OUTPATIENT)
Dept: FAMILY MEDICINE CLINIC | Facility: CLINIC | Age: 68
End: 2023-10-23

## 2023-10-23 ENCOUNTER — OFFICE VISIT (OUTPATIENT)
Dept: LAB | Facility: HOSPITAL | Age: 68
End: 2023-10-23
Payer: COMMERCIAL

## 2023-10-23 ENCOUNTER — HOSPITAL ENCOUNTER (OUTPATIENT)
Dept: RADIOLOGY | Facility: HOSPITAL | Age: 68
Discharge: HOME | End: 2023-10-23
Payer: COMMERCIAL

## 2023-10-23 ENCOUNTER — ANESTHESIA EVENT (OUTPATIENT)
Dept: PERIOP | Facility: HOSPITAL | Age: 68
End: 2023-10-23
Payer: COMMERCIAL

## 2023-10-23 ENCOUNTER — APPOINTMENT (OUTPATIENT)
Dept: LAB | Facility: CLINIC | Age: 68
End: 2023-10-23
Payer: COMMERCIAL

## 2023-10-23 DIAGNOSIS — N83.8 OVARIAN MASS, RIGHT: ICD-10-CM

## 2023-10-23 DIAGNOSIS — N83.209 CYST OF OVARY, UNSPECIFIED LATERALITY: ICD-10-CM

## 2023-10-23 DIAGNOSIS — N83.8 OVARIAN MASS, RIGHT: Primary | ICD-10-CM

## 2023-10-23 LAB
BILIRUB UR QL STRIP: NEGATIVE
CLARITY UR: CLEAR
COLOR UR: COLORLESS
GLUCOSE UR STRIP-MCNC: NEGATIVE MG/DL
HGB UR QL STRIP.AUTO: NEGATIVE
KETONES UR STRIP-MCNC: NEGATIVE MG/DL
LEUKOCYTE ESTERASE UR QL STRIP: NEGATIVE
NITRITE UR QL STRIP: NEGATIVE
PH UR STRIP.AUTO: 6.5 [PH]
PROT UR STRIP-MCNC: NEGATIVE MG/DL
SP GR UR STRIP.AUTO: 1 (ref 1–1.03)
UROBILINOGEN UR STRIP-ACNC: <2 MG/DL

## 2023-10-23 PROCEDURE — 93005 ELECTROCARDIOGRAM TRACING: CPT

## 2023-10-23 PROCEDURE — 71046 X-RAY EXAM CHEST 2 VIEWS: CPT

## 2023-10-23 PROCEDURE — 81003 URINALYSIS AUTO W/O SCOPE: CPT

## 2023-10-23 NOTE — TELEPHONE ENCOUNTER
Patient has appointment for a medical clearance on Friday. She had testing completed but is asking if you would order a UA for her since she has UTI frequently.

## 2023-10-24 LAB
ATRIAL RATE: 73 BPM
P AXIS: 74 DEGREES
PR INTERVAL: 154 MS
QRS AXIS: 58 DEGREES
QRSD INTERVAL: 92 MS
QT INTERVAL: 392 MS
QTC INTERVAL: 431 MS
T WAVE AXIS: 50 DEGREES
VENTRICULAR RATE: 73 BPM

## 2023-10-24 PROCEDURE — 93010 ELECTROCARDIOGRAM REPORT: CPT | Performed by: INTERNAL MEDICINE

## 2023-10-25 ENCOUNTER — HOSPITAL ENCOUNTER (OUTPATIENT)
Dept: NON INVASIVE DIAGNOSTICS | Facility: HOSPITAL | Age: 68
Discharge: HOME | End: 2023-10-25
Attending: SURGERY
Payer: COMMERCIAL

## 2023-10-25 DIAGNOSIS — I72.2 RENAL ARTERY ANEURYSM (HCC): ICD-10-CM

## 2023-10-25 PROCEDURE — 93975 VASCULAR STUDY: CPT

## 2023-10-25 PROCEDURE — 93975 VASCULAR STUDY: CPT | Performed by: SURGERY

## 2023-10-25 NOTE — PRE-PROCEDURE INSTRUCTIONS
[facility]  Part of [place] [address] [place] [address]    May 25, 2022    Dear Pharmacist,    Your customer, Nadya Flanagan, born on 1949, was recently discharged from [facility].  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)      Dose: 5-10 mg  Take 0.25-0.5 mLs (5-10 mg) by mouth every hour as needed for moderate to severe pain (dyspnea)  Quantity: 30 mL  Refills: 0     * morphine sulfate 20 mg/mL (HIGH CONC) soln  Commonly known as: ROXANOL      Dose: 5-10 mg  Place 0.25-0.5 mLs (5-10 mg) under the tongue every hour as needed for breakthrough pain  Refills: 0     prochlorperazine 25 MG suppository  Commonly known as: COMPAZINE  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)      Dose: 12.5 mg  Place 0.5 suppositories (12.5 mg) rectally every 12 hours as needed for nausea or vomiting  Quantity: 30 suppository  Refills: 0     prochlorperazine 5 MG tablet  Commonly known as: COMPAZINE      Dose: 5 mg  Take 1 tablet (5 mg) by mouth every 6 hours as needed for vomiting  Refills: 0     scopolamine 1 MG/3DAYS 72 hr patch  Commonly known as: TRANSDERM  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 Read the directions carefully, and ask your doctor or other care provider to review them with you.            CONTINUE these medicines which have NOT CHANGED      Dose / Directions   bisacodyl 10 MG suppository  Commonly known as: DULCOLAX  Used for: Drug-induced constipation      Dose: 10 mg  Place 1  Pre-Surgery Instructions:   Medication Instructions    Ascorbic Acid (VITAMIN C PO) Stop taking 7 days prior to surgery. lisinopril (ZESTRIL) 20 mg tablet Hold day of surgery. VITAMIN D PO Stop taking 7 days prior to surgery. Medication instructions for day surgery reviewed. Please use only a sip of water to take your instructed medications. Avoid all over the counter vitamins, supplements and NSAIDS for one week prior to surgery per anesthesia guidelines. Tylenol is ok to take as needed. You will receive a call one business day prior to surgery with an arrival time and hospital directions. If your surgery is scheduled on a Monday, the hospital will be calling you on the Friday prior to your surgery. If you have not heard from anyone by 8pm, please call the hospital supervisor through the hospital  at [phone]. Shayla Every [phone]). Do not eat anything after midnight the night before your surgery, including candy, mints, lifesavers, or chewing gum. Pt has carb drinks x 3 and understands timing of consumption. Do not drink alcohol 24hrs before your surgery. Try not to smoke at least 24hrs before your surgery. Follow the pre surgery showering instructions as listed in the [facility] Surgical Experience Booklet” or otherwise provided by your surgeon's office. Do not shave the surgical area 24 hours before surgery. Do not apply any lotions, creams, including makeup, cologne, deodorant, or perfumes after showering on the day of your surgery. No contact lenses, eye make-up, or artificial eyelashes. Remove nail polish, including gel polish, and any artificial, gel, or acrylic nails if possible. Remove all jewelry including rings and body piercing jewelry. Wear causal clothing that is easy to take on and off. Consider your type of surgery. Keep any valuables, jewelry, piercings at home. Please bring any specially ordered equipment (sling, braces) if indicated.     Arrange for a responsible person to drive you to and from the hospital on the day of your surgery. Visitor Guidelines discussed. Call the surgeon's office with any new illnesses, exposures, or additional questions prior to surgery. Please reference your [facility] Surgical Experience Booklet” for additional information to prepare for your upcoming surgery. suppository (10 mg) rectally daily as needed for constipation  Quantity: 16 suppository  Refills: 1     Florajen Acidophilus Caps      Refills: 0     levothyroxine 25 MCG tablet  Commonly known as: SYNTHROID/LEVOTHROID  Used for: Hypothyroidism, unspecified type      Dose: 25 mcg  Take 1 tablet (25 mcg) by mouth in the morning.  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 - [place], [place] - [address]  [address] [facility] [address]    Phone: [phone]     morphine sulfate 20 mg/mL (HIGH CONC) soln    prochlorperazine 25 MG suppository    scopolamine 1 MG/3DAYS 72 hr patch         We also reviewed Nadya Flanagan's allergy list and updated it as needed:  Allergies: Metrizamide, Lisinopril, Bee venom, Pollen extract, and Sulfa drugs    Thank you for continuing to care for Nadya Flanagan.  We look forward to  working together with you in the future.    Sincerely,  Emma Phillip RPH  [facility]

## 2023-10-27 ENCOUNTER — OFFICE VISIT (OUTPATIENT)
Dept: FAMILY MEDICINE CLINIC | Facility: CLINIC | Age: 68
End: 2023-10-27
Payer: COMMERCIAL

## 2023-10-27 VITALS
HEIGHT: 65 IN | DIASTOLIC BLOOD PRESSURE: 88 MMHG | HEART RATE: 106 BPM | OXYGEN SATURATION: 98 % | BODY MASS INDEX: 22.39 KG/M2 | WEIGHT: 134.4 LBS | TEMPERATURE: 98.3 F | SYSTOLIC BLOOD PRESSURE: 140 MMHG

## 2023-10-27 DIAGNOSIS — I10 ESSENTIAL HYPERTENSION, BENIGN: ICD-10-CM

## 2023-10-27 DIAGNOSIS — M16.11 PRIMARY OSTEOARTHRITIS OF RIGHT HIP: ICD-10-CM

## 2023-10-27 DIAGNOSIS — N83.8 OVARIAN MASS, RIGHT: ICD-10-CM

## 2023-10-27 DIAGNOSIS — I10 BENIGN HYPERTENSION: Primary | ICD-10-CM

## 2023-10-27 PROCEDURE — 99214 OFFICE O/P EST MOD 30 MIN: CPT | Performed by: FAMILY MEDICINE

## 2023-10-27 RX ORDER — VALSARTAN 320 MG/1
320 TABLET ORAL DAILY
Qty: 90 TABLET | Refills: 3 | Status: SHIPPED | OUTPATIENT
Start: 2023-10-27

## 2023-10-27 NOTE — ASSESSMENT & PLAN NOTE
Patient has plaints of right groin pain. This is consistent with osteoarthritis of the right hip. I am quite certain that this is the diagnosis. For now, patient should take extra strength Tylenol as needed for her symptoms. I told the patient when she recovers from her hysterectomy, we will get an x-ray of the right hip to assess the severity of the osteoarthritis.

## 2023-10-27 NOTE — PROGRESS NOTES
Name: Dago Walters      : 1955      MRN: [id number]  Encounter Provider: Benita Viramontes DO  Encounter Date: 10/27/2023   Encounter department: [address] [place] [address]     1. Benign hypertension  -     valsartan (DIOVAN) 320 MG tablet; Take 1 tablet (320 mg total) by mouth daily    2. Ovarian mass, right  Assessment & Plan:  Patient has a right ovarian mass. I was able to review her preadmission testing. Urinalysis was negative. Fasting blood glucose was 86. Creatinine was normal at 0.75. Glucose was normal.  Hemoglobin A1c was 5.8%. Chest x-ray was normal.  EKG showed normal sinus rhythm with nonspecific T wave changes. Patient is medically cleared for her proposed surgery. 3. Essential hypertension, benign  Assessment & Plan:  Has hypertension, initially felt to be essential hypertension, may possibly be secondary hypertension. I reviewed her renal artery duplex scan which suggest left arterial occlusive disease. Blood pressure has been reasonably well controlled on ACE inhibitor. However, patient is now experiencing a cough. I am absolutely certain that this is an ACE inhibitor induced cough. I told the patient that we need to stop the lisinopril. Cough will gradually improved but may persist for up to 4 weeks. I switched the patient to valsartan 320 mg. She will take 1 daily. 4. Primary osteoarthritis of right hip  Assessment & Plan:  Patient has plaints of right groin pain. This is consistent with osteoarthritis of the right hip. I am quite certain that this is the diagnosis. For now, patient should take extra strength Tylenol as needed for her symptoms. I told the patient when she recovers from her hysterectomy, we will get an x-ray of the right hip to assess the severity of the osteoarthritis. Subjective       This is a 72-year-old white female who presents to the office today for preoperative medical evaluation.   Patient is scheduled for a laparoscopic total hysterectomy with bilateral salpingo-oophorectomy with robotics on November 3, 2023. Surgeon will be Dr. Fermín Garcia. Patient has 2 complaints today. First, she complains of a dry cough. This began 2 months ago. The patient did a home COVID-19 test which was negative. She tells me she does not feel sick. She describes a tickle in the back of her throat. Her other complaint is she is experiencing pain in the right inguinal area. She notes that when she is walking around she seems to be fine. However, she sits down and then has to try to get up and move, she will feel it. She does not have any difficulty getting in and out of her car but she does have difficulty going up and down steps, having to go 1 step at a time. Review of Systems   Constitutional:  Negative for chills and fever. Respiratory:  Positive for cough. Negative for shortness of breath and wheezing. Cardiovascular:  Negative for chest pain, palpitations and leg swelling. Gastrointestinal:  Negative for abdominal distention, abdominal pain, blood in stool, constipation, diarrhea and nausea. Current Outpatient Medications on File Prior to Visit   Medication Sig    Ascorbic Acid (VITAMIN C PO) Take by mouth in the morning    VITAMIN D PO Take by mouth in the morning    [DISCONTINUED] lisinopril (ZESTRIL) 20 mg tablet Take 1 tablet (20 mg total) by mouth daily       Objective     /88 (BP Location: Left arm, Patient Position: Sitting, Cuff Size: Adult)   Pulse (!) 106   Temp 98.3 °F (36.8 °C) (Tympanic)   Ht 5' 5" (1.651 m)   Wt 61 kg (134 lb 6.4 oz)   LMP  (LMP Unknown)   SpO2 98%   BMI 22.37 kg/m²     Physical Exam  Vitals reviewed. Constitutional:       Comments: This is a pleasant 52-year-old white female who appears her stated age. The patient is not septic in appearance and in no apparent distress   HENT:      Head: Normocephalic and atraumatic.       Right Ear: Tympanic membrane, ear canal and external ear normal. There is no impacted cerumen. Left Ear: Tympanic membrane, ear canal and external ear normal. There is no impacted cerumen. Mouth/Throat:      Mouth: Mucous membranes are moist.      Pharynx: Oropharynx is clear. No oropharyngeal exudate or posterior oropharyngeal erythema. Eyes:      General: No scleral icterus. Right eye: No discharge. Left eye: No discharge. Conjunctiva/sclera: Conjunctivae normal.      Pupils: Pupils are equal, round, and reactive to light. Neck:      Comments: There was no thyromegaly  Cardiovascular:      Rate and Rhythm: Normal rate and regular rhythm. Heart sounds: Normal heart sounds. No murmur heard. No friction rub. No gallop. Pulmonary:      Effort: Pulmonary effort is normal. No respiratory distress. Breath sounds: Normal breath sounds. No stridor. No wheezing, rhonchi or rales. Abdominal:      General: Bowel sounds are normal. There is no distension. Palpations: Abdomen is soft. There is no mass. Tenderness: There is no abdominal tenderness. There is no guarding. Musculoskeletal:      Cervical back: Neck supple. Comments: There was some tenderness noted upon range of motion testing of the right hip   Lymphadenopathy:      Cervical: No cervical adenopathy. Psychiatric:         Mood and Affect: Mood normal.         Behavior: Behavior normal.         Thought Content:  Thought content normal.         Judgment: Judgment normal.     Extremities: Without cyanosis, clubbing, or edema  Mile Blake DO

## 2023-10-27 NOTE — ASSESSMENT & PLAN NOTE
Has hypertension, initially felt to be essential hypertension, may possibly be secondary hypertension. I reviewed her renal artery duplex scan which suggest left arterial occlusive disease. Blood pressure has been reasonably well controlled on ACE inhibitor. However, patient is now experiencing a cough. I am absolutely certain that this is an ACE inhibitor induced cough. I told the patient that we need to stop the lisinopril. Cough will gradually improved but may persist for up to 4 weeks. I switched the patient to valsartan 320 mg. She will take 1 daily.

## 2023-10-27 NOTE — ASSESSMENT & PLAN NOTE
Patient has a right ovarian mass. I was able to review her preadmission testing. Urinalysis was negative. Fasting blood glucose was 86. Creatinine was normal at 0.75. Glucose was normal.  Hemoglobin A1c was 5.8%. Chest x-ray was normal.  EKG showed normal sinus rhythm with nonspecific T wave changes. Patient is medically cleared for her proposed surgery.

## 2023-11-02 NOTE — ANESTHESIA PREPROCEDURE EVALUATION
Procedure:  ([facility]) W/ ROBOT, BILATERAL SALPINGO-OOPHORECTOMY, PSB STAGING (Abdomen)  LAPAROTOMY EXPLORATORY W/ ROBOT (Abdomen)    Relevant Problems   CARDIO   (+) Essential hypertension, benign   (+) Liver hemangioma   (+) Pure hypercholesterolemia   (+) Renal artery aneurysm (HCC)   (+) Splenic artery aneurysm (HCC)      GI/HEPATIC   (+) Liver hemangioma      MUSCULOSKELETAL   (+) Primary osteoarthritis of right hip      Other   (+) Ovarian mass, right        Physical Exam    Airway    Mallampati score: II  TM Distance: <3 FB       Dental   No notable dental hx     Cardiovascular  Rhythm: regular, Rate: normal    Pulmonary   Breath sounds clear to auscultation    Other Findings        Anesthesia Plan  ASA Score- 2     Anesthesia Type- general with ASA Monitors. Additional Monitors:     Airway Plan: ETT. Comment: 2nd PIV, po ativan in SDS for anxiety. Plan Factors-Exercise tolerance (METS): >4 METS. Chart reviewed. Existing labs reviewed. Patient is not a current smoker. Obstructive sleep apnea risk education given perioperatively. Induction- intravenous. Postoperative Plan- Plan for postoperative opioid use. Informed Consent- Anesthetic plan and risks discussed with patient.

## 2023-11-03 ENCOUNTER — ANESTHESIA (OUTPATIENT)
Dept: PERIOP | Facility: HOSPITAL | Age: 68
End: 2023-11-03
Payer: COMMERCIAL

## 2023-11-03 ENCOUNTER — HOSPITAL ENCOUNTER (OUTPATIENT)
Facility: HOSPITAL | Age: 68
Setting detail: OUTPATIENT SURGERY
Discharge: HOME | End: 2023-11-03
Attending: OBSTETRICS & GYNECOLOGY | Admitting: OBSTETRICS & GYNECOLOGY
Payer: COMMERCIAL

## 2023-11-03 VITALS
RESPIRATION RATE: 16 BRPM | HEIGHT: 65 IN | SYSTOLIC BLOOD PRESSURE: 142 MMHG | HEART RATE: 83 BPM | OXYGEN SATURATION: 98 % | DIASTOLIC BLOOD PRESSURE: 76 MMHG | WEIGHT: 136.69 LBS | TEMPERATURE: 97.3 F | BODY MASS INDEX: 22.77 KG/M2

## 2023-11-03 DIAGNOSIS — G89.18 POSTOPERATIVE PAIN: Primary | ICD-10-CM

## 2023-11-03 DIAGNOSIS — N83.209 CYST OF OVARY, UNSPECIFIED LATERALITY: ICD-10-CM

## 2023-11-03 LAB
ABO GROUP BLD: NORMAL
RH BLD: NEGATIVE

## 2023-11-03 PROCEDURE — NC001 PR NO CHARGE: Performed by: PHYSICIAN ASSISTANT

## 2023-11-03 PROCEDURE — 88305 TISSUE EXAM BY PATHOLOGIST: CPT | Performed by: STUDENT IN AN ORGANIZED HEALTH CARE EDUCATION/TRAINING PROGRAM

## 2023-11-03 PROCEDURE — 88112 CYTOPATH CELL ENHANCE TECH: CPT | Performed by: STUDENT IN AN ORGANIZED HEALTH CARE EDUCATION/TRAINING PROGRAM

## 2023-11-03 PROCEDURE — 88331 PATH CONSLTJ SURG 1 BLK 1SPC: CPT | Performed by: OBSTETRICS & GYNECOLOGY

## 2023-11-03 PROCEDURE — S2900 ROBOTIC SURGICAL SYSTEM: HCPCS | Performed by: OBSTETRICS & GYNECOLOGY

## 2023-11-03 PROCEDURE — 58571 TLH W/T/O 250 G OR LESS: CPT | Performed by: OBSTETRICS & GYNECOLOGY

## 2023-11-03 PROCEDURE — 88331 PATH CONSLTJ SURG 1 BLK 1SPC: CPT | Performed by: STUDENT IN AN ORGANIZED HEALTH CARE EDUCATION/TRAINING PROGRAM

## 2023-11-03 PROCEDURE — 88307 TISSUE EXAM BY PATHOLOGIST: CPT | Performed by: STUDENT IN AN ORGANIZED HEALTH CARE EDUCATION/TRAINING PROGRAM

## 2023-11-03 RX ORDER — LIDOCAINE HYDROCHLORIDE 10 MG/ML
INJECTION, SOLUTION EPIDURAL; INFILTRATION; INTRACAUDAL; PERINEURAL AS NEEDED
Status: DISCONTINUED | OUTPATIENT
Start: 2023-11-03 | End: 2023-11-03

## 2023-11-03 RX ORDER — ENOXAPARIN SODIUM 100 MG/ML
40 INJECTION SUBCUTANEOUS
Status: DISCONTINUED | OUTPATIENT
Start: 2023-11-03 | End: 2023-11-03 | Stop reason: HOSPADM

## 2023-11-03 RX ORDER — ACETAMINOPHEN 325 MG/1
975 TABLET ORAL ONCE
Status: COMPLETED | OUTPATIENT
Start: 2023-11-03 | End: 2023-11-03

## 2023-11-03 RX ORDER — ONDANSETRON 2 MG/ML
4 INJECTION INTRAMUSCULAR; INTRAVENOUS ONCE AS NEEDED
Status: DISCONTINUED | OUTPATIENT
Start: 2023-11-03 | End: 2023-11-03 | Stop reason: HOSPADM

## 2023-11-03 RX ORDER — BUPIVACAINE HYDROCHLORIDE 5 MG/ML
INJECTION, SOLUTION EPIDURAL; INTRACAUDAL AS NEEDED
Status: DISCONTINUED | OUTPATIENT
Start: 2023-11-03 | End: 2023-11-03 | Stop reason: HOSPADM

## 2023-11-03 RX ORDER — ONDANSETRON 2 MG/ML
INJECTION INTRAMUSCULAR; INTRAVENOUS AS NEEDED
Status: DISCONTINUED | OUTPATIENT
Start: 2023-11-03 | End: 2023-11-03

## 2023-11-03 RX ORDER — HYDROMORPHONE HCL/PF 1 MG/ML
0.5 SYRINGE (ML) INJECTION
Status: DISCONTINUED | OUTPATIENT
Start: 2023-11-03 | End: 2023-11-03 | Stop reason: HOSPADM

## 2023-11-03 RX ORDER — MAGNESIUM HYDROXIDE 1200 MG/15ML
LIQUID ORAL AS NEEDED
Status: DISCONTINUED | OUTPATIENT
Start: 2023-11-03 | End: 2023-11-03 | Stop reason: HOSPADM

## 2023-11-03 RX ORDER — CEFAZOLIN SODIUM 1 G/50ML
1000 SOLUTION INTRAVENOUS ONCE
Status: COMPLETED | OUTPATIENT
Start: 2023-11-03 | End: 2023-11-03

## 2023-11-03 RX ORDER — SODIUM CHLORIDE, SODIUM LACTATE, POTASSIUM CHLORIDE, CALCIUM CHLORIDE 600; 310; 30; 20 MG/100ML; MG/100ML; MG/100ML; MG/100ML
INJECTION, SOLUTION INTRAVENOUS CONTINUOUS PRN
Status: DISCONTINUED | OUTPATIENT
Start: 2023-11-03 | End: 2023-11-03

## 2023-11-03 RX ORDER — ONDANSETRON 2 MG/ML
4 INJECTION INTRAMUSCULAR; INTRAVENOUS EVERY 6 HOURS PRN
Status: DISCONTINUED | OUTPATIENT
Start: 2023-11-03 | End: 2023-11-03 | Stop reason: HOSPADM

## 2023-11-03 RX ORDER — GABAPENTIN 100 MG/1
100 CAPSULE ORAL ONCE
Status: COMPLETED | OUTPATIENT
Start: 2023-11-03 | End: 2023-11-03

## 2023-11-03 RX ORDER — PROPOFOL 10 MG/ML
INJECTION, EMULSION INTRAVENOUS AS NEEDED
Status: DISCONTINUED | OUTPATIENT
Start: 2023-11-03 | End: 2023-11-03

## 2023-11-03 RX ORDER — OXYCODONE HYDROCHLORIDE 5 MG/1
5 TABLET ORAL EVERY 4 HOURS PRN
Status: DISCONTINUED | OUTPATIENT
Start: 2023-11-03 | End: 2023-11-03 | Stop reason: HOSPADM

## 2023-11-03 RX ORDER — SODIUM CHLORIDE 9 MG/ML
125 INJECTION, SOLUTION INTRAVENOUS CONTINUOUS
Status: DISCONTINUED | OUTPATIENT
Start: 2023-11-03 | End: 2023-11-03 | Stop reason: HOSPADM

## 2023-11-03 RX ORDER — DEXAMETHASONE SODIUM PHOSPHATE 10 MG/ML
INJECTION, SOLUTION INTRAMUSCULAR; INTRAVENOUS AS NEEDED
Status: DISCONTINUED | OUTPATIENT
Start: 2023-11-03 | End: 2023-11-03

## 2023-11-03 RX ORDER — FENTANYL CITRATE 50 UG/ML
INJECTION, SOLUTION INTRAMUSCULAR; INTRAVENOUS AS NEEDED
Status: DISCONTINUED | OUTPATIENT
Start: 2023-11-03 | End: 2023-11-03

## 2023-11-03 RX ORDER — MIDAZOLAM HYDROCHLORIDE 2 MG/2ML
INJECTION, SOLUTION INTRAMUSCULAR; INTRAVENOUS AS NEEDED
Status: DISCONTINUED | OUTPATIENT
Start: 2023-11-03 | End: 2023-11-03

## 2023-11-03 RX ORDER — OXYCODONE HYDROCHLORIDE 5 MG/1
TABLET ORAL
Qty: 10 TABLET | Refills: 0 | Status: SHIPPED | OUTPATIENT
Start: 2023-11-03

## 2023-11-03 RX ORDER — ACETAMINOPHEN 325 MG/1
975 TABLET ORAL EVERY 6 HOURS PRN
Status: DISCONTINUED | OUTPATIENT
Start: 2023-11-03 | End: 2023-11-03 | Stop reason: HOSPADM

## 2023-11-03 RX ORDER — FENTANYL CITRATE/PF 50 MCG/ML
50 SYRINGE (ML) INJECTION
Status: DISCONTINUED | OUTPATIENT
Start: 2023-11-03 | End: 2023-11-03 | Stop reason: HOSPADM

## 2023-11-03 RX ORDER — ROCURONIUM BROMIDE 10 MG/ML
INJECTION, SOLUTION INTRAVENOUS AS NEEDED
Status: DISCONTINUED | OUTPATIENT
Start: 2023-11-03 | End: 2023-11-03

## 2023-11-03 RX ORDER — LORAZEPAM 0.5 MG/1
1 TABLET ORAL ONCE AS NEEDED
Status: COMPLETED | OUTPATIENT
Start: 2023-11-03 | End: 2023-11-03

## 2023-11-03 RX ORDER — IBUPROFEN 600 MG/1
600 TABLET ORAL EVERY 6 HOURS PRN
Status: DISCONTINUED | OUTPATIENT
Start: 2023-11-03 | End: 2023-11-03 | Stop reason: HOSPADM

## 2023-11-03 RX ADMIN — ROCURONIUM BROMIDE 40 MG: 10 INJECTION, SOLUTION INTRAVENOUS at 07:36

## 2023-11-03 RX ADMIN — LORAZEPAM 1 MG: 0.5 TABLET ORAL at 06:20

## 2023-11-03 RX ADMIN — CEFAZOLIN SODIUM 1000 MG: 1 SOLUTION INTRAVENOUS at 07:39

## 2023-11-03 RX ADMIN — LIDOCAINE HYDROCHLORIDE 90 MG: 10 INJECTION, SOLUTION EPIDURAL; INFILTRATION; INTRACAUDAL; PERINEURAL at 07:35

## 2023-11-03 RX ADMIN — ROCURONIUM BROMIDE 10 MG: 10 INJECTION, SOLUTION INTRAVENOUS at 09:20

## 2023-11-03 RX ADMIN — FENTANYL CITRATE 50 MCG: 50 INJECTION INTRAMUSCULAR; INTRAVENOUS at 09:26

## 2023-11-03 RX ADMIN — PROPOFOL 150 MG: 10 INJECTION, EMULSION INTRAVENOUS at 07:35

## 2023-11-03 RX ADMIN — SODIUM CHLORIDE: 0.9 INJECTION, SOLUTION INTRAVENOUS at 07:33

## 2023-11-03 RX ADMIN — GABAPENTIN 100 MG: 100 CAPSULE ORAL at 06:18

## 2023-11-03 RX ADMIN — FENTANYL CITRATE 50 MCG: 50 INJECTION INTRAMUSCULAR; INTRAVENOUS at 08:02

## 2023-11-03 RX ADMIN — IBUPROFEN 600 MG: 600 TABLET, FILM COATED ORAL at 11:59

## 2023-11-03 RX ADMIN — DEXAMETHASONE SODIUM PHOSPHATE 6 MG: 10 INJECTION INTRAMUSCULAR; INTRAVENOUS at 08:12

## 2023-11-03 RX ADMIN — ACETAMINOPHEN 325MG 975 MG: 325 TABLET ORAL at 06:18

## 2023-11-03 RX ADMIN — SUGAMMADEX 200 MG: 100 INJECTION, SOLUTION INTRAVENOUS at 10:12

## 2023-11-03 RX ADMIN — FENTANYL CITRATE 50 MCG: 50 INJECTION INTRAMUSCULAR; INTRAVENOUS at 08:53

## 2023-11-03 RX ADMIN — ONDANSETRON 4 MG: 2 INJECTION INTRAMUSCULAR; INTRAVENOUS at 10:08

## 2023-11-03 RX ADMIN — FENTANYL CITRATE 50 MCG: 50 INJECTION INTRAMUSCULAR; INTRAVENOUS at 07:35

## 2023-11-03 RX ADMIN — ROCURONIUM BROMIDE 10 MG: 10 INJECTION, SOLUTION INTRAVENOUS at 08:49

## 2023-11-03 RX ADMIN — FENTANYL CITRATE 50 MCG: 50 INJECTION INTRAMUSCULAR; INTRAVENOUS at 08:41

## 2023-11-03 RX ADMIN — MIDAZOLAM 2 MG: 1 INJECTION INTRAMUSCULAR; INTRAVENOUS at 07:33

## 2023-11-03 RX ADMIN — SODIUM CHLORIDE, SODIUM LACTATE, POTASSIUM CHLORIDE, AND CALCIUM CHLORIDE: .6; .31; .03; .02 INJECTION, SOLUTION INTRAVENOUS at 08:54

## 2023-11-03 NOTE — INTERVAL H&P NOTE
H&P reviewed. After examining the patient I find no changes in the patients condition since the H&P had been written. Plan robotically assisted total laparoscopic hysterectomy bilateral salpingo-oophorectomy and possible staging for large adnexal mass. Preoperative testing is stable for surgery today.

## 2023-11-03 NOTE — DISCHARGE INSTRUCTIONS
[facility]  Hermelinda Monterroso, Sridevi Iniguez and Herminia Romberg  [phone]    Hysterectomy Discharge Instructions    WHAT YOU NEED TO KNOW:   A hysterectomy is surgery to remove your uterus. Your ovaries, fallopian tubes, cervix, or part of your vagina may also need to be removed. The organs and tissue that will be removed depends on your medical condition. After a hysterectomy, you will not be able to become pregnant. If your ovaries are removed, you will go through menopause if you have not already. DISCHARGE INSTRUCTIONS:   Contact your doctor at the number above if:   You have a fever over 101o. You have nausea or are vomiting that does not improve after a light meal.   Your pain is getting worse, even after you take medicine. You feel pain or burning when you urinate, or you have trouble urinating. You have pus or a foul-smelling odor coming from your vagina. Your wound is red, swollen, or draining pus. You see new or an increased amount of bright red blood coming from your vagina or your incisions. You have questions or concerns about your condition or care. Seek care immediately:   Your arm or leg feels warm, tender, and painful. It may look swollen and red. You have increasing abdominal or pelvic pain. You have heavy vaginal bleeding that fills 1 or more sanitary pads in 1 hour. Call 911 for any of the following: You feel lightheaded, short of breath, and have chest pain. You cough up blood. Medicines: You may need any of the following:  Prescription medicine may be given. You may receive a prescription for pain medication or be advised to use over the counter (OTC) pain medication such as acetaminophen (Tylenol) or ibuprofen (Advil, Motrin). Ask your healthcare provider how to take this medicine safely. NSAIDs , such as ibuprofen, help decrease swelling, pain, and fever.  NSAIDs can cause stomach bleeding or kidney problems in certain people. If you take blood thinner medicine, always ask your healthcare provider if NSAIDs are safe for you. Always read the medicine label and follow directions. Stool softeners help treat or prevent constipation. Take your medicine as directed. Contact your healthcare provider if you think your medicine is not helping or if you have side effects. Tell him or her if you are allergic to any medicine. Keep a list of the medicines, vitamins, and herbs you take. Include the amounts, and when and why you take them. Bring the list or the pill bottles to follow-up visits. Carry your medicine list with you in case of an emergency. Activity:   Rest as needed. Get up and move around as directed to help prevent blood clots. Start with short walks and slowly increase the distance every day. Limit the number of times you climb stairs to 2 times each day for the first week. Plan most of your daily activities on one level of your home. Do not lift objects heavier than 10 pounds for 6 weeks. Avoid strenuous activity for 2 weeks. Do not strain during bowel movements. High-fiber foods and extra liquids can help you prevent constipation. Examples of high-fiber foods are fruit and bran. Prune juice and water are good liquids to drink. Do not have sex, use tampons, or douche for up to 8 weeks. You may shower as soon as the day after surgery. Tub baths can be taken starting 2 weeks after surgery. Do not go into pools or hot tubs until cleared by your doctor. Ask when it is safe for you to drive. It is generally safe to drive after 2 weeks and when no longer taking prescription pain medication. Ask when you may return to work and to other regular activities. Wound care: Care for your abdominal incisions as directed. Carefully wash around the wound with soap and water.  If you have Hibiclens or medicated soap that you were instructed to use before surgery, you may use that to wash with for up to 2 days after surgery. If not, any mild non-scented, non-abrasive soap is safe. It is okay to let the soap and water run over your incision. Do not scrub your incision. Dry the area and put on new, clean bandages as directed. Change your bandages when they get wet or dirty. If you have strips of medical tape, let them fall off on their own. It may take 7 to 14 days for them to fall off. Check your incision every day for redness, swelling, or pus. Deep breathing: Take deep breaths and cough 10 times each hour. This will decrease your risk for a lung infection. Take a deep breath and hold it for as long as you can. Let the air out and then cough strongly. Deep breaths help open your airway. You may be given an incentive spirometer to help you take deep breaths. Put the plastic piece in your mouth and take a slow, deep breath, then let the air out and cough. Repeat these steps 10 times every hour. Get support: This surgery may be life-changing for you and your family. You will no longer be able to get pregnant. Sudden changes in the levels of your hormones may occur and cause mood swings and depression. You may feel angry, sad, or frightened, or cry frequently and unexpectedly. These feelings are normal. Talk to your healthcare provider about where you can get support. You can also ask if hormone replacement medicine is right for you. Follow up with your healthcare provider or gynecologist as directed: You may need to return to have stitches removed, and for other tests. Write down your questions so you remember to ask them during your visits. © 2017 [address] [place] [address] [employer] Information is for End User's use only and may not be sold, redistributed or otherwise used for commercial purposes. All illustrations and images included in CareNotes® are the copyrighted property of [employer]. or Bryon Marcelino. The above information is an  only.  It is not intended as medical advice for individual conditions or treatments. Talk to your doctor, nurse or pharmacist before following any medical regimen to see if it is safe and effective for you.

## 2023-11-03 NOTE — ANESTHESIA POSTPROCEDURE EVALUATION
Post-Op Assessment Note    CV Status:  Stable  Pain Score: 2    Pain management: adequate     Mental Status:  Alert and awake   Hydration Status:  Euvolemic and stable   PONV Controlled:  Controlled   Airway Patency:  Patent      Post Op Vitals Reviewed: Yes      Staff: Anesthesiologist         No notable events documented.     BP      Temp     Pulse     Resp      SpO2      /76   Pulse 83   Temp (!) 97.3 °F (36.3 °C) (Temporal)   Resp 16   Ht 5' 5" (1.651 m)   Wt 62 kg (136 lb 11 oz)   LMP  (LMP Unknown)   SpO2 98%   BMI 22.75 kg/m²

## 2023-11-03 NOTE — OP NOTE
OPERATIVE REPORT  PATIENT NAME: Ismael Centeno    :  1955  MRN: [id number]  Pt Location: [facility] OR ROOM 07    SURGERY DATE: 11/3/2023    Surgeon(s) and Role:     * Fátima Pineda MD - Primary     * Rosaura Mcdonald PA-C - Assisting     * Jerome Hobson MD - Assisting    Preop Diagnosis:  Cyst of ovary, unspecified laterality [N83.209]    Post-Op Diagnosis Codes:     * Cyst of ovary, unspecified laterality [D25.655]    Procedure(s):  ([address] [place] [address]) W/ ROBOT. BILATERAL SALPINGO-OOPHORECTOMY. LYSIS OF ADHESIONS    Specimen(s):  ID Type Source Tests Collected by Time Destination   1 :  Washing Pelvic Washing NON-GYNECOLOGIC CYTOLOGY Fátima Pineda MD 11/3/2023 2199    2 : Right fallopian tube and ovary Tissue Ovary, Right TISSUE EXAM Fátima Pineda MD 11/3/2023 3387    3 : Uterus, cervix, left fallopian tube, and left ovary Tissue Uterus TISSUE EXAM Fátima Pineda MD 11/3/2023 0935        Estimated Blood Loss:   Minimal    Drains:  Urethral Catheter Non-latex 16 Fr. (Active)   Number of days: 0       Anesthesia Type:   General    Operative Indications:  Cyst of ovary, unspecified laterality [N83.209]      Operative Findings:  Upon opening the abdomen patient was noted to have a fibroid uterus normal left tube and ovary. The right ovary was densely adherent to the right pelvic sidewall and posterior cul-de-sac. It measured approximately 10 cm. A small puncture site was noted upon dissection with extensive lysis of adhesions that took over 30 minutes to perform. Frozen section indicated no evidence of malignancy. Upon completion ureters were noted to be intact with visual inspection. Raw surface where the large ovarian mass was identified had some oozing which was controlled with Surgicel. Complications:   None    Procedure and Technique:    The patient was identified as herself and and appropriate time-out procedure was performed.     The patient was placed in dorsal lithotomy position and prepped and draped in the usual sterile fashion including a 3 times vaginal chlorhexadine prep. Attention was turned to the vagina where of Mondragon catheter was placed without difficulty. An EEA sizer was then placed into the vagina without difficulty. Attention was then turned to the abdomen where planned incision sites were marked and infiltrated with 0.5% Marcaine. The periumbilical incision was created with a knife. The Veress needle was placed without difficulty. The abdomen was insufflated with sterile gas. The remainder of the incisions were created with a knife. The 8 mm trocar was placed into the filiberto umbilical incision and a camera was placed without difficulty. Under direct visualization the 3 other DaVinci 8 mm ports were placed without difficulty. A 5 mm assistant port was placed in the right lower quadrant without difficulty. The patient was placed in steep Trendelenburg position. The robot was brought in and side docked without difficulty. Electrocautery sheers were placed in the number 1 arm, a Vessel Sealer was placed in the 3. Arm, and a Prograsp was placed in the 4. Arm. I broke scrub an approached the console. Extensive lysis of adhesions of approximately 30 minutes was used to dissect free the large right ovarian mass densely adherent to the pelvis cul-de-sac and right pelvic sidewall. In general this dissection is not required adding 30 minutes to the case. The right round ligament was taken down with the Vessel Sealer. The right pelvic sidewall was opened with cautery compa. The perivesical and pararectal spaces were open. The infundibular pelvic ligament and ureter on the right were  in this retroperitoneal space. The path of the ureter was identified. The infundibular pelvic ligament was then taken down with the Vessel Sealer in multiple bites. The posterior broad ligament was taken down with electrocautery compa.   The suspensory ligament of the ovary on the right and fallopian tube were transected with the vessel sealer and the specimen was taken out of the field. The bladder flap was begun with the electrocautery Chandu and taken down to the upper vagina. The uterine artery pedicle was skeletonized and taken down with the Vessel Sealer. The cardinal ligament was taken down with sequential bites with the Vessel Sealer. The uterus sacral ligament was taken down with the Vessel Sealer. The same procedure was then performed on the patient's left-hand side again taking care to identify the ureter prior to taking down the infundibular pelvic ligament. The vaginal cuff was then taken down with Electrocautery Chandu. The uterine specimen was retrieved through the vagina with a single tooth tenaculum. The large ovarian mass was placed in a 15 mm Endo Catch bag and brought through into the vagina. It was aspirated within the bag and brought through the vagina. Frozen section indicated benign disease. The vaginal cuff was closed with a running suture of 2 0 Stratafix. Oozing was noted from the right pelvic sidewall with the dense ovarian mass had been. This was cauterized. The ureter was identified and tracked. No injuries were noted. Surgicel was placed without difficulty controlling all bleeding. The abdomen was explored and no further bleeding sites were noted. The pneumoperitoneum was allowed to escape and no bleeding was noted. All instruments were removed. The trocars were removed. The incision sites were closed with 4 0 Monocryl without difficulty. The patient tolerated procedure without complications. The sponge and instrument counts were correct prior to closure. Hemostasis was assured prior to closure. The patient was brought to the recovery room in stable condition. Adela Henriquez MD  Director of Cancer Survivorship  Division of [address] [place] [address] was present for the entire procedure.     Patient Disposition:  PACU         SIGNATURE: Tomer Elliott MD  DATE: November 3, 2023  TIME: 10:14 AM

## 2023-11-09 PROCEDURE — 88305 TISSUE EXAM BY PATHOLOGIST: CPT | Performed by: STUDENT IN AN ORGANIZED HEALTH CARE EDUCATION/TRAINING PROGRAM

## 2023-11-09 PROCEDURE — 88307 TISSUE EXAM BY PATHOLOGIST: CPT | Performed by: STUDENT IN AN ORGANIZED HEALTH CARE EDUCATION/TRAINING PROGRAM

## 2023-11-13 ENCOUNTER — OFFICE VISIT (OUTPATIENT)
Dept: VASCULAR SURGERY | Facility: HOSPITAL | Age: 68
End: 2023-11-13
Payer: COMMERCIAL

## 2023-11-13 VITALS
WEIGHT: 133.2 LBS | SYSTOLIC BLOOD PRESSURE: 132 MMHG | BODY MASS INDEX: 22.17 KG/M2 | TEMPERATURE: 97.6 F | HEART RATE: 98 BPM | OXYGEN SATURATION: 98 % | DIASTOLIC BLOOD PRESSURE: 82 MMHG

## 2023-11-13 DIAGNOSIS — I72.8 SPLENIC ARTERY ANEURYSM (HCC): ICD-10-CM

## 2023-11-13 DIAGNOSIS — I72.2 RENAL ARTERY ANEURYSM (HCC): Primary | ICD-10-CM

## 2023-11-13 PROCEDURE — 99213 OFFICE O/P EST LOW 20 MIN: CPT | Performed by: SURGERY

## 2023-11-13 NOTE — ASSESSMENT & PLAN NOTE
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 Measured 9 mm with a calcified rim. Also noted to be partially thrombosed. This again was not seen on the recent duplex. We will hopefully trying below this on a repeat duplex but if this fails we will then plan to use CT scans for surveillance.  No plan for intervention unless it becomes symptomatic or reaches 3cm per SVS guidelines

## 2023-11-13 NOTE — PROGRESS NOTES
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 Patient's right renal artery aneurysm was noted on a CT scan in March 2023. It measured 1.5 cm and a calcified rim. Patient underwent a duplex recently to monitor for expansion however it was unfortunately not seen. There was a stenosis seen in the left renal artery measuring greater than 60%. Patient was recently placed on valsartan for hypertension which is controlling her blood pressure. Blood pressure in the office today is 490D systolic    I discussed with patient that I would like to attempt trying to visualize it again with another ultrasound however if this fails we will then likely need to follow this with CAT scans. This point the aneurysm is still small with no plan for intervention unless it reaches 3 cm. 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 Measured 9 mm with a calcified rim. Also noted to be partially thrombosed. This again was not seen on the recent duplex. We will hopefully trying below this on a repeat duplex but if this fails we will then plan to use CT scans for surveillance. No plan for intervention unless it becomes symptomatic or reaches 3cm per SVS guidelines       Diagnoses and all orders for this visit:    Renal artery aneurysm ([address])  -     VAS celiac and/or mesenteric duplex; complete study; Future    Splenic artery aneurysm ([facility])  -     VAS celiac and/or mesenteric duplex; complete study; Future          Subjective:      Patient ID: Maxx Sue is a 80 y.o. female. Patient presents today to review renal duplex done 10/25/23. PT reports pain to right groin area  upon getting up from a sitting position and with ambulation  to report  BP has been controlled with Valsartan. HPI    The following portions of the patient's history were reviewed and updated as appropriate: allergies, current medications, past family history, past medical history, past social history, past surgical history, and problem list.    I have spent a total time of 25 minutes on 11/13/23 in caring for this patient including Diagnostic results, Prognosis, Risks and benefits of tx options, Instructions for management, Patient and family education, Importance of tx compliance, Risk factor reductions, Impressions, Counseling / Coordination of care, Documenting in the medical record, Reviewing / ordering tests, medicine, procedures  , and Obtaining or reviewing history  . Review of Systems   Musculoskeletal:  Positive for arthralgias. Objective:      /82   Pulse 98   Temp 97.6 °F (36.4 °C) (Temporal)   Wt 60.4 kg (133 lb 3.2 oz)   LMP  (LMP Unknown)   SpO2 98%   BMI 22.17 kg/m²          Physical Exam  Constitutional:       Appearance: Normal appearance. HENT:      Head: Normocephalic. Mouth/Throat:      Mouth: Mucous membranes are moist.      Pharynx: Oropharynx is clear. Eyes:      Extraocular Movements: Extraocular movements intact. Pupils: Pupils are equal, round, and reactive to light. Cardiovascular:      Rate and Rhythm: Normal rate and regular rhythm. Pulses:           Femoral pulses are 2+ on the right side and 2+ on the left side. Pulmonary:      Effort: Pulmonary effort is normal.   Abdominal:      Tenderness: There is abdominal tenderness (appropriately tender around incision). Musculoskeletal:      Cervical back: Normal range of motion. Right lower leg: No edema. Left lower leg: No edema. Skin:     General: Skin is warm and dry. Neurological:      General: No focal deficit present. Mental Status: She is alert and oriented to person, place, and time.    Psychiatric:         Mood and Affect: Mood normal.         Behavior: Behavior normal.

## 2023-11-13 NOTE — ASSESSMENT & PLAN NOTE
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 Patient's right renal artery aneurysm was noted on a CT scan in March 2023. It measured 1.5 cm and a calcified rim. Patient underwent a duplex recently to monitor for expansion however it was unfortunately not seen. There was a stenosis seen in the left renal artery measuring greater than 60%. Patient was recently placed on valsartan for hypertension which is controlling her blood pressure. Blood pressure in the office today is 990V systolic    I discussed with patient that I would like to attempt trying to visualize it again with another ultrasound however if this fails we will then likely need to follow this with CAT scans. This point the aneurysm is still small with no plan for intervention unless it reaches 3 cm.

## 2023-11-15 ENCOUNTER — TELEPHONE (OUTPATIENT)
Dept: HEMATOLOGY ONCOLOGY | Facility: CLINIC | Age: 68
End: 2023-11-15

## 2023-11-15 DIAGNOSIS — R30.0 DYSURIA: Primary | ICD-10-CM

## 2023-11-15 NOTE — TELEPHONE ENCOUNTER
Patient Call    Who are you speaking with? Patient    If it is not the patient, are they listed on an active communication consent form? N/A   What is the reason for this call? Pt thinks she may have a uti   Does this require a call back? Yes   If a call back is required, please list best call back number [phone]    If a call back is required, advise that a message will be forwarded to their care team and someone will return their call as soon as possible. Did you relay this information to the patient?  Yes

## 2023-11-16 ENCOUNTER — APPOINTMENT (OUTPATIENT)
Dept: LAB | Facility: CLINIC | Age: 68
End: 2023-11-16
Payer: COMMERCIAL

## 2023-11-16 LAB
BACTERIA UR QL AUTO: ABNORMAL /HPF
BILIRUB UR QL STRIP: NEGATIVE
CLARITY UR: CLEAR
COLOR UR: COLORLESS
GLUCOSE UR STRIP-MCNC: NEGATIVE MG/DL
HGB UR QL STRIP.AUTO: NEGATIVE
KETONES UR STRIP-MCNC: NEGATIVE MG/DL
LEUKOCYTE ESTERASE UR QL STRIP: ABNORMAL
NITRITE UR QL STRIP: NEGATIVE
NON-SQ EPI CELLS URNS QL MICRO: ABNORMAL /HPF
PH UR STRIP.AUTO: 6 [PH]
PROT UR STRIP-MCNC: NEGATIVE MG/DL
RBC #/AREA URNS AUTO: ABNORMAL /HPF
SP GR UR STRIP.AUTO: 1.01 (ref 1–1.03)
UROBILINOGEN UR STRIP-ACNC: <2 MG/DL
WBC #/AREA URNS AUTO: ABNORMAL /HPF

## 2023-11-16 PROCEDURE — 81001 URINALYSIS AUTO W/SCOPE: CPT | Performed by: NURSE PRACTITIONER

## 2023-11-17 ENCOUNTER — TELEPHONE (OUTPATIENT)
Dept: HEMATOLOGY ONCOLOGY | Facility: CLINIC | Age: 68
End: 2023-11-17

## 2023-11-17 ENCOUNTER — TELEPHONE (OUTPATIENT)
Dept: GYNECOLOGIC ONCOLOGY | Facility: CLINIC | Age: 68
End: 2023-11-17

## 2023-11-17 NOTE — TELEPHONE ENCOUNTER
Called patient back in regards to her post-op appointment. I instructed patient to call the office back at her earliest convince so we can reschedule her appointment.  I provided the office number

## 2023-11-17 NOTE — TELEPHONE ENCOUNTER
Patient Call    Who are you speaking with? Patient    If it is not the patient, are they listed on an active communication consent form? N/A   What is the reason for this call? Patient calling in regards to her post op appointment with Dr Ami Kimbrough on 11/20/23 at 4:00pm.  Patient will not be able to make this appointment and would like to reschedule. Patient would like a call back to discuss. Does this require a call back? Yes   If a call back is required, please list [facility] call back number [phone]   If a call back is required, advise that a message will be forwarded to their care team and someone will return their call as soon as possible. Did you relay this information to the patient?  Yes

## 2023-11-22 PROCEDURE — 88112 CYTOPATH CELL ENHANCE TECH: CPT | Performed by: STUDENT IN AN ORGANIZED HEALTH CARE EDUCATION/TRAINING PROGRAM

## 2023-11-27 ENCOUNTER — OFFICE VISIT (OUTPATIENT)
Dept: GYNECOLOGIC ONCOLOGY | Facility: CLINIC | Age: 68
End: 2023-11-27

## 2023-11-27 VITALS
TEMPERATURE: 97.6 F | RESPIRATION RATE: 16 BRPM | DIASTOLIC BLOOD PRESSURE: 80 MMHG | HEART RATE: 102 BPM | BODY MASS INDEX: 22.13 KG/M2 | HEIGHT: 65 IN | OXYGEN SATURATION: 98 % | WEIGHT: 132.8 LBS | SYSTOLIC BLOOD PRESSURE: 126 MMHG

## 2023-11-27 DIAGNOSIS — N83.8 OVARIAN MASS, RIGHT: Primary | ICD-10-CM

## 2023-11-27 PROCEDURE — 99024 POSTOP FOLLOW-UP VISIT: CPT | Performed by: OBSTETRICS & GYNECOLOGY

## 2023-11-27 NOTE — PROGRESS NOTES
Assessment/Plan:    Problem List Items Addressed This Visit       Ovarian mass, right - Primary     Patient is stable status post robot-assisted total laparoscopic hysterectomy bilateral salpingo-oophorectomy for benign ovarian mass. She has some urinary urgency however this is likely related to surgery as culture was negative. Patient will follow-up on an as-needed basis. Postoperative instructions were given and restrictions were liberalized. CHIEF COMPLAINT: Postop ovarian mass      Problem:  Cancer Staging   No matching staging information was found for the patient. Previous therapy:  Oncology History    No history exists. Patient ID: Carmen Rick is a 80 y.o. female  Patient is a very pleasant 71-year-old female with a history of robotic assisted total laparoscopic hysterectomy bilateral salpingo-oophorectomy lysis of adhesions for an ovarian mass. Patient surgery was complicated by extensive adhesions. Otherwise the patient did well with surgery. Final pathology report reveals the following:  Final Diagnosis  A. 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 - Fallopian tube with endometriosis and peritubal cysts. B. Uterus, cervix, left fallopian tube and left ovaries; hysterectomy and left salpingo-oophorectomy:  - Cervix with nabothian cysts, benign polyp and atrophic ectocervix. - Endometrium with endometrial hyperplasia without atypia. Myometrium with leiomyomata. - Fallopian tube with peritubal cysts. - Ovary with benign inclusion cysts. Today, the patient is doing well. She denies significant abdominal pain, pelvic pain, nausea, vomiting, constipation, diarrhea, fevers, chills, or vaginal bleeding. Patient did have some urinary urgency and this is stable since surgery and not worsening. Urinalysis was performed which revealed no no evidence of UTI.            The following portions of the patient's history were reviewed and updated as appropriate: allergies, current medications, past family history, past medical history, past social history, past surgical history, and problem list.    Review of Systems   Constitutional: Negative. HENT: Negative. Eyes: Negative. Respiratory: Negative. Cardiovascular: Negative. Gastrointestinal: Negative. Endocrine: Negative. Genitourinary:  Positive for urgency. Musculoskeletal: Negative. Skin: Negative. Neurological: Negative. Hematological: Negative. Psychiatric/Behavioral: Negative. Current Outpatient Medications   Medication Sig Dispense Refill    Ascorbic Acid (VITAMIN C PO) Take by mouth in the morning      valsartan (DIOVAN) 320 MG tablet Take 1 tablet (320 mg total) by mouth daily 90 tablet 3    VITAMIN D PO Take by mouth in the morning      oxyCODONE (ROXICODONE) 5 immediate release tablet 1 tablet by mouth every 4-6 hour as needed (Patient not taking: Reported on 11/27/2023) 10 tablet 0     No current facility-administered medications for this visit. Objective:    Blood pressure 126/80, pulse 102, temperature 97.6 °F (36.4 °C), temperature source Temporal, resp. rate 16, height 5' 5" (1.651 m), weight 60.2 kg (132 lb 12.8 oz), SpO2 98 %. Body mass index is 22.1 kg/m². Body surface area is 1.66 meters squared. Physical Exam  Constitutional:       Appearance: She is well-developed. HENT:      Head: Normocephalic and atraumatic. Neck:      Thyroid: No thyromegaly. Cardiovascular:      Rate and Rhythm: Normal rate and regular rhythm. Heart sounds: Normal heart sounds. Pulmonary:      Effort: Pulmonary effort is normal.      Breath sounds: Normal breath sounds. Abdominal:      General: Bowel sounds are normal.      Palpations: Abdomen is soft. Comments: Well healed laparoscopic incisions.    Genitourinary:     Comments: -Normal external female genitalia, normal Bartholin's and [place]'s glands                  -Normal midline urethral meatus. No lesions notes                  -Bladder without fullness mass or tenderness                  -Vagina without lesion or discharge No significant cystocele or rectocele noted                  -Cervix surgically absent                  -Uterus surgically absent                  -Adnexae surgically absent                  - Anus without fissure of lesion    Musculoskeletal:         General: Normal range of motion. Cervical back: Normal range of motion and neck supple. Lymphadenopathy:      Cervical: No cervical adenopathy. Skin:     General: Skin is warm and dry. Neurological:      Mental Status: She is alert and oriented to person, place, and time.    Psychiatric:         Behavior: Behavior normal.         No results found for: ""  Lab Results   Component Value Date    K 4.0 10/19/2023     10/19/2023    CO2 32 10/19/2023    BUN 18 10/19/2023    CREATININE 0.75 10/19/2023    GLUF 86 10/19/2023    CALCIUM 9.0 10/19/2023    AST 14 10/19/2023    ALT 9 10/19/2023    ALKPHOS 43 10/19/2023    EGFR 82 10/19/2023     Lab Results   Component Value Date    WBC 7.80 10/19/2023    HGB 12.7 10/19/2023    HCT 38.1 10/19/2023    MCV 93 10/19/2023     10/19/2023     Lab Results   Component Value Date    NEUTROABS 5.02 10/19/2023        Trend:  No results found for: ""

## 2023-11-27 NOTE — LETTER
November 27, 2023     Carmella Campos, [address]  [address]  [facility] [address]    Patient: Tamar Dahl   YOB: 1955   Date of Visit: 11/27/2023       Dear Dr. Sade Guthrie:    Thank you for referring Tamar Dahl to me for evaluation. Below are my notes for this consultation. If you have questions, please do not hesitate to call me. I look forward to following your patient along with you. Sincerely,        Trevor Gan MD        CC: No Recipients    Tervor Gan MD  11/27/2023  9:04 AM  Sign when Signing Visit  Assessment/Plan:    Problem List Items Addressed This Visit       Ovarian mass, right - Primary     Patient is stable status post robot-assisted total laparoscopic hysterectomy bilateral salpingo-oophorectomy for benign ovarian mass. She has some urinary urgency however this is likely related to surgery as culture was negative. Patient will follow-up on an as-needed basis. Postoperative instructions were given and restrictions were liberalized. CHIEF COMPLAINT: Postop ovarian mass      Problem:  Cancer Staging   No matching staging information was found for the patient. Previous therapy:  Oncology History    No history exists. Patient ID: Tamar Dahl is a 80 y.o. female  Patient is a very pleasant 62-year-old female with a history of robotic assisted total laparoscopic hysterectomy bilateral salpingo-oophorectomy lysis of adhesions for an ovarian mass. Patient surgery was complicated by extensive adhesions. Otherwise the patient did well with surgery. Final pathology report reveals the following:  Final Diagnosis  A. 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 - Fallopian tube with endometriosis and peritubal cysts.       B. Uterus, cervix, left fallopian tube and left ovaries; hysterectomy and left salpingo-oophorectomy:  - Cervix with nabothian cysts, benign polyp and atrophic ectocervix. - Endometrium with endometrial hyperplasia without atypia. Myometrium with leiomyomata. - Fallopian tube with peritubal cysts. - Ovary with benign inclusion cysts. Today, the patient is doing well. She denies significant abdominal pain, pelvic pain, nausea, vomiting, constipation, diarrhea, fevers, chills, or vaginal bleeding. Patient did have some urinary urgency and this is stable since surgery and not worsening. Urinalysis was performed which revealed no no evidence of UTI. The following portions of the patient's history were reviewed and updated as appropriate: allergies, current medications, past family history, past medical history, past social history, past surgical history, and problem list.    Review of Systems   Constitutional: Negative. HENT: Negative. Eyes: Negative. Respiratory: Negative. Cardiovascular: Negative. Gastrointestinal: Negative. Endocrine: Negative. Genitourinary:  Positive for urgency. Musculoskeletal: Negative. Skin: Negative. Neurological: Negative. Hematological: Negative. Psychiatric/Behavioral: Negative. Current Outpatient Medications   Medication Sig Dispense Refill   • Ascorbic Acid (VITAMIN C PO) Take by mouth in the morning     • valsartan (DIOVAN) 320 MG tablet Take 1 tablet (320 mg total) by mouth daily 90 tablet 3   • VITAMIN D PO Take by mouth in the morning     • oxyCODONE (ROXICODONE) 5 immediate release tablet 1 tablet by mouth every 4-6 hour as needed (Patient not taking: Reported on 11/27/2023) 10 tablet 0     No current facility-administered medications for this visit. Objective:    Blood pressure 126/80, pulse 102, temperature 97.6 °F (36.4 °C), temperature source Temporal, resp. rate 16, height 5' 5" (1.651 m), weight 60.2 kg (132 lb 12.8 oz), SpO2 98 %. Body mass index is 22.1 kg/m². Body surface area is 1.66 meters squared.     Physical Exam  Constitutional:       Appearance: She is well-developed. HENT:      Head: Normocephalic and atraumatic. Neck:      Thyroid: No thyromegaly. Cardiovascular:      Rate and Rhythm: Normal rate and regular rhythm. Heart sounds: Normal heart sounds. Pulmonary:      Effort: Pulmonary effort is normal.      Breath sounds: Normal breath sounds. Abdominal:      General: Bowel sounds are normal.      Palpations: Abdomen is soft. Comments: Well healed laparoscopic incisions. Genitourinary:     Comments: -Normal external female genitalia, normal Bartholin's and [place]'s glands                  -Normal midline urethral meatus. No lesions notes                  -Bladder without fullness mass or tenderness                  -Vagina without lesion or discharge No significant cystocele or rectocele noted                  -Cervix surgically absent                  -Uterus surgically absent                  -Adnexae surgically absent                  - Anus without fissure of lesion    Musculoskeletal:         General: Normal range of motion. Cervical back: Normal range of motion and neck supple. Lymphadenopathy:      Cervical: No cervical adenopathy. Skin:     General: Skin is warm and dry. Neurological:      Mental Status: She is alert and oriented to person, place, and time.    Psychiatric:         Behavior: Behavior normal.         No results found for: ""  Lab Results   Component Value Date    K 4.0 10/19/2023     10/19/2023    CO2 32 10/19/2023    BUN 18 10/19/2023    CREATININE 0.75 10/19/2023    GLUF 86 10/19/2023    CALCIUM 9.0 10/19/2023    AST 14 10/19/2023    ALT 9 10/19/2023    ALKPHOS 43 10/19/2023    EGFR 82 10/19/2023     Lab Results   Component Value Date    WBC 7.80 10/19/2023    HGB 12.7 10/19/2023    HCT 38.1 10/19/2023    MCV 93 10/19/2023     10/19/2023     Lab Results   Component Value Date    NEUTROABS 5.02 10/19/2023        Trend:  No results found for: ""

## 2023-11-27 NOTE — ASSESSMENT & PLAN NOTE
Patient is stable status post robot-assisted total laparoscopic hysterectomy bilateral salpingo-oophorectomy for benign ovarian mass. She has some urinary urgency however this is likely related to surgery as culture was negative. Patient will follow-up on an as-needed basis. Postoperative instructions were given and restrictions were liberalized.

## 2023-12-20 ENCOUNTER — OFFICE VISIT (OUTPATIENT)
Dept: UROLOGY | Facility: CLINIC | Age: 68
End: 2023-12-20
Payer: COMMERCIAL

## 2023-12-20 VITALS
HEIGHT: 65 IN | WEIGHT: 133.6 LBS | OXYGEN SATURATION: 99 % | BODY MASS INDEX: 22.26 KG/M2 | SYSTOLIC BLOOD PRESSURE: 152 MMHG | HEART RATE: 94 BPM | DIASTOLIC BLOOD PRESSURE: 90 MMHG

## 2023-12-20 DIAGNOSIS — N39.0 RECURRENT UTI: Primary | ICD-10-CM

## 2023-12-20 DIAGNOSIS — N20.0 NEPHROLITHIASIS: ICD-10-CM

## 2023-12-20 DIAGNOSIS — N39.0 URINARY TRACT INFECTION WITHOUT HEMATURIA, SITE UNSPECIFIED: ICD-10-CM

## 2023-12-20 LAB
SL AMB  POCT GLUCOSE, UA: NEGATIVE
SL AMB LEUKOCYTE ESTERASE,UA: NORMAL
SL AMB POCT BILIRUBIN,UA: NEGATIVE
SL AMB POCT BLOOD,UA: NORMAL
SL AMB POCT CLARITY,UA: CLEAR
SL AMB POCT COLOR,UA: YELLOW
SL AMB POCT KETONES,UA: NEGATIVE
SL AMB POCT NITRITE,UA: POSITIVE
SL AMB POCT PH,UA: 5.5
SL AMB POCT SPECIFIC GRAVITY,UA: 1.02
SL AMB POCT URINE PROTEIN: NORMAL
SL AMB POCT UROBILINOGEN: 0.2

## 2023-12-20 PROCEDURE — 99213 OFFICE O/P EST LOW 20 MIN: CPT

## 2023-12-20 PROCEDURE — 81002 URINALYSIS NONAUTO W/O SCOPE: CPT

## 2023-12-20 PROCEDURE — 87086 URINE CULTURE/COLONY COUNT: CPT

## 2023-12-20 NOTE — PROGRESS NOTES
12/20/2023    No chief complaint on file.      Assessment and Plan    68 y.o. female     Recurrent UTIs  Last positive urine culture was in September which was her fourth UTI in 6 months.  Her urine dip today is nitrate positive however she denies any voiding symptoms.  We will send this out for urine culture in case she would become symptomatic.  I suspect the likely source of her recurrent UTIs is her right-sided stone disease as she has a 1.1 and 1.2 cm right renal calculi.  Given her recent hysterectomy with bilateral salpingo-oophorectomy in November I would like to wait on additional surgery at this time.  Continue with oral cranberry and d-mannose. I will see her in 3 months with a KUB. If she continues with frequent symptomatic UTIs we can consider low dose daily antibiotics. I would avoid treating her for asymptomatic positive urine culture as this can lead to antibiotic resistance.     Subjective:      She presents today reporting doing very well. She is status post laprascopic hysterecotmy and b/l salpingo-oopherectomy on 11/03. She is doing very well post-op. She denies any lower urinary tract complaints today. Her urine dip is nitrate positive today, but she is asymptomatic.               History of Present Illness  Mookie Muhammad is a 68 y.o. female here for 6-month follow-up evaluation of recurrent UTIs.    Established patient with history of nephrolithiasis and recurrent UTIs and was originally seen by me on 07/07/2022 after presenting to [facility] Emergency Department 2 months earlier on 05/17/2022 for complaints of right flank pain.  CT imaging at that time revealed moderate right hydronephrosis and hydroureter likely secondary to a 2 mm calculus in the urinary bladder.  When I had seen her in the office on 07/07/2022 her urine dip was positive for large blood, negative for leukocytes or nitrates.  Given her smoking history and abnormal findings on prior CT imaging of a 1.6 cm calcified  mass medial to the right kidney suspicious for renal artery aneurysm I was able to get approval for a CT renal protocol.     CT renal protocol identified a right hydronephrosis with a partially obstructing proximal right ureteral calculi measuring 4.4 mm.  In addition, there were nonobstructing right renal calculi and bilateral renal cyst.  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  Patient elected for medical expulsion therapy of the 4.4 mm calculi with repeat Renal US and Xray KUB in 1 month. She was also referred to vascular surgery for further evaluation of the splenic and right renal artery aneurysms.      Renal US from 8/12/2022 showed resolution of right hydronephrosis. There were bilateral non obstructing calculi seen. In addition a 2 cm simple cyst in the right upper pole and a 1.8 cm parapelvic cyst which appears simple. XRAY KUB also confirmed multiple right renal calculi.     She was last seen by me on 6/22/2023 and had reported doing well and denied any lower urinary tract symptoms concerning for infection.  She would experience occasional frequency and urgency but denied any dysuria, abdominal pain, or flank pain.  Her last positive urine culture at the time was 3/22/2023 which was pansensitive E. coli and treated with ciprofloxacin.  She was recommended to start oral cranberry and d-mannose for UTI prevention.      Since her last office visit she is now s/p robotic assisted total laparoscopic hysterectomy bilateral salpingo-oophorectomy lysis of adhesions for an ovarian mass on 11/03/2023. Prior to her recent surgery, she had multiple positive urine cultures as below.          Urine cultures:    6/22/2023: >100,000 cfu/ml Aerococcus urinae (treated with Keflex)    7/7/2023: >100,000 cfu/ml Enterobacter cloacae Abnormal (treated with ciprofloxacin)    8/19/2023: >100,000 cfu/ml Escherichia coli Abnormal (treated with  Keflex)    9/7/2023: >100,000 cfu/ml Escherichia coli (treated with Keflex)    9/22/2023: No Growth <1000 cfu/mL     Review of Systems   Constitutional:  Negative for chills and fever.   HENT:  Negative for ear pain and sore throat.    Eyes:  Negative for pain and visual disturbance.   Respiratory:  Negative for cough and shortness of breath.    Cardiovascular:  Negative for chest pain and palpitations.   Gastrointestinal:  Negative for abdominal pain and vomiting.   Genitourinary:  Negative for dysuria and hematuria.   Musculoskeletal:  Negative for arthralgias and back pain.   Skin:  Negative for color change and rash.   Neurological:  Negative for seizures and syncope.   All other systems reviewed and are negative.              Vitals  There were no vitals filed for this visit.    Physical Exam  Vitals reviewed.   Constitutional:       General: She is not in acute distress.     Appearance: Normal appearance. She is normal weight. She is not ill-appearing or toxic-appearing.   HENT:      Head: Normocephalic and atraumatic.      Nose: Nose normal.   Eyes:      General: No scleral icterus.     Conjunctiva/sclera: Conjunctivae normal.   Cardiovascular:      Rate and Rhythm: Normal rate.      Pulses: Normal pulses.   Pulmonary:      Effort: Pulmonary effort is normal. No respiratory distress.   Abdominal:      General: Abdomen is flat.      Palpations: Abdomen is soft.      Tenderness: There is no abdominal tenderness. There is no right CVA tenderness or left CVA tenderness.      Hernia: No hernia is present.   Musculoskeletal:         General: Normal range of motion.      Cervical back: Normal range of motion.   Skin:     General: Skin is warm and dry.   Neurological:      General: No focal deficit present.      Mental Status: She is alert and oriented to person, place, and time. Mental status is at baseline.   Psychiatric:         Mood and Affect: Mood normal.         Behavior: Behavior normal.         Thought  Content: Thought content normal.         Judgment: Judgment normal.         Past History  Past Medical History:   Diagnosis Date    Allergic     seasonal    Hypertension     Kidney stone     Ovarian mass     Pelvic pain      Social History     Socioeconomic History    Marital status: /Civil Union     Spouse name: Not on file    Number of children: Not on file    Years of education: Not on file    Highest education level: Not on file   Occupational History    Not on file   Tobacco Use    Smoking status: Every Day     Current packs/day: 0.25     Average packs/day: 0.3 packs/day for 52.0 years (13.0 ttl pk-yrs)     Types: Cigarettes     Start date: 1972    Smokeless tobacco: Never   Vaping Use    Vaping status: Never Used   Substance and Sexual Activity    Alcohol use: Yes     Comment: rare    Drug use: Never    Sexual activity: Yes   Other Topics Concern    Not on file   Social History Narrative    Not on file     Social Determinants of Health     Financial Resource Strain: Low Risk  (12/27/2022)    Overall Financial Resource Strain (CARDIA)     Difficulty of Paying Living Expenses: Not hard at all   Food Insecurity: Not on file   Transportation Needs: No Transportation Needs (12/27/2022)    PRAPARE - Transportation     Lack of Transportation (Medical): No     Lack of Transportation (Non-Medical): No   Physical Activity: Not on file   Stress: Not on file   Social Connections: Not on file   Intimate Partner Violence: Not on file   Housing Stability: Not on file     Social History     Tobacco Use   Smoking Status Every Day    Current packs/day: 0.25    Average packs/day: 0.3 packs/day for 52.0 years (13.0 ttl pk-yrs)    Types: Cigarettes    Start date: 1972   Smokeless Tobacco Never     Family History   Problem Relation Age of Onset    No Known Problems Mother     Hypertension Father        The following portions of the patient's history were reviewed and updated as appropriate allergies, current medications,  "past medical history, past social history, past surgical history and problem list    Imaging:    Results  No results found for this or any previous visit (from the past 1 hour(s)).]  No results found for: \"PSA\"  Lab Results   Component Value Date    CALCIUM 9.0 10/19/2023    K 4.0 10/19/2023    CO2 32 10/19/2023     10/19/2023    BUN 18 10/19/2023    CREATININE 0.75 10/19/2023     Lab Results   Component Value Date    WBC 7.80 10/19/2023    HGB 12.7 10/19/2023    HCT 38.1 10/19/2023    MCV 93 10/19/2023     10/19/2023       Please Note:  Voice dictation software has been used to create this document. There may be inadvertent transcriptions errors.     BRANDON Oglesby 12/20/23   "

## 2023-12-22 ENCOUNTER — NURSE TRIAGE (OUTPATIENT)
Age: 68
End: 2023-12-22

## 2023-12-22 DIAGNOSIS — N39.0 RECURRENT UTI: Primary | ICD-10-CM

## 2023-12-22 LAB — BACTERIA UR CULT: NORMAL

## 2023-12-22 RX ORDER — CIPROFLOXACIN 500 MG/1
500 TABLET, FILM COATED ORAL EVERY 12 HOURS SCHEDULED
Qty: 14 TABLET | Refills: 0 | Status: SHIPPED | OUTPATIENT
Start: 2023-12-22 | End: 2023-12-29

## 2023-12-22 NOTE — TELEPHONE ENCOUNTER
Urine culture grew mixed contaminant growth. Given report of symptoms I have sent a prescription for Ciprofloxacin to her pharmacy. Should her symptoms worsen despite taking Cipro, she will need to be evaluated in the ER.

## 2023-12-22 NOTE — TELEPHONE ENCOUNTER
Contacted patient and made her aware Cipro was sent to her pharmacy due to mixed contaminants in urine culture and reports of symptoms. Patient aware of ER precautions due to upcoming weekend.

## 2023-12-22 NOTE — RESULT ENCOUNTER NOTE
Urine culture is negative. I do not recommend antibiotics as patient was asymptomatic at time of collection.

## 2023-12-22 NOTE — TELEPHONE ENCOUNTER
"Answer Assessment - Initial Assessment Questions  1. REASON FOR CALL or QUESTION: \"What is your reason for calling today?\" or \"How can I best help you?\" or \"What question do you have that I can help answer?\"      Patient saw MOHSEN Marques 12/20/23, for possible UTI. Urine dip was done at office visit, urine dip was nitrate positive, culture sent out.     At time of visit patient was asymptomatic. No antibiotics were prescribed.    Urine culture came back negative 12/22/23    Today calling for culture results, and with complaint of more frequent urination, and chills. Patient denies fevers.    Please advise    #[phone]    Protocols used: Information Only Call - No Triage-ADULT-[place]    "

## 2024-01-27 ENCOUNTER — OFFICE VISIT (OUTPATIENT)
Dept: URGENT CARE | Facility: CLINIC | Age: 69
End: 2024-01-27
Payer: COMMERCIAL

## 2024-01-27 VITALS
DIASTOLIC BLOOD PRESSURE: 80 MMHG | OXYGEN SATURATION: 100 % | SYSTOLIC BLOOD PRESSURE: 150 MMHG | HEART RATE: 96 BPM | RESPIRATION RATE: 20 BRPM | WEIGHT: 138.4 LBS | BODY MASS INDEX: 23.06 KG/M2 | HEIGHT: 65 IN | TEMPERATURE: 97.9 F

## 2024-01-27 DIAGNOSIS — N39.0 ACUTE UTI: Primary | ICD-10-CM

## 2024-01-27 LAB
SL AMB  POCT GLUCOSE, UA: ABNORMAL
SL AMB LEUKOCYTE ESTERASE,UA: ABNORMAL
SL AMB POCT BILIRUBIN,UA: ABNORMAL
SL AMB POCT BLOOD,UA: ABNORMAL
SL AMB POCT CLARITY,UA: CLEAR
SL AMB POCT COLOR,UA: YELLOW
SL AMB POCT KETONES,UA: ABNORMAL
SL AMB POCT NITRITE,UA: ABNORMAL
SL AMB POCT PH,UA: 6.5
SL AMB POCT SPECIFIC GRAVITY,UA: 1
SL AMB POCT URINE PROTEIN: ABNORMAL
SL AMB POCT UROBILINOGEN: 0.2

## 2024-01-27 PROCEDURE — 87086 URINE CULTURE/COLONY COUNT: CPT | Performed by: PHYSICIAN ASSISTANT

## 2024-01-27 PROCEDURE — 81002 URINALYSIS NONAUTO W/O SCOPE: CPT | Performed by: PHYSICIAN ASSISTANT

## 2024-01-27 PROCEDURE — 99213 OFFICE O/P EST LOW 20 MIN: CPT | Performed by: PHYSICIAN ASSISTANT

## 2024-01-27 RX ORDER — CEPHALEXIN 500 MG/1
500 CAPSULE ORAL EVERY 12 HOURS SCHEDULED
Qty: 14 CAPSULE | Refills: 0 | Status: SHIPPED | OUTPATIENT
Start: 2024-01-27 | End: 2024-02-03

## 2024-01-27 NOTE — PROGRESS NOTES
[facility]        NAME: Mookie Muhammad is a 68 y.o. female  : 1955    MRN: [id number]  DATE: 2024  TIME: 12:12 PM    Assessment and Plan   Acute UTI [N39.0]  1. Acute UTI  POCT urine dip    Urine culture    cephalexin (KEFLEX) 500 mg capsule            Patient Instructions       Follow up with PCP in 3-5 days.  Proceed to  ER if symptoms worsen.    Chief Complaint     Chief Complaint   Patient presents with    Possible UTI     Frequency and intermittent chills since yesterday          History of Present Illness       Patient is a 68 year old female presenting to [facility] with urinary frequency and chills.  Patient reports symptoms began last evening.  Patient reports urgency but not burning w/ urination.  Patient drinks plenty of water w/ lemon.  Patient denies fever, abdominal/flank pain, N/V/D.    Urinary Tract Infection   This is a new problem. The current episode started in the past 7 days. The problem has been gradually worsening. The patient is experiencing no pain. There has been no fever. Associated symptoms include chills, frequency and urgency. Pertinent negatives include no hematuria or vomiting.       Review of Systems   Review of Systems   Constitutional:  Positive for chills. Negative for fever.   HENT:  Negative for ear pain and sore throat.    Eyes:  Negative for pain and visual disturbance.   Respiratory:  Negative for cough and shortness of breath.    Cardiovascular:  Negative for chest pain and palpitations.   Gastrointestinal:  Negative for abdominal pain and vomiting.   Genitourinary:  Positive for frequency and urgency. Negative for dysuria and hematuria.   Musculoskeletal:  Negative for arthralgias and back pain.   Skin:  Negative for color change and rash.   Neurological:  Negative for seizures and syncope.   All other systems reviewed and are negative.        Current Medications       Current Outpatient Medications:     Ascorbic Acid (VITAMIN C PO), Take by  "mouth in the morning, Disp: , Rfl:     cephalexin (KEFLEX) 500 mg capsule, Take 1 capsule (500 mg total) by mouth every 12 (twelve) hours for 7 days, Disp: 14 capsule, Rfl: 0    valsartan (DIOVAN) 320 MG tablet, Take 1 tablet (320 mg total) by mouth daily, Disp: 90 tablet, Rfl: 3    VITAMIN D PO, Take by mouth in the morning, Disp: , Rfl:     Current Allergies     Allergies as of 01/27/2024 - Reviewed 01/27/2024   Allergen Reaction Noted    Sulfa antibiotics Hives and Palpitations 02/26/2016    Macrobid [nitrofurantoin] Hives 03/09/2023            The following portions of the patient's history were reviewed and updated as appropriate: allergies, current medications, past family history, past medical history, past social history, past surgical history and problem list.     Past Medical History:   Diagnosis Date    Allergic     seasonal    Hypertension     Kidney stone     Ovarian mass     Pelvic pain        Past Surgical History:   Procedure Laterality Date    COLONOSCOPY      EXTRACORPOREAL SHOCK WAVE LITHOTRIPSY      ND LAPS TOTAL HYSTERECT 250 GM/< W/RMVL TUBE/OVARY N/A 11/3/2023    Procedure: (LTH) W/ ROBOT, BILATERAL SALPINGO-OOPHORECTOMY, LYSIS OF ADHESIONS;  Surgeon: Pineda Montemayor MD;  Location: [facility] OR;  Service: Gynecology Oncology       Family History   Problem Relation Age of Onset    No Known Problems Mother     Hypertension Father          Medications have been verified.        Objective   /80   Pulse 96   Temp 97.9 °F (36.6 °C)   Resp 20   Ht 5' 5\" (1.651 m)   Wt 62.8 kg (138 lb 6.4 oz)   LMP  (LMP Unknown)   SpO2 100%   BMI 23.03 kg/m²   No LMP recorded (lmp unknown). Patient has had a hysterectomy.       Physical Exam     Physical Exam  Constitutional:       Appearance: Normal appearance.   HENT:      Head: Normocephalic and atraumatic.      Nose: Nose normal.      Mouth/Throat:      Mouth: Mucous membranes are moist.   Eyes:      Extraocular Movements: Extraocular movements " intact.      Conjunctiva/sclera: Conjunctivae normal.      Pupils: Pupils are equal, round, and reactive to light.   Cardiovascular:      Rate and Rhythm: Normal rate.      Heart sounds: No murmur heard.     No friction rub. No gallop.   Pulmonary:      Effort: Pulmonary effort is normal.      Breath sounds: No stridor. No wheezing, rhonchi or rales.   Abdominal:      Tenderness: There is no abdominal tenderness.   Musculoskeletal:         General: Normal range of motion.      Cervical back: Normal range of motion and neck supple.   Skin:     General: Skin is warm and dry.      Capillary Refill: Capillary refill takes less than 2 seconds.   Neurological:      General: No focal deficit present.      Mental Status: She is alert and oriented to person, place, and time.   Psychiatric:         Mood and Affect: Mood normal.         Behavior: Behavior normal.

## 2024-01-28 LAB — BACTERIA UR CULT: NORMAL

## 2024-02-05 ENCOUNTER — VBI (OUTPATIENT)
Dept: ADMINISTRATIVE | Facility: OTHER | Age: 69
End: 2024-02-05

## 2024-02-21 PROBLEM — N39.0 FREQUENT UTI: Status: RESOLVED | Noted: 2023-03-23 | Resolved: 2024-02-21

## 2024-03-01 ENCOUNTER — HOSPITAL ENCOUNTER (OUTPATIENT)
Dept: RADIOLOGY | Facility: HOSPITAL | Age: 69
Discharge: HOME | End: 2024-03-01
Payer: COMMERCIAL

## 2024-03-01 ENCOUNTER — OFFICE VISIT (OUTPATIENT)
Dept: FAMILY MEDICINE CLINIC | Facility: CLINIC | Age: 69
End: 2024-03-01
Payer: COMMERCIAL

## 2024-03-01 VITALS
HEART RATE: 92 BPM | TEMPERATURE: 96.5 F | DIASTOLIC BLOOD PRESSURE: 84 MMHG | WEIGHT: 138.8 LBS | HEIGHT: 65 IN | BODY MASS INDEX: 23.13 KG/M2 | OXYGEN SATURATION: 100 % | SYSTOLIC BLOOD PRESSURE: 136 MMHG

## 2024-03-01 DIAGNOSIS — I10 ESSENTIAL HYPERTENSION, BENIGN: Primary | ICD-10-CM

## 2024-03-01 DIAGNOSIS — M16.11 PRIMARY OSTEOARTHRITIS OF RIGHT HIP: ICD-10-CM

## 2024-03-01 DIAGNOSIS — Z12.31 ENCOUNTER FOR SCREENING MAMMOGRAM FOR MALIGNANT NEOPLASM OF BREAST: ICD-10-CM

## 2024-03-01 DIAGNOSIS — Z00.00 MEDICARE ANNUAL WELLNESS VISIT, SUBSEQUENT: ICD-10-CM

## 2024-03-01 DIAGNOSIS — N30.00 ACUTE CYSTITIS WITHOUT HEMATURIA: ICD-10-CM

## 2024-03-01 DIAGNOSIS — I72.2 RENAL ARTERY ANEURYSM (HCC): ICD-10-CM

## 2024-03-01 LAB
SL AMB  POCT GLUCOSE, UA: NORMAL
SL AMB LEUKOCYTE ESTERASE,UA: NORMAL
SL AMB POCT BILIRUBIN,UA: NORMAL
SL AMB POCT BLOOD,UA: NORMAL
SL AMB POCT CLARITY,UA: CLEAR
SL AMB POCT COLOR,UA: YELLOW
SL AMB POCT KETONES,UA: NORMAL
SL AMB POCT NITRITE,UA: NORMAL
SL AMB POCT PH,UA: 5
SL AMB POCT SPECIFIC GRAVITY,UA: 1
SL AMB POCT URINE PROTEIN: NORMAL
SL AMB POCT UROBILINOGEN: 0.2

## 2024-03-01 PROCEDURE — 81002 URINALYSIS NONAUTO W/O SCOPE: CPT | Performed by: FAMILY MEDICINE

## 2024-03-01 PROCEDURE — 87086 URINE CULTURE/COLONY COUNT: CPT | Performed by: FAMILY MEDICINE

## 2024-03-01 PROCEDURE — 87077 CULTURE AEROBIC IDENTIFY: CPT | Performed by: FAMILY MEDICINE

## 2024-03-01 PROCEDURE — 87186 SC STD MICRODIL/AGAR DIL: CPT | Performed by: FAMILY MEDICINE

## 2024-03-01 PROCEDURE — G0439 PPPS, SUBSEQ VISIT: HCPCS | Performed by: FAMILY MEDICINE

## 2024-03-01 PROCEDURE — 99214 OFFICE O/P EST MOD 30 MIN: CPT | Performed by: FAMILY MEDICINE

## 2024-03-01 PROCEDURE — 73502 X-RAY EXAM HIP UNI 2-3 VIEWS: CPT

## 2024-03-01 NOTE — PROGRESS NOTES
Assessment and Plan:     Problem List Items Addressed This Visit          Cardiovascular and Mediastinum    Essential hypertension, benign - Primary     Patient has hypertension.  I checked her blood pressure myself today and found her blood pressure to be 136/84.  Patient does check her blood pressures regularly at home and finds them to be quite a bit better than this.  Nevertheless, blood pressure is controlled today in the office.  Continue valsartan 320 mg daily.         Renal artery aneurysm (HCC)       Musculoskeletal and Integument    Primary osteoarthritis of right hip     Patient has osteoarthritis of the hip, severity unknown.  I recommended x-ray of the hip to assess severity.  I will make further recommendations to the patient when I get the results.  For now, I simply recommend Tylenol as needed         Relevant Orders    XR hip/pelv 2-3 vws right if performed (Completed)       Genitourinary    Acute cystitis without hematuria     Urine dipstick was done.  This was unremarkable.  I sent her urine out for culture.  I did not prescribe any antibiotics at this time.  I will contact her with results and make further recommendations.  Patient does complain of recurrent urinary tract infections.  I reviewed her records.  On January 27 she had a urine culture done that was negative.  There was no infection.  On December 20, she had a urine culture done.  This showed greater than 100,000 mixed contaminants.  On September 23, she had a urine culture done which was negative.         Relevant Orders    POCT urine dip (Completed)    Urine culture (Completed)       Other    Encounter for screening mammogram for malignant neoplasm of breast    Relevant Orders    Mammo screening bilateral w 3d & cad    Medicare annual wellness visit, subsequent       Depression Screening and Follow-up Plan: Patient was screened for depression during today's encounter. They screened negative with a PHQ-2 score of 0.    Urinary  Incontinence Plan of Care: counseling topics discussed: practice Kegel (pelvic floor strengthening) exercises. Referral was placed for Urology.     Tobacco Cessation Counseling: Tobacco cessation counseling was provided. The patient is sincerely urged to quit consumption of tobacco. She is not ready to quit tobacco.       Preventive health issues were discussed with patient, and age appropriate screening tests were ordered as noted in patient's After Visit Summary.  Personalized health advice and appropriate referrals for health education or preventive services given if needed, as noted in patient's After Visit Summary.     History of Present Illness:     Patient presents for a Medicare Wellness Visit    Is a 68-year-old white female who presents to the office today for her routine checkup as well as for her annual Medicare wellness exam.  Patient did well postoperatively.  She is currently feeling well.  She is concerned about recurrent urinary tract infections.  He tells me that she has an upcoming appointment to see urology for further evaluation.  Her other complaint today is pain in her right groin.  She tells me when she first gets up the next few steps are very painful.  She can then walk without difficulty.  She does have difficulty going up and down steps.  She has a little difficulty getting in and out of her vehicle.  She tells me it is really not difficult.       Patient Care Team:  Jase García DO as PCP - General (Family Medicine)     Review of Systems:     Review of Systems   Constitutional:  Positive for fatigue.   Cardiovascular:  Negative for chest pain, palpitations and leg swelling.   Gastrointestinal:  Negative for abdominal distention, abdominal pain, blood in stool, constipation, diarrhea and nausea.   Genitourinary:  Positive for dysuria.        Reports recurrent urinary tract infections   Musculoskeletal:  Positive for arthralgias. Negative for gait problem.        Problem List:     Patient  Active Problem List   Diagnosis    Acute cystitis without hematuria    Other fatigue    Essential hypertension, benign    Pure hypercholesterolemia    Liver hemangioma    Pelvic mass    Vaccine counseling    Immunization not carried out because of patient decision    Mammogram declined    Ovarian mass, right    Seasonal allergic rhinitis due to pollen    Renal artery aneurysm (HCC)    Splenic artery aneurysm (HCC)    Encounter for long-term (current) use of medications    Encounter for hepatitis C screening test for low risk patient    Primary osteoarthritis of right hip    Encounter for screening mammogram for malignant neoplasm of breast    Medicare annual wellness visit, subsequent      Past Medical and Surgical History:     Past Medical History:   Diagnosis Date    Allergic     seasonal    Hypertension     Kidney stone     Ovarian mass     Pelvic pain      Past Surgical History:   Procedure Laterality Date    COLONOSCOPY      EXTRACORPOREAL SHOCK WAVE LITHOTRIPSY      [place] LAPS TOTAL HYSTERECT 250 GM/< W/RMVL TUBE/OVARY N/A 11/3/2023    Procedure: (LTH) W/ ROBOT, BILATERAL SALPINGO-OOPHORECTOMY, LYSIS OF ADHESIONS;  Surgeon: Pineda Montemayor MD;  Location: [facility] OR;  Service: Gynecology Oncology      Family History:     Family History   Problem Relation Age of Onset    No Known Problems Mother     Hypertension Father       Social History:     Social History     Socioeconomic History    Marital status: /Civil Union     Spouse name: None    Number of children: None    Years of education: None    Highest education level: None   Occupational History    None   Tobacco Use    Smoking status: Every Day     Current packs/day: 0.25     Average packs/day: 0.3 packs/day for 52.2 years (13.0 ttl pk-yrs)     Types: Cigarettes     Start date: 1972     Passive exposure: Current    Smokeless tobacco: Never   Vaping Use    Vaping status: Never Used   Substance and Sexual Activity    Alcohol use: Yes     Comment: rare     Drug use: Never    Sexual activity: Yes   Other Topics Concern    None   Social History Narrative    None     Social Determinants of Health     Financial Resource Strain: Low Risk  (3/1/2024)    Overall Financial Resource Strain (CARDIA)     Difficulty of Paying Living Expenses: Not hard at all   Food Insecurity: Not on file   Transportation Needs: No Transportation Needs (3/1/2024)    PRAPARE - Transportation     Lack of Transportation (Medical): No     Lack of Transportation (Non-Medical): No   Physical Activity: Not on file   Stress: Not on file   Social Connections: Not on file   Intimate Partner Violence: Not on file   Housing Stability: Not on file      Medications and Allergies:     Current Outpatient Medications   Medication Sig Dispense Refill    Ascorbic Acid (VITAMIN C PO) Take by mouth in the morning      valsartan (DIOVAN) 320 MG tablet Take 1 tablet (320 mg total) by mouth daily 90 tablet 3    ciprofloxacin (CIPRO) 250 mg tablet Take 1 tablet (250 mg total) by mouth 2 (two) times a day for 7 days 14 tablet 0    VITAMIN D PO Take by mouth in the morning (Patient not taking: Reported on 3/1/2024)       No current facility-administered medications for this visit.     Allergies   Allergen Reactions    Sulfa Antibiotics Hives and Palpitations    Macrobid [Nitrofurantoin] Hives      Immunizations:     Immunization History   Administered Date(s) Administered    COVID-19 [employer] ([employer]) vaccine 0.5 mL 09/20/2021      Health Maintenance:         Topic Date Due    Breast Cancer Screening: Mammogram  Never done    Colorectal Cancer Screening  04/09/2026    Hepatitis C Screening  Completed         Topic Date Due    Pneumococcal Vaccine: 65+ Years (1 of 2 - PCV) Never done    Influenza Vaccine (1) Never done    COVID-19 Vaccine (2 - 2023-24 season) 09/01/2023      Medicare Screening Tests and Risk Assessments:     Mookie MCKEON is here for her Subsequent Wellness visit. Last Medicare Wellness visit information reviewed,  patient interviewed and updates made to the record today.      Health Risk Assessment:   Patient rates overall health as very good. Patient feels that their physical health rating is same. Patient is satisfied with their life. Eyesight was rated as same. Hearing was rated as same. Patient feels that their emotional and mental health rating is same. Patients states they are never, rarely angry. Patient states they are often unusually tired/fatigued. Pain experienced in the last 7 days has been none. Patient states that she has experienced no weight loss or gain in last 6 months.     Depression Screening:   PHQ-2 Score: 0      Fall Risk Screening:   In the past year, patient has experienced: no history of falling in past year      Urinary Incontinence Screening:   Patient has leaked urine accidently in the last six months.     Home Safety:  Patient does not have trouble with stairs inside or outside of their home. Patient has working smoke alarms and has working carbon monoxide detector. Home safety hazards include: none.     Nutrition:   Current diet is Regular and No Added Salt.     Medications:   Patient is not currently taking any over-the-counter supplements. Patient is able to manage medications.     Activities of Daily Living (ADLs)/Instrumental Activities of Daily Living (IADLs):   Walk and transfer into and out of bed and chair?: Yes  Dress and groom yourself?: Yes    Bathe or shower yourself?: Yes    Feed yourself? Yes  Do your laundry/housekeeping?: Yes  Manage your money, pay your bills and track your expenses?: Yes  Make your own meals?: Yes    Do your own shopping?: Yes    Previous Hospitalizations:   Any hospitalizations or ED visits within the last 12 months?: Yes    How many hospitalizations have you had in the last year?: 1-2    Advance Care Planning:   Living will: No    Durable POA for healthcare: No      Cognitive Screening:   Provider or family/friend/caregiver concerned regarding cognition?:  "No    PREVENTIVE SCREENINGS      Cardiovascular Screening:    General: Screening Not Indicated and History Lipid Disorder      Diabetes Screening:     General: Screening Current      Colorectal Cancer Screening:     General: Screening Current      Breast Cancer Screening:     General: Risks and Benefits Discussed    Due for: Mammogram        Cervical Cancer Screening:    General: Screening Not Indicated      Osteoporosis Screening:    General: Risks and Benefits Discussed and Patient Declines      Abdominal Aortic Aneurysm (AAA) Screening:        General: Screening Not Indicated      Lung Cancer Screening:     General: Screening Not Indicated      Hepatitis C Screening:    General: Screening Current    Screening, Brief Intervention, and Referral to Treatment (SBIRT)    Screening  Typical number of drinks in a day: 0  Typical number of drinks in a week: 0  Interpretation: Low risk drinking behavior.    AUDIT-C Screenin) How often did you have a drink containing alcohol in the past year? never  2) How many drinks did you have on a typical day when you were drinking in the past year? 0  3) How often did you have 6 or more drinks on one occasion in the past year? never    AUDIT-C Score: 0  Interpretation: Score 0-2 (female): Negative screen for alcohol misuse    Single Item Drug Screening:  How often have you used an illegal drug (including marijuana) or a prescription medication for non-medical reasons in the past year? never    Single Item Drug Screen Score: 0  Interpretation: Negative screen for possible drug use disorder    No results found.     Physical Exam:     /84 (BP Location: Left arm, Patient Position: Sitting, Cuff Size: Standard)   Pulse 92   Temp (!) 96.5 °F (35.8 °C) (Tympanic)   Ht 5' 5\" (1.651 m)   Wt 63 kg (138 lb 12.8 oz)   LMP  (LMP Unknown)   SpO2 100%   BMI 23.10 kg/m²     Physical Exam  Vitals reviewed.   Constitutional:       Comments: This is a 68-year-old white female who " appears her stated age.  Patient is pleasant, cooperative, and in no distress   HENT:      Head: Normocephalic and atraumatic.      Right Ear: Tympanic membrane, ear canal and external ear normal. There is no impacted cerumen.      Left Ear: Tympanic membrane, ear canal and external ear normal. There is no impacted cerumen.      Mouth/Throat:      Mouth: Mucous membranes are moist.      Pharynx: Oropharynx is clear. No oropharyngeal exudate or posterior oropharyngeal erythema.   Eyes:      General: No scleral icterus.        Right eye: No discharge.         Left eye: No discharge.      Conjunctiva/sclera: Conjunctivae normal.      Pupils: Pupils are equal, round, and reactive to light.   Neck:      Vascular: No carotid bruit.      Comments: There is no thyromegaly  Cardiovascular:      Rate and Rhythm: Normal rate and regular rhythm.      Heart sounds: Normal heart sounds. No murmur heard.     No friction rub. No gallop.   Pulmonary:      Effort: Pulmonary effort is normal. No respiratory distress.      Breath sounds: Normal breath sounds. No stridor. No wheezing, rhonchi or rales.   Abdominal:      General: Bowel sounds are normal. There is no distension.      Palpations: Abdomen is soft. There is no mass.      Tenderness: There is no abdominal tenderness. There is no guarding.      Comments: No organomegaly   Musculoskeletal:      Cervical back: Neck supple.      Comments: There is pain upon range of motion testing of the right hip   Lymphadenopathy:      Cervical: No cervical adenopathy.   Psychiatric:         Mood and Affect: Mood normal.         Behavior: Behavior normal.         Thought Content: Thought content normal.         Judgment: Judgment normal.     Extremities: Without cyanosis, clubbing, or edema    Jase García DO

## 2024-03-01 NOTE — PATIENT INSTRUCTIONS
Medicare Preventive Visit Patient Instructions  Thank you for completing your Welcome to Medicare Visit or Medicare Annual Wellness Visit today. Your next wellness visit will be due in one year (3/2/2025).  The screening/preventive services that you may require over the next 5-10 years are detailed below. Some tests may not apply to you based off risk factors and/or age. Screening tests ordered at today's visit but not completed yet may show as past due. Also, please note that scanned in results may not display below.  Preventive Screenings:  Service Recommendations Previous Testing/Comments   Colorectal Cancer Screening  * Colonoscopy    * Fecal Occult Blood Test (FOBT)/Fecal Immunochemical Test (FIT)  * Fecal DNA/Cologuard Test  * Flexible Sigmoidoscopy Age: 45-75 years old   Colonoscopy: every 10 years (may be performed more frequently if at higher risk)  OR  FOBT/FIT: every 1 year  OR  Cologuard: every 3 years  OR  Sigmoidoscopy: every 5 years  Screening may be recommended earlier than age 45 if at higher risk for colorectal cancer. Also, an individualized decision between you and your healthcare provider will decide whether screening between the ages of 76-85 would be appropriate. Colonoscopy: 04/09/2016  FOBT/FIT: Not on file  Cologuard: Not on file  Sigmoidoscopy: Not on file    Screening Current     Breast Cancer Screening Age: 40+ years old  Frequency: every 1-2 years  Not required if history of left and right mastectomy Mammogram: Not on file        Cervical Cancer Screening Between the ages of 21-29, pap smear recommended once every 3 years.   Between the ages of 30-65, can perform pap smear with HPV co-testing every 5 years.   Recommendations may differ for women with a history of total hysterectomy, cervical cancer, or abnormal pap smears in past. Pap Smear: 09/19/2023    Screening Not Indicated   Hepatitis C Screening Once for adults born between 1945 and 1965  More frequently in patients at high risk  for Hepatitis C Hep C Antibody: 09/22/2023    Screening Current   Diabetes Screening 1-2 times per year if you're at risk for diabetes or have pre-diabetes Fasting glucose: 86 mg/dL (10/19/2023)  A1C: 5.8 % (10/19/2023)  Screening Current   Cholesterol Screening Once every 5 years if you don't have a lipid disorder. May order more often based on risk factors. Lipid panel: 09/22/2023    Screening Not Indicated  History Lipid Disorder     Other Preventive Screenings Covered by Medicare:  Abdominal Aortic Aneurysm (AAA) Screening: covered once if your at risk. You're considered to be at risk if you have a family history of AAA.  Lung Cancer Screening: covers low dose CT scan once per year if you meet all of the following conditions: (1) Age 55-77; (2) No signs or symptoms of lung cancer; (3) Current smoker or have quit smoking within the last 15 years; (4) You have a tobacco smoking history of at least 20 pack years (packs per day multiplied by number of years you smoked); (5) You get a written order from a healthcare provider.  Glaucoma Screening: covered annually if you're considered high risk: (1) You have diabetes OR (2) Family history of glaucoma OR (3)  aged 50 and older OR (4)  American aged 65 and older  Osteoporosis Screening: covered every 2 years if you meet one of the following conditions: (1) You're estrogen deficient and at risk for osteoporosis based off medical history and other findings; (2) Have a vertebral abnormality; (3) On glucocorticoid therapy for more than 3 months; (4) Have primary hyperparathyroidism; (5) On osteoporosis medications and need to assess response to drug therapy.   Last bone density test (DXA Scan): Not on file.  HIV Screening: covered annually if you're between the age of 15-65. Also covered annually if you are younger than 15 and older than 65 with risk factors for HIV infection. For pregnant patients, it is covered up to 3 times per  pregnancy.    Immunizations:  Immunization Recommendations   Influenza Vaccine Annual influenza vaccination during flu season is recommended for all persons aged >= 6 months who do not have contraindications   Pneumococcal Vaccine   * Pneumococcal conjugate vaccine = PCV13 (Prevnar 13), PCV15 (Vaxneuvance), PCV20 (Prevnar 20)  * Pneumococcal polysaccharide vaccine = PPSV23 (Pneumovax) Adults 19-65 yo with certain risk factors or if 65+ yo  If never received any pneumonia vaccine: recommend Prevnar 20 (PCV20)  Give PCV20 if previously received 1 dose of PCV13 or PPSV23   Hepatitis B Vaccine 3 dose series if at intermediate or high risk (ex: diabetes, end stage renal disease, liver disease)   Respiratory syncytial virus (RSV) Vaccine - COVERED BY MEDICARE PART D  * RSVPreF3 (Arexvy) CDC recommends that adults 60 years of age and older may receive a single dose of RSV vaccine using shared clinical decision-making (SCDM)   Tetanus (Td) Vaccine - COST NOT COVERED BY MEDICARE PART B Following completion of primary series, a booster dose should be given every 10 years to maintain immunity against tetanus. Td may also be given as tetanus wound prophylaxis.   Tdap Vaccine - COST NOT COVERED BY MEDICARE PART B Recommended at least once for all adults. For pregnant patients, recommended with each pregnancy.   Shingles Vaccine (Shingrix) - COST NOT COVERED BY MEDICARE PART B  2 shot series recommended in those 19 years and older who have or will have weakened immune systems or those 50 years and older     Health Maintenance Due:      Topic Date Due   • Breast Cancer Screening: Mammogram  Never done   • Colorectal Cancer Screening  04/09/2026   • Hepatitis C Screening  Completed     Immunizations Due:      Topic Date Due   • Pneumococcal Vaccine: 65+ Years (1 of 2 - PCV) Never done   • Influenza Vaccine (1) Never done   • COVID-19 Vaccine (2 - 2023-24 season) 09/01/2023     Advance Directives   What are advance directives?   Advance directives are legal documents that state your wishes and plans for medical care. These plans are made ahead of time in case you lose your ability to make decisions for yourself. Advance directives can apply to any medical decision, such as the treatments you want, and if you want to donate organs.   What are the types of advance directives?  There are many types of advance directives, and each state has rules about how to use them. You may choose a combination of any of the following:  Living will:  This is a written record of the treatment you want. You can also choose which treatments you do not want, which to limit, and which to stop at a certain time. This includes surgery, medicine, IV fluid, and tube feedings.   Durable power of  for healthcare (DPAHC):  This is a written record that states who you want to make healthcare choices for you when you are unable to make them for yourself. This person, called a proxy, is usually a family member or a friend. You may choose more than 1 proxy.  Do not resuscitate (DNR) order:  A DNR order is used in case your heart stops beating or you stop breathing. It is a request not to have certain forms of treatment, such as CPR. A DNR order may be included in other types of advance directives.  Medical directive:  This covers the care that you want if you are in a coma, near death, or unable to make decisions for yourself. You can list the treatments you want for each condition. Treatment may include pain medicine, surgery, blood transfusions, dialysis, IV or tube feedings, and a ventilator (breathing machine).  Values history:  This document has questions about your views, beliefs, and how you feel and think about life. This information can help others choose the care that you would choose.  Why are advance directives important?  An advance directive helps you control your care. Although spoken wishes may be used, it is better to have your wishes written down.  Spoken wishes can be misunderstood, or not followed. Treatments may be given even if you do not want them. An advance directive may make it easier for your family to make difficult choices about your care.   Urinary Incontinence   Urinary incontinence (UI)  is when you lose control of your bladder. UI develops because your bladder cannot store or empty urine properly. The 3 most common types of UI are stress incontinence, urge incontinence, or both.  Medicines:   May be given to help strengthen your bladder control. Report any side effects of medication to your healthcare provider.  Do pelvic muscle exercises often:  Your pelvic muscles help you stop urinating. Squeeze these muscles tight for 5 seconds, then relax for 5 seconds. Gradually work up to squeezing for 10 seconds. Do 3 sets of 15 repetitions a day, or as directed. This will help strengthen your pelvic muscles and improve bladder control.  Train your bladder:  Go to the bathroom at set times, such as every 2 hours, even if you do not feel the urge to go. You can also try to hold your urine when you feel the urge to go. For example, hold your urine for 5 minutes when you feel the urge to go. As that becomes easier, hold your urine for 10 minutes.   Self-care:   Keep a UI record.  Write down how often you leak urine and how much you leak. Make a note of what you were doing when you leaked urine.  Drink liquids as directed. You may need to limit the amount of liquid you drink to help control your urine leakage. Do not drink any liquid right before you go to bed. Limit or do not have drinks that contain caffeine or alcohol.   Prevent constipation.  Eat a variety of high-fiber foods. Good examples are high-fiber cereals, beans, vegetables, and whole-grain breads. Walking is the best way to trigger your intestines to have a bowel movement.  Exercise regularly and maintain a healthy weight.  Weight loss and exercise will decrease pressure on your bladder and help  you control your leakage.   Use a catheter as directed  to help empty your bladder. A catheter is a tiny, plastic tube that is put into your bladder to drain your urine.   Go to behavior therapy as directed.  Behavior therapy may be used to help you learn to control your urge to urinate.    Cigarette Smoking and Your Health   Risks to your health if you smoke:  Nicotine and other chemicals found in tobacco damage every cell in your body. Even if you are a light smoker, you have an increased risk for cancer, heart disease, and lung disease. If you are pregnant or have diabetes, smoking increases your risk for complications.   Benefits to your health if you stop smoking:   You decrease respiratory symptoms such as coughing, wheezing, and shortness of breath.   You reduce your risk for cancers of the lung, mouth, throat, kidney, bladder, pancreas, stomach, and cervix. If you already have cancer, you increase the benefits of chemotherapy. You also reduce your risk for cancer returning or a second cancer from developing.   You reduce your risk for heart disease, blood clots, heart attack, and stroke.   You reduce your risk for lung infections, and diseases such as pneumonia, asthma, chronic bronchitis, and emphysema.  Your circulation improves. More oxygen can be delivered to your body. If you have diabetes, you lower your risk for complications, such as kidney, artery, and eye diseases. You also lower your risk for nerve damage. Nerve damage can lead to amputations, poor vision, and blindness.  You improve your body's ability to heal and to fight infections.  For more information and support to stop smoking:   Smokefree.gov  Phone: 8- 996 - 202-7112  Web Address: www.[employer]     © Copyright [employer] 2018 Information is for End User's use only and may not be sold, redistributed or otherwise used for commercial purposes. All illustrations and images included in CareNotes® are the copyrighted property of  [employer]. or [employer]

## 2024-03-03 LAB — BACTERIA UR CULT: ABNORMAL

## 2024-03-04 DIAGNOSIS — N30.00 ACUTE CYSTITIS WITHOUT HEMATURIA: Primary | ICD-10-CM

## 2024-03-04 RX ORDER — CIPROFLOXACIN 250 MG/1
250 TABLET, FILM COATED ORAL 2 TIMES DAILY
Qty: 14 TABLET | Refills: 0 | Status: SHIPPED | OUTPATIENT
Start: 2024-03-04 | End: 2024-03-07 | Stop reason: HOSPADM

## 2024-03-06 PROBLEM — Z12.31 ENCOUNTER FOR SCREENING MAMMOGRAM FOR MALIGNANT NEOPLASM OF BREAST: Status: ACTIVE | Noted: 2024-03-06

## 2024-03-06 PROBLEM — Z00.00 MEDICARE ANNUAL WELLNESS VISIT, SUBSEQUENT: Status: ACTIVE | Noted: 2024-03-06

## 2024-03-06 NOTE — ASSESSMENT & PLAN NOTE
Patient has osteoarthritis of the hip, severity unknown.  I recommended x-ray of the hip to assess severity.  I will make further recommendations to the patient when I get the results.  For now, I simply recommend Tylenol as needed

## 2024-03-06 NOTE — ASSESSMENT & PLAN NOTE
Urine dipstick was done.  This was unremarkable.  I sent her urine out for culture.  I did not prescribe any antibiotics at this time.  I will contact her with results and make further recommendations.  Patient does complain of recurrent urinary tract infections.  I reviewed her records.  On January 27 she had a urine culture done that was negative.  There was no infection.  On December 20, she had a urine culture done.  This showed greater than 100,000 mixed contaminants.  On September 23, she had a urine culture done which was negative.

## 2024-03-06 NOTE — ASSESSMENT & PLAN NOTE
Patient has hypertension.  I checked her blood pressure myself today and found her blood pressure to be 136/84.  Patient does check her blood pressures regularly at home and finds them to be quite a bit better than this.  Nevertheless, blood pressure is controlled today in the office.  Continue valsartan 320 mg daily.

## 2024-03-07 ENCOUNTER — TELEPHONE (OUTPATIENT)
Dept: FAMILY MEDICINE CLINIC | Facility: CLINIC | Age: 69
End: 2024-03-07

## 2024-03-07 DIAGNOSIS — N30.00 ACUTE CYSTITIS WITHOUT HEMATURIA: Primary | ICD-10-CM

## 2024-03-07 RX ORDER — DOXYCYCLINE HYCLATE 100 MG/1
100 CAPSULE ORAL 2 TIMES DAILY
Qty: 14 CAPSULE | Refills: 0 | Status: SHIPPED | OUTPATIENT
Start: 2024-03-07 | End: 2024-03-14

## 2024-03-07 NOTE — TELEPHONE ENCOUNTER
Mookie is having a reaction to the  cipro, she is itchy and nauseated and freezing. Is there something else you could prescribe her its the same feeling she would get when she would take the macrobid. Please Advise!

## 2024-03-07 NOTE — TELEPHONE ENCOUNTER
The only other antibiotic that will work is doxycycline. I sent a prescription in for her. Have her stop the cipro.

## 2024-03-21 ENCOUNTER — HOSPITAL ENCOUNTER (OUTPATIENT)
Dept: RADIOLOGY | Facility: HOSPITAL | Age: 69
Discharge: HOME | End: 2024-03-21
Payer: COMMERCIAL

## 2024-03-21 DIAGNOSIS — N20.0 NEPHROLITHIASIS: ICD-10-CM

## 2024-03-21 PROCEDURE — 74018 RADEX ABDOMEN 1 VIEW: CPT

## 2024-03-25 ENCOUNTER — TELEPHONE (OUTPATIENT)
Dept: UROLOGY | Facility: CLINIC | Age: 69
End: 2024-03-25

## 2024-03-25 NOTE — TELEPHONE ENCOUNTER
Spoke to the  3/25 to inform him of the cancellation of his and his wife appointment for 5/24 with doctor Jaleel he ok the new date and time of 5/16 at 10:45 am and  11 am

## 2024-04-11 ENCOUNTER — TELEPHONE (OUTPATIENT)
Age: 69
End: 2024-04-11

## 2024-04-11 NOTE — TELEPHONE ENCOUNTER
Ear Cerumen Removal Procedure Note      Verbal order was obtained from Tessy Gallegos to proceed with right Ear Cerumen Removal of impaction for patient.    Procedure Note:  Otoscopic evaluation of cerumen was visualized by this caregiver 3% hydrogen peroxide.  The ear canal was irrigated with a solution of 1 part 3% hydrogen peroxide to 4 parts \"body temperature\" water until the majority of the cerumen was flushed out of the canal.    Removal took a short amount of time and was not difficult at all.    The patient tolerated the procedure well, without difficulty.  Provider was updated, and informed that patient was ready for reassessment.     Received call from pt asking about pre instructions for her VAS celiac/mesenteric doppler scheduled for tomorrow, 4/12/2024. All questions answered.

## 2024-04-12 ENCOUNTER — HOSPITAL ENCOUNTER (OUTPATIENT)
Dept: NON INVASIVE DIAGNOSTICS | Facility: HOSPITAL | Age: 69
Discharge: HOME | End: 2024-04-12
Attending: SURGERY
Payer: COMMERCIAL

## 2024-04-12 DIAGNOSIS — I72.2 RENAL ARTERY ANEURYSM (HCC): ICD-10-CM

## 2024-04-12 DIAGNOSIS — I72.8 SPLENIC ARTERY ANEURYSM (HCC): ICD-10-CM

## 2024-04-12 PROCEDURE — 93975 VASCULAR STUDY: CPT

## 2024-05-01 PROBLEM — N30.00 ACUTE CYSTITIS WITHOUT HEMATURIA: Status: RESOLVED | Noted: 2022-04-25 | Resolved: 2024-05-01

## 2024-05-01 PROBLEM — Z00.00 MEDICARE ANNUAL WELLNESS VISIT, SUBSEQUENT: Status: RESOLVED | Noted: 2024-03-06 | Resolved: 2024-05-01

## 2024-05-13 ENCOUNTER — OFFICE VISIT (OUTPATIENT)
Dept: VASCULAR SURGERY | Facility: HOSPITAL | Age: 69
End: 2024-05-13
Payer: COMMERCIAL

## 2024-05-13 VITALS
WEIGHT: 137.6 LBS | SYSTOLIC BLOOD PRESSURE: 126 MMHG | HEART RATE: 84 BPM | BODY MASS INDEX: 22.92 KG/M2 | HEIGHT: 65 IN | TEMPERATURE: 97.6 F | OXYGEN SATURATION: 97 % | DIASTOLIC BLOOD PRESSURE: 74 MMHG

## 2024-05-13 DIAGNOSIS — I72.2 RENAL ARTERY ANEURYSM (HCC): ICD-10-CM

## 2024-05-13 DIAGNOSIS — I72.8 SPLENIC ARTERY ANEURYSM (HCC): Primary | ICD-10-CM

## 2024-05-13 PROCEDURE — 99214 OFFICE O/P EST MOD 30 MIN: CPT | Performed by: SURGERY

## 2024-05-13 NOTE — ASSESSMENT & PLAN NOTE
68-year-old female presents back to the office for result review regarding her visceral aneurysms.  Patient has known splenic artery aneurysm seen on CT scan in March 2023 which measured 9 mm.  Patient's recent duplex shows stability of her aneurysm measuring 1 cm.      Will plan for a repeat duplex in 1 year.

## 2024-05-13 NOTE — PROGRESS NOTES
Assessment/Plan:    Splenic artery aneurysm (HCC)  68-year-old female presents back to the office for result review regarding her visceral aneurysms.  Patient has known splenic artery aneurysm seen on CT scan in March 2023 which measured 9 mm.  Patient's recent duplex shows stability of her aneurysm measuring 1 cm.      Will plan for a repeat duplex in 1 year.     Renal artery aneurysm (HCC)  Patient's known renal artery aneurysm with calcific rim measured 1.5 cm on CT scan in March 2023.  Most recent duplex showing stability of her aneurysm measuring 1.5 cm.      Seen on the duplex in October 2023 there is bilateral mid renal artery stenosis.    No plan for interventions at this time.  Will continue with surveillance.  Will plan for repeat duplex in 1 year       Diagnoses and all orders for this visit:    Splenic artery aneurysm (HCC)  -     Cancel: VAS CELIAC AND/OR MESENTERIC DUPLEX; Future  -     VAS CELIAC AND/OR MESENTERIC DUPLEX; Future    Renal artery aneurysm (HCC)          Subjective:      Patient ID: Mookie Muhammad is a 68 y.o.   female.      Patient presents today to review celiac/mesenteric duplex done 4/12/24. Patient has no symptoms to report. Patient is a current smoker 5 daily.     HPI    The following portions of the patient's history were reviewed and updated as appropriate: allergies, current medications, past family history, past medical history, past social history, past surgical history, and problem list.    I have spent a total time of 30 minutes on 05/13/24 in caring for this patient including Diagnostic results, Prognosis, Risks and benefits of tx options, Instructions for management, Patient and family education, Importance of tx compliance, Risk factor reductions, Impressions, Counseling / Coordination of care, Documenting in the medical record, Reviewing / ordering tests, medicine, procedures  , and Obtaining or reviewing history  .      Review of Systems   Constitutional: Negative.   "  Eyes: Negative.    Endocrine: Negative.    Genitourinary: Negative.    Musculoskeletal:  Positive for arthralgias, back pain and joint swelling.   Allergic/Immunologic: Negative.    Neurological: Negative.          Objective:      /74   Pulse 84   Temp 97.6 °F (36.4 °C) (Temporal)   Ht 5' 5\" (1.651 m)   Wt 62.4 kg (137 lb 9.6 oz)   LMP  (LMP Unknown)   SpO2 97%   BMI 22.90 kg/m²          Physical Exam  Constitutional:       Appearance: Normal appearance.   HENT:      Head: Normocephalic.      Mouth/Throat:      Mouth: Mucous membranes are moist.      Pharynx: Oropharynx is clear.   Eyes:      Extraocular Movements: Extraocular movements intact.      Pupils: Pupils are equal, round, and reactive to light.   Cardiovascular:      Rate and Rhythm: Normal rate and regular rhythm.   Pulmonary:      Effort: Pulmonary effort is normal.   Abdominal:      General: Abdomen is flat.      Tenderness: There is no abdominal tenderness.   Musculoskeletal:      Cervical back: Normal range of motion.      Right lower leg: No edema.      Left lower leg: No edema.   Skin:     General: Skin is warm and dry.   Neurological:      General: No focal deficit present.      Mental Status: She is alert and oriented to person, place, and time.   Psychiatric:         Mood and Affect: Mood normal.         Behavior: Behavior normal.           "

## 2024-05-13 NOTE — ASSESSMENT & PLAN NOTE
Patient's known renal artery aneurysm with calcific rim measured 1.5 cm on CT scan in March 2023.  Most recent duplex showing stability of her aneurysm measuring 1.5 cm.      Seen on the duplex in October 2023 there is bilateral mid renal artery stenosis.    No plan for interventions at this time.  Will continue with surveillance.  Will plan for repeat duplex in 1 year

## 2024-05-16 ENCOUNTER — OFFICE VISIT (OUTPATIENT)
Dept: UROLOGY | Facility: CLINIC | Age: 69
End: 2024-05-16
Payer: COMMERCIAL

## 2024-05-16 VITALS
HEART RATE: 84 BPM | WEIGHT: 137.8 LBS | BODY MASS INDEX: 22.96 KG/M2 | OXYGEN SATURATION: 97 % | HEIGHT: 65 IN | DIASTOLIC BLOOD PRESSURE: 84 MMHG | SYSTOLIC BLOOD PRESSURE: 136 MMHG

## 2024-05-16 DIAGNOSIS — N39.0 URINARY TRACT INFECTION WITHOUT HEMATURIA, SITE UNSPECIFIED: ICD-10-CM

## 2024-05-16 DIAGNOSIS — N20.0 NEPHROLITHIASIS: ICD-10-CM

## 2024-05-16 DIAGNOSIS — N39.0 RECURRENT UTI: Primary | ICD-10-CM

## 2024-05-16 LAB
SL AMB  POCT GLUCOSE, UA: ABNORMAL
SL AMB LEUKOCYTE ESTERASE,UA: ABNORMAL
SL AMB POCT BILIRUBIN,UA: ABNORMAL
SL AMB POCT BLOOD,UA: ABNORMAL
SL AMB POCT CLARITY,UA: CLEAR
SL AMB POCT COLOR,UA: YELLOW
SL AMB POCT KETONES,UA: ABNORMAL
SL AMB POCT NITRITE,UA: ABNORMAL
SL AMB POCT PH,UA: 6
SL AMB POCT SPECIFIC GRAVITY,UA: 1
SL AMB POCT URINE PROTEIN: ABNORMAL
SL AMB POCT UROBILINOGEN: 0.2

## 2024-05-16 PROCEDURE — 81002 URINALYSIS NONAUTO W/O SCOPE: CPT | Performed by: UROLOGY

## 2024-05-16 PROCEDURE — 99214 OFFICE O/P EST MOD 30 MIN: CPT | Performed by: UROLOGY

## 2024-05-16 RX ORDER — ESTRADIOL 0.1 MG/G
1 CREAM VAGINAL EVERY OTHER DAY
Qty: 42.5 G | Refills: 1 | Status: SHIPPED | OUTPATIENT
Start: 2024-05-16

## 2024-05-16 NOTE — PATIENT INSTRUCTIONS
Good fluid hydration, control of bowel habits with avoidance of constipation, 100% cranberry juice or cranberry supplement tabs, pro- or prebiotics, and D-mannose (a supplement available OTC which reduces bacterial binding to the bladder wall) have all been shown to improve recurrent urinary infection    Commercials products: Azo, Cystex, online [employer]    ---    The use of vaginal estrogen therapy causes minimal systemic absorption, and studies have identified serum estrogen levels that remain in the postmenopausal range after treatment with topical/local therapies.     Adverse events with vaginal estrogen are minimal, including headache (4%-13%), vulvovaginal pruritus (8%), genital candidiasis (6% to 8%), leukorrhea (7%), vaginitis (5%), vaginal discomfort (?5%), vaginal pain (?5%), bacterial vaginosis (4%; asymptomatic), vaginal hemorrhage (4%), and UTI (2%).     Treatment with vaginal estrogen therapy has not been found to be associated with the risk of development of endometrial carcinoma or increase in endometrial thickness in systematic reviews and long-duration observational studies. Similarly, in multiple systematic reviews and large observational studies, there was no identified risk of venous thromboembolism related to treatment with local estrogen therapy.     The American College of Gyne[employer] and the [place] [employer] released a position statement in 2021, which reads, “Women with a history of any type of cancer, including estrogen-receptor-positive cancer, should use vaginal estrogen if required and if beneficial; they should continue using this in the long term.”

## 2024-05-16 NOTE — PROGRESS NOTES
ASSESSMENT:     68 y.o. female  with stone disease and recurrent UTI    PLAN:     The patient's major issue is her recurrent UTIs.  Today, there is question bacteriuria although the patient is asymptomatic.  I do not think she would warrant antibiotic therapy.  Review of prior cultures demonstrates resistance pattern and the patient does have allergies.  We discussed today that she should really only be treated if she is having symptoms of infection with fevers chills hematuria or abdominal pain.    Preventative strategies reviewed including cranberry supplements, d-mannose and pro or prebiotic's.  I do think that the patient would strongly benefit from estrogen replacement therapy.  Hysterectomy did not reveal any malignancy.  We discussed Estrace cream being applied twice a week to help control urinary symptoms, reduce UTIs and improve vaginal health.  Patient is agreeable and excited to try this medication.    With regard to her stone disease, she is asymptomatic.  She would prefer to avoid surgical treatment and I think this is reasonable.  I will recommend she have KUB and ultrasound follow-up in January 2025.  Should she develop new or concerning symptoms, certainly she is starting to reach size of the stone greater than 1 cm they could warrant elective treatment if she were interested        ----          UROLOGY FOLLOWUP NOTE     CHIEF COMPLAINT   Mookie Muhammad is a 68 y.o. female with a complaint of   Chief Complaint   Patient presents with    Recurrent UTI    Follow-up    Nephrolithiasis       History of Present Illness:   Mookie Muhammad is a 68 y.o. female here for evaluation of Ongoing issues with recurrent UTIs.  Patient has had issues with bacteriuria and occasional symptoms.  She has been on multiple rounds of antibiotics and deals with bacterial resistance and major issues with allergies to antibiotics.  She is frustrated with her recurrent UTIs.  She did have a prior surgical hysterectomy  and oophorectomy.  Pathology was benign.  Patient does have a history of stone disease but is asymptomatic and has continued to prefer nonsurgical observational approach.      Past Medical History:     Past Medical History:   Diagnosis Date    Allergic     seasonal    Hypertension     Kidney stone     Ovarian mass     Pelvic pain        PAST SURGICAL HISTORY:     Past Surgical History:   Procedure Laterality Date    COLONOSCOPY      EXTRACORPOREAL SHOCK WAVE LITHOTRIPSY      ME LAPS TOTAL HYSTERECT 250 GM/< W/RMVL TUBE/OVARY N/A 11/3/2023    Procedure: (LTH) W/ ROBOT, BILATERAL SALPINGO-OOPHORECTOMY, LYSIS OF ADHESIONS;  Surgeon: Pineda Montemayor MD;  Location: [facility] OR;  Service: Gynecology Oncology       CURRENT MEDICATIONS:     Current Outpatient Medications   Medication Sig Dispense Refill    Ascorbic Acid (VITAMIN C PO) Take by mouth in the morning      valsartan (DIOVAN) 320 MG tablet Take 1 tablet (320 mg total) by mouth daily 90 tablet 3    VITAMIN D PO Take by mouth in the morning (Patient not taking: Reported on 3/1/2024)       No current facility-administered medications for this visit.       ALLERGIES:     Allergies   Allergen Reactions    Ciprofloxacin Anaphylaxis    Sulfa Antibiotics Hives and Palpitations    Macrobid [Nitrofurantoin] Hives       SOCIAL HISTORY:     Social History     Socioeconomic History    Marital status: /Civil Union     Spouse name: None    Number of children: None    Years of education: None    Highest education level: None   Occupational History    None   Tobacco Use    Smoking status: Every Day     Current packs/day: 0.25     Average packs/day: 0.3 packs/day for 52.4 years (13.1 ttl pk-yrs)     Types: Cigarettes     Start date: 1972     Passive exposure: Current    Smokeless tobacco: Never   Vaping Use    Vaping status: Never Used   Substance and Sexual Activity    Alcohol use: Yes     Comment: rare    Drug use: Never    Sexual activity: Yes   Other Topics Concern     "None   Social History Narrative    None     Social Determinants of Health     Financial Resource Strain: Low Risk  (3/1/2024)    Overall Financial Resource Strain (CARDIA)     Difficulty of Paying Living Expenses: Not hard at all   Food Insecurity: Not on file   Transportation Needs: No Transportation Needs (3/1/2024)    PRAPARE - Transportation     Lack of Transportation (Medical): No     Lack of Transportation (Non-Medical): No   Physical Activity: Not on file   Stress: Not on file   Social Connections: Not on file   Intimate Partner Violence: Not on file   Housing Stability: Not on file       SOCIAL HISTORY:     Family History   Problem Relation Age of Onset    No Known Problems Mother     Hypertension Father        REVIEW OF SYSTEMS:     Review of Systems   Constitutional:  Negative for chills and fever.   HENT:  Negative for ear pain and sore throat.    Eyes:  Negative for pain and visual disturbance.   Respiratory:  Negative for cough and shortness of breath.    Cardiovascular:  Negative for chest pain and palpitations.   Gastrointestinal:  Negative for abdominal pain and vomiting.   Genitourinary:  Negative for dysuria and hematuria.   Musculoskeletal:  Negative for arthralgias and back pain.   Skin:  Negative for color change and rash.   Neurological:  Negative for seizures and syncope.   All other systems reviewed and are negative.        PHYSICAL EXAM:     /84 (BP Location: Left arm, Patient Position: Sitting, Cuff Size: Adult)   Pulse 84   Ht 5' 5\" (1.651 m)   Wt 62.5 kg (137 lb 12.8 oz)   LMP  (LMP Unknown)   SpO2 97%   BMI 22.93 kg/m²     Physical Exam  Vitals reviewed.   Constitutional:       General: She is not in acute distress.     Appearance: She is well-developed.   HENT:      Head: Normocephalic and atraumatic.   Eyes:      Pupils: Pupils are equal, round, and reactive to light.   Cardiovascular:      Rate and Rhythm: Normal rate.   Pulmonary:      Effort: Pulmonary effort is normal. " No respiratory distress.      Breath sounds: Normal breath sounds.   Abdominal:      General: There is no distension.      Palpations: Abdomen is soft.      Tenderness: There is no abdominal tenderness.   Musculoskeletal:         General: Normal range of motion.      Cervical back: Normal range of motion and neck supple.   Skin:     General: Skin is warm and dry.   Neurological:      Mental Status: She is alert and oriented to person, place, and time.   Psychiatric:         Behavior: Behavior normal.         LABS:     CBC:   Lab Results   Component Value Date    WBC 7.80 10/19/2023    HGB 12.7 10/19/2023    HCT 38.1 10/19/2023    MCV 93 10/19/2023     10/19/2023       BMP:   Lab Results   Component Value Date    CALCIUM 9.0 10/19/2023    K 4.0 10/19/2023    CO2 32 10/19/2023     10/19/2023    BUN 18 10/19/2023    CREATININE 0.75 10/19/2023     Urine Culture >100,000 cfu/ml Enterobacter cloacae Abnormal    This organism has been edited. The previous result was Gram Negative Wyatt on 3/2/2024 at 0948 EST.        Susceptibility     Enterobacter cloacae     BELINDA     Amoxicillin + Clavulanate >16/8 ug/ml Resistant     Ampicillin ($$) <=8.00 ug/ml Resistant     Ampicillin + Sulbactam ($) 8/4 ug/ml Resistant     Aztreonam ($$$) <=4 ug/ml Susceptible     Cefazolin ($) >16.00 ug/ml Resistant     Ceftazidime ($$) <=1 ug/ml Susceptible     Ceftriaxone ($$) <=1.00 ug/ml Susceptible     Cefuroxime ($$) <=4 ug/ml Susceptible     Ciprofloxacin ($) <=0.25 ug/ml Susceptible     Ertapenem ($$$) <=0.5 ug/ml Susceptible     Gentamicin ($$) <=2 ug/ml Susceptible     Levofloxacin ($) <=0.50 ug/ml Susceptible     Nitrofurantoin <=32 ug/ml Susceptible     Piperacillin + Tazobactam ($$$) <=8 ug/ml Susceptible     Tetracycline <=4 ug/ml Susceptible     Tobramycin ($) <=2 ug/ml Susceptible     Trimethoprim + Sulfamethoxazole ($$$) <=0.5/9.5 u... Susceptible     ZID Performed Yes                   Specimen Collected: 03/01/24              IMAGING:     RENAL ULTRASOUND     INDICATION:   N20.0: Calculus of kidney.     COMPARISON: Comparison is made to the kidneys and urinary bladder on CT of abdomen and pelvis dated 3/9/2023     TECHNIQUE:   Ultrasound of the retroperitoneum was performed with a curvilinear transducer utilizing volumetric sweeps and still imaging techniques.     FINDINGS:     KIDNEYS:  Symmetric and normal size.  Right kidney:  9.7 x 5.8 x 5.0 cm. Volume 148.5 mL  Left kidney:  9.5 x 5.1 x 5.3 cm.  Volume 133.8 mL     Right kidney  Normal echogenicity and contour.  1.9 cm anechoic simple cyst.  Mild fullness to the right renal collecting system.  1.1 cm midpole shadowing calculus. Additional 1.2 cm shadowing calculus is noted.  No perinephric fluid collections.     Left kidney  Normal echogenicity and contour.  1.0 cm anechoic simple cyst.  No hydronephrosis.  No shadowing calculi.  No perinephric fluid collections.     URETERS:  Nonvisualized.     BLADDER:  Suboptimally distended.  No focal thickening or mass lesions.  Bilateral ureteral jets not detected.        Poorly characterized on transabdominal imaging is a large right adnexal mass with diffuse low-level internal echoes measuring 9.3 x 6.2 x 7.0, likely corresponding to large right adnexal cyst seen on CT dated 3/9/2023 where it measured approximately 9.8   cm in maximal dimension. Given low-level internal echoes, complex cyst cannot be excluded on current ultrasound.     Heterogeneous myomatous uterus is partially visualized.     On image 38 of series 1 incidental note is made of a 1.6 cm rounded echogenic hepatic mass, possible hepatic hemangioma but incompletely characterized on ultrasound.     IMPRESSION:     1. 1.6 cm indeterminant echogenic hepatic mass, possible hemangioma but incompletely characterized on ultrasound. Exact correlation with prior CT is difficult. This finding was not definitively visualized on prior ultrasound. This finding can be further    characterized with dynamic phase contrast-enhanced MRI of the abdomen as clinically indicated.     2. 9.3 cm right adnexal cystic mass which cannot be characterized as a simple cyst on current ultrasound. Patient has a known large right adnexal cyst seen on prior imaging. Consider repeat pelvic ultrasound or further evaluation with contrast-enhanced   MRI of the pelvis to exclude developing cystic neoplasm.     3. Right-sided nonobstructive nephrolithiasis. Bilateral renal cysts. Mild fullness to the right renal collecting system.     4. Nonvisualization of the ureteral jets. Evaluation of the urinary bladder is limited secondary to suboptimal distention.    PROCEDURE:     Recent Results (from the past 2 hour(s))   POCT urine dip    Collection Time: 05/16/24 10:37 AM   Result Value Ref Range    LEUKOCYTE ESTERASE,UA -     NITRITE,UA -     SL AMB POCT UROBILINOGEN 0.2     POCT URINE PROTEIN -      PH,UA 6.0     BLOOD,UA trace     SPECIFIC GRAVITY,UA 1.005     KETONES,UA -     BILIRUBIN,UA -     GLUCOSE, UA -      COLOR,UA yellow     CLARITY,UA clear    ]

## 2024-06-14 ENCOUNTER — OFFICE VISIT (OUTPATIENT)
Dept: FAMILY MEDICINE CLINIC | Facility: CLINIC | Age: 69
End: 2024-06-14
Payer: COMMERCIAL

## 2024-06-14 VITALS
OXYGEN SATURATION: 98 % | TEMPERATURE: 97.3 F | HEIGHT: 65 IN | WEIGHT: 135.2 LBS | SYSTOLIC BLOOD PRESSURE: 129 MMHG | BODY MASS INDEX: 22.53 KG/M2 | DIASTOLIC BLOOD PRESSURE: 77 MMHG | HEART RATE: 87 BPM

## 2024-06-14 DIAGNOSIS — R19.4 CHANGE IN BOWEL HABITS: ICD-10-CM

## 2024-06-14 DIAGNOSIS — R19.7 DIARRHEA, UNSPECIFIED TYPE: Primary | ICD-10-CM

## 2024-06-14 PROCEDURE — G2211 COMPLEX E/M VISIT ADD ON: HCPCS | Performed by: FAMILY MEDICINE

## 2024-06-14 PROCEDURE — 99213 OFFICE O/P EST LOW 20 MIN: CPT | Performed by: FAMILY MEDICINE

## 2024-06-14 NOTE — PROGRESS NOTES
Ambulatory Visit  Name: Mookie Muhammad      : 1955      MRN: [id number]  Encounter Provider: Jase García DO  Encounter Date: 2024   Encounter department: [facility]    Assessment & Plan   1. Diarrhea, unspecified type  Assessment & Plan:  Infectious colitis and C. difficile need to be ruled out.  I am particularly concerned about C. difficile since the patient has taken multiple courses of antibiotics for frequent urinary tract infections.  The patient was also hospitalized for her hysterectomy.  I ordered a stool culture as well as O&P and stool for C. difficile.  I will make further recommendations to the patient when I get these results.  Orders:  -     Stool Enteric Bacterial Panel by PCR; Future  -     Clostridium difficile toxin by PCR with EIA; Future  -     Ova and parasite examination; Future  2. Change in bowel habits  Assessment & Plan:  Patient's last colonoscopy was .  Given her change in bowel habits, I think GI consultation is indicated and a repeat colonoscopy should be considered  Orders:  -     Ambulatory Referral to Gastroenterology; Future       History of Present Illness     This is a 68-year-old white female who presents to the office today complaining of diarrhea.  She tells me after her hysterectomy, she was told to take Colace.  About a month ago, she got constipated and took Colace.  She tells me that ever since then, she has been having diarrhea.  She feels like she does not empty out all the way and she has been having loose stools and diarrhea every day, worse in the mornings.  She denies any blood in her stools.  Denies any fever or chills.  She has been on several courses of antibiotics due to frequent urinary tract infections.        Review of Systems   Constitutional:  Negative for chills and fever.   Respiratory:  Negative for cough, shortness of breath and wheezing.    Cardiovascular:  Negative for chest pain, palpitations and leg  "swelling.   Gastrointestinal:  Positive for abdominal distention and diarrhea. Negative for abdominal pain, anal bleeding, blood in stool, constipation, nausea and vomiting.       Objective     /77   Pulse 87   Temp (!) 97.3 °F (36.3 °C) (Tympanic)   Ht 5' 5\" (1.651 m)   Wt 61.3 kg (135 lb 3.2 oz)   LMP  (LMP Unknown)   SpO2 98%   BMI 22.50 kg/m²     Physical Exam  Vitals reviewed.   Constitutional:       Comments: This is a 68-year-old white female who appears her stated age.  Patient is nonseptic in appearance and in no apparent distress.   HENT:      Head: Normocephalic and atraumatic.      Right Ear: Tympanic membrane, ear canal and external ear normal. There is no impacted cerumen.      Left Ear: Tympanic membrane, ear canal and external ear normal. There is no impacted cerumen.      Mouth/Throat:      Mouth: Mucous membranes are moist.      Pharynx: Oropharynx is clear. No oropharyngeal exudate or posterior oropharyngeal erythema.   Eyes:      General: No scleral icterus.        Right eye: No discharge.         Left eye: No discharge.      Conjunctiva/sclera: Conjunctivae normal.      Pupils: Pupils are equal, round, and reactive to light.   Cardiovascular:      Rate and Rhythm: Normal rate and regular rhythm.      Heart sounds: Normal heart sounds. No murmur heard.     No friction rub. No gallop.   Pulmonary:      Effort: Pulmonary effort is normal. No respiratory distress.      Breath sounds: Normal breath sounds. No stridor. No wheezing, rhonchi or rales.   Abdominal:      General: Bowel sounds are normal. There is no distension.      Palpations: Abdomen is soft. There is no mass.      Tenderness: There is no abdominal tenderness. There is no guarding.   Musculoskeletal:      Cervical back: Neck supple.   Lymphadenopathy:      Cervical: No cervical adenopathy.   Psychiatric:         Mood and Affect: Mood normal.         Behavior: Behavior normal.         Thought Content: Thought content normal. "         Judgment: Judgment normal.     Extremities:  Without cyanosis, clubbing, or edema    Administrative Statements

## 2024-06-17 ENCOUNTER — APPOINTMENT (OUTPATIENT)
Dept: LAB | Facility: CLINIC | Age: 69
End: 2024-06-17
Payer: COMMERCIAL

## 2024-06-17 DIAGNOSIS — R19.7 DIARRHEA, UNSPECIFIED TYPE: ICD-10-CM

## 2024-06-17 PROCEDURE — 87493 C DIFF AMPLIFIED PROBE: CPT

## 2024-06-17 PROCEDURE — 87177 OVA AND PARASITES SMEARS: CPT

## 2024-06-17 PROCEDURE — 87505 NFCT AGENT DETECTION GI: CPT

## 2024-06-17 PROCEDURE — 87209 SMEAR COMPLEX STAIN: CPT

## 2024-06-18 ENCOUNTER — TELEPHONE (OUTPATIENT)
Age: 69
End: 2024-06-18

## 2024-06-18 DIAGNOSIS — A04.72 C. DIFFICILE COLITIS: Primary | ICD-10-CM

## 2024-06-18 LAB
C COLI+JEJUNI TUF STL QL NAA+PROBE: NEGATIVE
C DIFF TOX A+B STL QL IA: NEGATIVE
C DIFF TOX GENS STL QL NAA+PROBE: POSITIVE
EC STX1+STX2 GENES STL QL NAA+PROBE: NEGATIVE
SALMONELLA SP SPAO STL QL NAA+PROBE: NEGATIVE
SHIGELLA SP+EIEC IPAH STL QL NAA+PROBE: NEGATIVE

## 2024-06-18 RX ORDER — METRONIDAZOLE 500 MG/1
500 TABLET ORAL 3 TIMES DAILY
Qty: 30 TABLET | Refills: 0 | Status: SHIPPED | OUTPATIENT
Start: 2024-06-18 | End: 2024-06-28

## 2024-06-18 NOTE — PROGRESS NOTES
I contacted the patient today.  I got the results of her stool culture as well as her C. difficile antigen.  Patient did test positive for C. difficile.  Not going to treat her.  I started the patient on Flagyl 500 mg.  She will take 1 tablet 3 times per day for 10 days.  She was advised no alcohol at all while on this medication.  I also recommended to the patient that she take a probiotic daily.  Patient was advised to call if no improvement or worsening.

## 2024-06-19 PROBLEM — R19.7 DIARRHEA: Status: ACTIVE | Noted: 2024-06-19

## 2024-06-19 PROBLEM — R19.4 CHANGE IN BOWEL HABITS: Status: ACTIVE | Noted: 2024-06-19

## 2024-06-19 NOTE — ASSESSMENT & PLAN NOTE
Patient's last colonoscopy was 2016.  Given her change in bowel habits, I think GI consultation is indicated and a repeat colonoscopy should be considered

## 2024-06-19 NOTE — ASSESSMENT & PLAN NOTE
Infectious colitis and C. difficile need to be ruled out.  I am particularly concerned about C. difficile since the patient has taken multiple courses of antibiotics for frequent urinary tract infections.  The patient was also hospitalized for her hysterectomy.  I ordered a stool culture as well as O&P and stool for C. difficile.  I will make further recommendations to the patient when I get these results.

## 2024-07-17 ENCOUNTER — VBI (OUTPATIENT)
Dept: ADMINISTRATIVE | Facility: OTHER | Age: 69
End: 2024-07-17

## 2024-07-17 NOTE — TELEPHONE ENCOUNTER
07/17/24 3:22 PM     Chart reviewed for Mammogram ; nothing is submitted to the patient's insurance at this time.     Funmi Rivers   [facility]

## 2024-09-06 ENCOUNTER — OFFICE VISIT (OUTPATIENT)
Dept: FAMILY MEDICINE CLINIC | Facility: CLINIC | Age: 69
End: 2024-09-06
Payer: COMMERCIAL

## 2024-09-06 VITALS
DIASTOLIC BLOOD PRESSURE: 66 MMHG | BODY MASS INDEX: 22.33 KG/M2 | OXYGEN SATURATION: 98 % | HEART RATE: 96 BPM | TEMPERATURE: 96.4 F | WEIGHT: 134 LBS | SYSTOLIC BLOOD PRESSURE: 121 MMHG | HEIGHT: 65 IN

## 2024-09-06 DIAGNOSIS — I10 ESSENTIAL HYPERTENSION, BENIGN: Primary | ICD-10-CM

## 2024-09-06 DIAGNOSIS — E78.00 PURE HYPERCHOLESTEROLEMIA: ICD-10-CM

## 2024-09-06 DIAGNOSIS — Z79.899 ENCOUNTER FOR LONG-TERM (CURRENT) USE OF MEDICATIONS: ICD-10-CM

## 2024-09-06 DIAGNOSIS — R73.01 IMPAIRED FASTING GLUCOSE: ICD-10-CM

## 2024-09-06 DIAGNOSIS — N30.00 ACUTE CYSTITIS WITHOUT HEMATURIA: ICD-10-CM

## 2024-09-06 LAB
BACTERIA UR QL AUTO: ABNORMAL /HPF
BILIRUB UR QL STRIP: NEGATIVE
CLARITY UR: CLEAR
COLOR UR: ABNORMAL
GLUCOSE UR STRIP-MCNC: NEGATIVE MG/DL
HGB UR QL STRIP.AUTO: ABNORMAL
KETONES UR STRIP-MCNC: NEGATIVE MG/DL
LEUKOCYTE ESTERASE UR QL STRIP: ABNORMAL
MUCOUS THREADS UR QL AUTO: ABNORMAL
NITRITE UR QL STRIP: NEGATIVE
NON-SQ EPI CELLS URNS QL MICRO: ABNORMAL /HPF
PH UR STRIP.AUTO: 5.5 [PH]
PROT UR STRIP-MCNC: NEGATIVE MG/DL
RBC #/AREA URNS AUTO: ABNORMAL /HPF
SP GR UR STRIP.AUTO: 1.01 (ref 1–1.03)
UROBILINOGEN UR STRIP-ACNC: <2 MG/DL
WBC #/AREA URNS AUTO: ABNORMAL /HPF
WBC CLUMPS # UR AUTO: PRESENT /UL

## 2024-09-06 PROCEDURE — 87077 CULTURE AEROBIC IDENTIFY: CPT | Performed by: FAMILY MEDICINE

## 2024-09-06 PROCEDURE — 81001 URINALYSIS AUTO W/SCOPE: CPT | Performed by: FAMILY MEDICINE

## 2024-09-06 PROCEDURE — 87086 URINE CULTURE/COLONY COUNT: CPT | Performed by: FAMILY MEDICINE

## 2024-09-06 PROCEDURE — 99214 OFFICE O/P EST MOD 30 MIN: CPT | Performed by: FAMILY MEDICINE

## 2024-09-06 PROCEDURE — 87186 SC STD MICRODIL/AGAR DIL: CPT | Performed by: FAMILY MEDICINE

## 2024-09-06 PROCEDURE — G2211 COMPLEX E/M VISIT ADD ON: HCPCS | Performed by: FAMILY MEDICINE

## 2024-09-06 RX ORDER — BACILLUS COAGULANS/INULIN 1B-250 MG
1 CAPSULE ORAL DAILY
COMMUNITY

## 2024-09-06 RX ORDER — DOXYCYCLINE 100 MG/1
100 CAPSULE ORAL 2 TIMES DAILY
Qty: 14 CAPSULE | Refills: 0 | Status: SHIPPED | OUTPATIENT
Start: 2024-09-06 | End: 2024-09-13

## 2024-09-06 RX ORDER — VALSARTAN 80 MG/1
80 TABLET ORAL DAILY
Qty: 90 TABLET | Refills: 1 | Status: SHIPPED | OUTPATIENT
Start: 2024-09-06

## 2024-09-06 NOTE — PROGRESS NOTES
Ambulatory Visit  Name: Mookie Muhammad      : 1955      MRN: [id number]  Encounter Provider: Jase García DO  Encounter Date: 2024   Encounter department: [facility] beds at the [facility]    Assessment & Plan   1. Essential hypertension, benign  Assessment & Plan:  I rechecked the patient's blood pressure myself today and found her blood pressure to be 100/60.  I reviewed her last blood pressure reading in my office as well as when she saw her urologist last.  I told the patient to hold her valsartan tomorrow.  She already took it today.  She should drink plenty of fluids.  On , we will start a reduced dose of valsartan.  She will take 80 mg daily.  I am going to schedule a follow-up visit in approximately 6 weeks.  I will have the patient continue to monitor her blood pressures in the interim.  Orders:  -     valsartan (DIOVAN) 80 mg tablet; Take 1 tablet (80 mg total) by mouth daily  2. Acute cystitis without hematuria  Assessment & Plan:  Patient has an acute cystitis.  My concern is that the patient had C. difficile several months ago.  I am concerned she is going to have a reoccurrence.  Unfortunately, she has multiple allergies to common medications used to treat urinary tract infections.  She is allergic to sulfa, Cipro, and Macrobid.  I would have preferred to prescribe Macrobid for her as this is least likely to cause C. difficile.  However, she is allergic to it.  I started her on doxycycline 100 mg.  She will take 1 capsule twice a day for 7 days.  A UA C&S was obtained.  I will make further recommendations to the patient when I get the results.  Orders:  -     UA (URINE) with reflex to Scope; Future  -     Urine culture; Future  -     doxycycline hyclate (VIBRAMYCIN) 100 mg capsule; Take 1 capsule (100 mg total) by mouth 2 (two) times a day for 7 days  -     UA (URINE) with reflex to Scope  -     Urine culture  3. Pure hypercholesterolemia  Assessment & Plan:  A CMP and fasting  "lipid panel were ordered.  The patient's goal LDL cholesterol is less than 100.  Orders:  -     Lipid Panel with Direct LDL reflex; Future  -     Comprehensive metabolic panel; Future  4. Impaired fasting glucose  -     Hemoglobin A1C  5. Encounter for long-term (current) use of medications  -     CBC and differential; Future       History of Present Illness     This is a 69-year-old white female who presents to the office today for her routine checkup.  The patient has several complaints today.  First, she complains of a urinary tract infection.  She tells me it began 2 days ago.  She complains of frequency of urination.  She tells me when she does go, only little comes out.  She reports that urine has a foul odor.  She has some dysuria.  She denies any frequency of urination.  Secondly, she complains that on Wednesday and Thursday she was feeling very dizzy.  She was checking her blood pressures at home and found them to be consistently low.  Systolic blood pressure was consistently less than 100.  Patient tells me she has not had a cigarette now in 2 weeks.  I encouraged the patient to continue to refrain from smoking.        Review of Systems   Constitutional:  Negative for chills and fever.   Cardiovascular:  Negative for chest pain, palpitations and leg swelling.   Gastrointestinal:  Negative for abdominal distention, abdominal pain, blood in stool, constipation and diarrhea.   Genitourinary:  Positive for dysuria and frequency. Negative for hematuria.   Musculoskeletal:  Positive for arthralgias (Positive for right hip pain).   Neurological:  Positive for dizziness.       Objective     /66   Pulse 96   Temp (!) 96.4 °F (35.8 °C) (Tympanic)   Ht 5' 5\" (1.651 m)   Wt 60.8 kg (134 lb)   LMP  (LMP Unknown)   SpO2 98%   BMI 22.30 kg/m²     Physical Exam  Vitals reviewed.   Constitutional:       Comments: The patient is a 69-year-old white female who appears her stated age.  The patient is nonseptic in " appearance and in no apparent distress   HENT:      Head: Normocephalic and atraumatic.      Right Ear: Tympanic membrane, ear canal and external ear normal. There is no impacted cerumen.      Left Ear: Tympanic membrane, ear canal and external ear normal. There is no impacted cerumen.      Mouth/Throat:      Mouth: Mucous membranes are moist.      Pharynx: Oropharynx is clear. No oropharyngeal exudate or posterior oropharyngeal erythema.   Eyes:      General: No scleral icterus.        Right eye: No discharge.         Left eye: No discharge.      Conjunctiva/sclera: Conjunctivae normal.      Pupils: Pupils are equal, round, and reactive to light.   Neck:      Comments: There is no thyromegaly  Cardiovascular:      Rate and Rhythm: Normal rate and regular rhythm.      Heart sounds: Normal heart sounds. No murmur heard.     No friction rub. No gallop.   Pulmonary:      Effort: Pulmonary effort is normal. No respiratory distress.      Breath sounds: Normal breath sounds. No stridor. No wheezing, rhonchi or rales.   Abdominal:      General: Bowel sounds are normal. There is no distension.      Palpations: Abdomen is soft. There is no mass.      Tenderness: There is no abdominal tenderness. There is no right CVA tenderness, left CVA tenderness or guarding.   Musculoskeletal:      Cervical back: Neck supple.   Lymphadenopathy:      Cervical: No cervical adenopathy.   Psychiatric:         Mood and Affect: Mood normal.         Behavior: Behavior normal.         Thought Content: Thought content normal.         Judgment: Judgment normal.       Administrative Statements

## 2024-09-06 NOTE — ASSESSMENT & PLAN NOTE
Patient has an acute cystitis.  My concern is that the patient had C. difficile several months ago.  I am concerned she is going to have a reoccurrence.  Unfortunately, she has multiple allergies to common medications used to treat urinary tract infections.  She is allergic to sulfa, Cipro, and Macrobid.  I would have preferred to prescribe Macrobid for her as this is least likely to cause C. difficile.  However, she is allergic to it.  I started her on doxycycline 100 mg.  She will take 1 capsule twice a day for 7 days.  A UA C&S was obtained.  I will make further recommendations to the patient when I get the results.

## 2024-09-06 NOTE — ASSESSMENT & PLAN NOTE
I rechecked the patient's blood pressure myself today and found her blood pressure to be 100/60.  I reviewed her last blood pressure reading in my office as well as when she saw her urologist last.  I told the patient to hold her valsartan tomorrow.  She already took it today.  She should drink plenty of fluids.  On Sunday, we will start a reduced dose of valsartan.  She will take 80 mg daily.  I am going to schedule a follow-up visit in approximately 6 weeks.  I will have the patient continue to monitor her blood pressures in the interim.

## 2024-09-08 LAB
BACTERIA UR CULT: ABNORMAL
BACTERIA UR CULT: ABNORMAL

## 2024-09-09 ENCOUNTER — TELEPHONE (OUTPATIENT)
Dept: FAMILY MEDICINE CLINIC | Facility: CLINIC | Age: 69
End: 2024-09-09

## 2024-09-09 DIAGNOSIS — N30.00 ACUTE CYSTITIS WITHOUT HEMATURIA: Primary | ICD-10-CM

## 2024-09-09 RX ORDER — CEFUROXIME AXETIL 500 MG/1
500 TABLET ORAL 2 TIMES DAILY
Qty: 14 TABLET | Refills: 0 | Status: SHIPPED | OUTPATIENT
Start: 2024-09-09 | End: 2024-09-16

## 2024-09-09 NOTE — TELEPHONE ENCOUNTER
I would hold the medication tomorrow, then try taking one tablet every other day and continue to monitor her blood pressure.

## 2024-09-09 NOTE — TELEPHONE ENCOUNTER
Mookie came in and wanted to know if you wanted her to continue with her Valsartan 80mg since her blood pressure still has been running low or do you want her to continue with it and see how it goes for the rest of the week    Saturday when she checked it 98/74 Monday 100/70      Please advise!

## 2024-09-18 ENCOUNTER — OFFICE VISIT (OUTPATIENT)
Dept: FAMILY MEDICINE CLINIC | Facility: CLINIC | Age: 69
End: 2024-09-18
Payer: COMMERCIAL

## 2024-09-18 VITALS
BODY MASS INDEX: 22.69 KG/M2 | SYSTOLIC BLOOD PRESSURE: 135 MMHG | DIASTOLIC BLOOD PRESSURE: 72 MMHG | TEMPERATURE: 97.2 F | OXYGEN SATURATION: 99 % | HEIGHT: 65 IN | HEART RATE: 100 BPM | WEIGHT: 136.2 LBS

## 2024-09-18 DIAGNOSIS — N30.00 ACUTE CYSTITIS WITHOUT HEMATURIA: Primary | ICD-10-CM

## 2024-09-18 DIAGNOSIS — I10 ESSENTIAL HYPERTENSION, BENIGN: ICD-10-CM

## 2024-09-18 LAB
BACTERIA UR QL AUTO: ABNORMAL /HPF
BILIRUB UR QL STRIP: NEGATIVE
CAOX CRY URNS QL MICRO: ABNORMAL /HPF
CLARITY UR: ABNORMAL
COLOR UR: ABNORMAL
GLUCOSE UR STRIP-MCNC: NEGATIVE MG/DL
HGB UR QL STRIP.AUTO: ABNORMAL
HYALINE CASTS #/AREA URNS LPF: ABNORMAL /LPF
KETONES UR STRIP-MCNC: NEGATIVE MG/DL
LEUKOCYTE ESTERASE UR QL STRIP: ABNORMAL
MUCOUS THREADS UR QL AUTO: ABNORMAL
NITRITE UR QL STRIP: NEGATIVE
NON-SQ EPI CELLS URNS QL MICRO: ABNORMAL /HPF
PH UR STRIP.AUTO: 5.5 [PH]
PROT UR STRIP-MCNC: ABNORMAL MG/DL
RBC #/AREA URNS AUTO: ABNORMAL /HPF
SP GR UR STRIP.AUTO: 1.02 (ref 1–1.03)
UROBILINOGEN UR STRIP-ACNC: <2 MG/DL
WBC #/AREA URNS AUTO: ABNORMAL /HPF

## 2024-09-18 PROCEDURE — 87186 SC STD MICRODIL/AGAR DIL: CPT | Performed by: FAMILY MEDICINE

## 2024-09-18 PROCEDURE — 81001 URINALYSIS AUTO W/SCOPE: CPT | Performed by: FAMILY MEDICINE

## 2024-09-18 PROCEDURE — G2211 COMPLEX E/M VISIT ADD ON: HCPCS | Performed by: FAMILY MEDICINE

## 2024-09-18 PROCEDURE — 87086 URINE CULTURE/COLONY COUNT: CPT | Performed by: FAMILY MEDICINE

## 2024-09-18 PROCEDURE — 87077 CULTURE AEROBIC IDENTIFY: CPT | Performed by: FAMILY MEDICINE

## 2024-09-18 PROCEDURE — 99214 OFFICE O/P EST MOD 30 MIN: CPT | Performed by: FAMILY MEDICINE

## 2024-09-18 NOTE — ASSESSMENT & PLAN NOTE
A urinalysis as well as a urine culture were obtained.  I did not prescribe an antibiotic for the patient.  Going to await results of her culture.  Patient does have a history of C. difficile and unless antibiotics are warranted, she is not going to be prescribed an antibiotic.  For now, the patient is going to drink plenty of fluids and continue her probiotics.  I will contact her with urine culture results.  She should notify me if she starts running a fever in the interim.  Orders:    Urine culture; Future    UA (URINE) with reflex to Scope; Future    Urine culture    UA (URINE) with reflex to Scope

## 2024-09-18 NOTE — PROGRESS NOTES
Ambulatory Visit  Name: Mookie Muhammad      : 1955      MRN: [id number]  Encounter Provider: Jase García DO  Encounter Date: 2024   Encounter department: [facility] PRIMARY CARE    Assessment & Plan  Acute cystitis without hematuria  A urinalysis as well as a urine culture were obtained.  I did not prescribe an antibiotic for the patient.  Going to await results of her culture.  Patient does have a history of C. difficile and unless antibiotics are warranted, she is not going to be prescribed an antibiotic.  For now, the patient is going to drink plenty of fluids and continue her probiotics.  I will contact her with urine culture results.  She should notify me if she starts running a fever in the interim.  Orders:    Urine culture; Future    UA (URINE) with reflex to Scope; Future    Urine culture    UA (URINE) with reflex to Scope    Essential hypertension, benign  I reviewed the patient's ambulatory blood pressures.  It looks like on the days that she skips, the following morning her blood pressure is higher.  Systolic blood pressures are often in the high 130s.  As such, going to have her try taking the valsartan 80 mg every day.  She will continue to keep a logbook of her blood pressures and bring this with her when she returns.            History of Present Illness     This is a 69-year-old white female who presents to the office today stating that she does not think her urinary tract infection is gone.  She reports that she has frequency and chills.  She denies any fever.  She tells me last night she developed right flank pain.  She tells me that she does not have flank pain today but she is getting frequency and when she urinates, only a little bit comes out.  She did complete the course of Ceftin and doxycycline as instructed.  She has not had any diarrhea.  She remains on probiotics.  Patient also brought with her a logbook of her blood pressures.  She is currently taking Diovan  "80 mg every other day.  She has marked which day she takes the medication which days she does not.          Review of Systems   Constitutional:  Positive for chills. Negative for fever.   Gastrointestinal:  Negative for abdominal pain and diarrhea.   Genitourinary:  Positive for dysuria, flank pain and frequency. Negative for hematuria.   Neurological:  Negative for dizziness and light-headedness.           Objective     /72   Pulse 100   Temp (!) 97.2 °F (36.2 °C) (Tympanic)   Ht 5' 5\" (1.651 m)   Wt 61.8 kg (136 lb 3.2 oz)   LMP  (LMP Unknown)   SpO2 99%   BMI 22.66 kg/m²     Physical Exam  Vitals reviewed.   Constitutional:       Comments: Patient is a 69-year-old white female who appears her stated age.  She is nonseptic in appearance and in no apparent distress   HENT:      Head: Normocephalic and atraumatic.      Right Ear: Tympanic membrane, ear canal and external ear normal. There is no impacted cerumen.      Left Ear: Tympanic membrane, ear canal and external ear normal. There is no impacted cerumen.      Mouth/Throat:      Mouth: Mucous membranes are moist.      Pharynx: Oropharynx is clear. No oropharyngeal exudate or posterior oropharyngeal erythema.   Eyes:      General: No scleral icterus.        Right eye: No discharge.         Left eye: No discharge.      Conjunctiva/sclera: Conjunctivae normal.      Pupils: Pupils are equal, round, and reactive to light.   Cardiovascular:      Rate and Rhythm: Normal rate and regular rhythm.      Heart sounds: Normal heart sounds. No murmur heard.     No friction rub. No gallop.   Pulmonary:      Effort: Pulmonary effort is normal. No respiratory distress.      Breath sounds: Normal breath sounds. No stridor. No wheezing, rhonchi or rales.   Abdominal:      General: Bowel sounds are normal. There is no distension.      Palpations: Abdomen is soft. There is no mass.      Tenderness: There is no abdominal tenderness. There is right CVA tenderness. There " is no left CVA tenderness or guarding.   Musculoskeletal:      Cervical back: Neck supple.   Lymphadenopathy:      Cervical: No cervical adenopathy.   Psychiatric:         Mood and Affect: Mood normal.         Behavior: Behavior normal.         Thought Content: Thought content normal.         Judgment: Judgment normal.

## 2024-09-18 NOTE — ASSESSMENT & PLAN NOTE
I reviewed the patient's ambulatory blood pressures.  It looks like on the days that she skips, the following morning her blood pressure is higher.  Systolic blood pressures are often in the high 130s.  As such, going to have her try taking the valsartan 80 mg every day.  She will continue to keep a logbook of her blood pressures and bring this with her when she returns.

## 2024-09-20 DIAGNOSIS — N30.00 ACUTE CYSTITIS WITHOUT HEMATURIA: Primary | ICD-10-CM

## 2024-09-20 RX ORDER — CEFDINIR 300 MG/1
300 CAPSULE ORAL 2 TIMES DAILY
Qty: 14 CAPSULE | Refills: 0 | Status: SHIPPED | OUTPATIENT
Start: 2024-09-20 | End: 2024-09-26

## 2024-09-20 NOTE — PROGRESS NOTES
I called the patient.  Preliminary urine culture is positive for greater than 100,000 gram-negative rods.  I probably will not have her final urine culture until the weekend.  I do not want her to wait until Monday.  As such, going to start her on antibiotic therapy.  I plan to start her on levofloxacin.  However, when I reviewed her chart, she is allergic to Cipro, nitrofurantoin, and sulfa.  There is no evidence most of the common medications we would use for a urinary tract infection.  As such, going to start her on an extended third-generation cephalosporin.  Patient's insurance did not cover cefpodoxime.  As such, she was given cefdinir 300 mg twice a day for 7 days.  I will contact her on Monday with her final urine culture results.

## 2024-09-21 LAB
BACTERIA UR CULT: ABNORMAL
BACTERIA UR CULT: ABNORMAL

## 2024-09-23 ENCOUNTER — TELEPHONE (OUTPATIENT)
Dept: FAMILY MEDICINE CLINIC | Facility: CLINIC | Age: 69
End: 2024-09-23

## 2024-09-23 NOTE — TELEPHONE ENCOUNTER
----- Message from Jase García DO sent at 9/23/2024 10:04 AM EDT -----  Antibiotic that I gave her should work for this infection. I would recommend she consider seeing an allergist to be desensitized to the medications that she is allergic to. It is getting difficult to treat her due to her allergies.

## 2024-09-24 ENCOUNTER — HOSPITAL ENCOUNTER (EMERGENCY)
Facility: HOSPITAL | Age: 69
Discharge: STILL ADMITTED OR ADMINISTRATIVE | End: 2024-09-24
Attending: EMERGENCY MEDICINE
Payer: COMMERCIAL

## 2024-09-24 ENCOUNTER — HOSPITAL ENCOUNTER (INPATIENT)
Facility: HOSPITAL | Age: 69
LOS: 2 days | Discharge: HOME | End: 2024-09-26
Attending: FAMILY MEDICINE | Admitting: INTERNAL MEDICINE
Payer: COMMERCIAL

## 2024-09-24 ENCOUNTER — APPOINTMENT (EMERGENCY)
Dept: CT IMAGING | Facility: HOSPITAL | Age: 69
End: 2024-09-24
Payer: COMMERCIAL

## 2024-09-24 VITALS
RESPIRATION RATE: 16 BRPM | SYSTOLIC BLOOD PRESSURE: 144 MMHG | BODY MASS INDEX: 22.96 KG/M2 | TEMPERATURE: 97.7 F | WEIGHT: 138 LBS | OXYGEN SATURATION: 97 % | HEART RATE: 81 BPM | DIASTOLIC BLOOD PRESSURE: 73 MMHG

## 2024-09-24 DIAGNOSIS — R51.9 HEADACHE: ICD-10-CM

## 2024-09-24 DIAGNOSIS — N13.2 HYDRONEPHROSIS WITH URINARY OBSTRUCTION DUE TO URETERAL CALCULUS: Primary | ICD-10-CM

## 2024-09-24 DIAGNOSIS — N20.0 KIDNEY STONES: ICD-10-CM

## 2024-09-24 DIAGNOSIS — N39.0 UTI (URINARY TRACT INFECTION): ICD-10-CM

## 2024-09-24 DIAGNOSIS — N13.2 URETERAL STONE WITH HYDRONEPHROSIS: Primary | ICD-10-CM

## 2024-09-24 DIAGNOSIS — I10 ESSENTIAL HYPERTENSION, BENIGN: ICD-10-CM

## 2024-09-24 DIAGNOSIS — N20.0 NEPHROLITHIASIS: ICD-10-CM

## 2024-09-24 LAB
ALBUMIN SERPL BCG-MCNC: 4.1 G/DL (ref 3.5–5)
ALP SERPL-CCNC: 44 U/L (ref 34–104)
ALT SERPL W P-5'-P-CCNC: 10 U/L (ref 7–52)
ANION GAP SERPL CALCULATED.3IONS-SCNC: 9 MMOL/L (ref 4–13)
AST SERPL W P-5'-P-CCNC: 15 U/L (ref 13–39)
BACTERIA UR QL AUTO: NORMAL /HPF
BASOPHILS # BLD AUTO: 0.03 THOUSANDS/ΜL (ref 0–0.1)
BASOPHILS NFR BLD AUTO: 0 % (ref 0–1)
BILIRUB SERPL-MCNC: 0.5 MG/DL (ref 0.2–1)
BILIRUB UR QL STRIP: NEGATIVE
BUN SERPL-MCNC: 24 MG/DL (ref 5–25)
CALCIUM SERPL-MCNC: 9.3 MG/DL (ref 8.4–10.2)
CHLORIDE SERPL-SCNC: 101 MMOL/L (ref 96–108)
CLARITY UR: CLEAR
CO2 SERPL-SCNC: 26 MMOL/L (ref 21–32)
COLOR UR: YELLOW
CREAT SERPL-MCNC: 1.05 MG/DL (ref 0.6–1.3)
EOSINOPHIL # BLD AUTO: 0.02 THOUSAND/ΜL (ref 0–0.61)
EOSINOPHIL NFR BLD AUTO: 0 % (ref 0–6)
ERYTHROCYTE [DISTWIDTH] IN BLOOD BY AUTOMATED COUNT: 12.3 % (ref 11.6–15.1)
GFR SERPL CREATININE-BSD FRML MDRD: 54 ML/MIN/1.73SQ M
GLUCOSE SERPL-MCNC: 107 MG/DL (ref 65–140)
GLUCOSE UR STRIP-MCNC: NEGATIVE MG/DL
HCT VFR BLD AUTO: 37.1 % (ref 34.8–46.1)
HGB BLD-MCNC: 12.3 G/DL (ref 11.5–15.4)
HGB UR QL STRIP.AUTO: ABNORMAL
IMM GRANULOCYTES # BLD AUTO: 0.02 THOUSAND/UL (ref 0–0.2)
IMM GRANULOCYTES NFR BLD AUTO: 0 % (ref 0–2)
KETONES UR STRIP-MCNC: NEGATIVE MG/DL
LACTATE SERPL-SCNC: 0.9 MMOL/L (ref 0.5–2)
LEUKOCYTE ESTERASE UR QL STRIP: ABNORMAL
LIPASE SERPL-CCNC: 31 U/L (ref 11–82)
LYMPHOCYTES # BLD AUTO: 1.11 THOUSANDS/ΜL (ref 0.6–4.47)
LYMPHOCYTES NFR BLD AUTO: 12 % (ref 14–44)
MAGNESIUM SERPL-MCNC: 1.9 MG/DL (ref 1.9–2.7)
MCH RBC QN AUTO: 30.4 PG (ref 26.8–34.3)
MCHC RBC AUTO-ENTMCNC: 33.2 G/DL (ref 31.4–37.4)
MCV RBC AUTO: 92 FL (ref 82–98)
MONOCYTES # BLD AUTO: 0.78 THOUSAND/ΜL (ref 0.17–1.22)
MONOCYTES NFR BLD AUTO: 8 % (ref 4–12)
NEUTROPHILS # BLD AUTO: 7.52 THOUSANDS/ΜL (ref 1.85–7.62)
NEUTS SEG NFR BLD AUTO: 80 % (ref 43–75)
NITRITE UR QL STRIP: NEGATIVE
NON-SQ EPI CELLS URNS QL MICRO: NORMAL /HPF
NRBC BLD AUTO-RTO: 0 /100 WBCS
PH UR STRIP.AUTO: 6 [PH]
PLATELET # BLD AUTO: 301 THOUSANDS/UL (ref 149–390)
PMV BLD AUTO: 10.1 FL (ref 8.9–12.7)
POTASSIUM SERPL-SCNC: 3.7 MMOL/L (ref 3.5–5.3)
PROT SERPL-MCNC: 7.4 G/DL (ref 6.4–8.4)
PROT UR STRIP-MCNC: ABNORMAL MG/DL
RBC # BLD AUTO: 4.04 MILLION/UL (ref 3.81–5.12)
RBC #/AREA URNS AUTO: NORMAL /HPF
SODIUM SERPL-SCNC: 136 MMOL/L (ref 135–147)
SP GR UR STRIP.AUTO: 1.02 (ref 1–1.03)
UROBILINOGEN UR STRIP-ACNC: <2 MG/DL
WBC # BLD AUTO: 9.48 THOUSAND/UL (ref 4.31–10.16)
WBC #/AREA URNS AUTO: NORMAL /HPF

## 2024-09-24 PROCEDURE — 96375 TX/PRO/DX INJ NEW DRUG ADDON: CPT

## 2024-09-24 PROCEDURE — 99285 EMERGENCY DEPT VISIT HI MDM: CPT | Performed by: PHYSICIAN ASSISTANT

## 2024-09-24 PROCEDURE — 83735 ASSAY OF MAGNESIUM: CPT | Performed by: PHYSICIAN ASSISTANT

## 2024-09-24 PROCEDURE — 99284 EMERGENCY DEPT VISIT MOD MDM: CPT

## 2024-09-24 PROCEDURE — 36415 COLL VENOUS BLD VENIPUNCTURE: CPT | Performed by: PHYSICIAN ASSISTANT

## 2024-09-24 PROCEDURE — 81001 URINALYSIS AUTO W/SCOPE: CPT | Performed by: PHYSICIAN ASSISTANT

## 2024-09-24 PROCEDURE — 83605 ASSAY OF LACTIC ACID: CPT | Performed by: PHYSICIAN ASSISTANT

## 2024-09-24 PROCEDURE — 85025 COMPLETE CBC W/AUTO DIFF WBC: CPT | Performed by: PHYSICIAN ASSISTANT

## 2024-09-24 PROCEDURE — 83690 ASSAY OF LIPASE: CPT | Performed by: PHYSICIAN ASSISTANT

## 2024-09-24 PROCEDURE — 74177 CT ABD & PELVIS W/CONTRAST: CPT

## 2024-09-24 PROCEDURE — 96365 THER/PROPH/DIAG IV INF INIT: CPT

## 2024-09-24 PROCEDURE — 80053 COMPREHEN METABOLIC PANEL: CPT | Performed by: PHYSICIAN ASSISTANT

## 2024-09-24 RX ORDER — OXYCODONE HYDROCHLORIDE 5 MG/1
5 TABLET ORAL EVERY 4 HOURS PRN
Status: DISCONTINUED | OUTPATIENT
Start: 2024-09-24 | End: 2024-09-26 | Stop reason: HOSPADM

## 2024-09-24 RX ORDER — ACETAMINOPHEN 10 MG/ML
1000 INJECTION, SOLUTION INTRAVENOUS ONCE
Status: COMPLETED | OUTPATIENT
Start: 2024-09-24 | End: 2024-09-24

## 2024-09-24 RX ORDER — ONDANSETRON 2 MG/ML
4 INJECTION INTRAMUSCULAR; INTRAVENOUS ONCE
Status: COMPLETED | OUTPATIENT
Start: 2024-09-24 | End: 2024-09-24

## 2024-09-24 RX ORDER — ACETAMINOPHEN 325 MG/1
650 TABLET ORAL EVERY 6 HOURS PRN
Status: DISCONTINUED | OUTPATIENT
Start: 2024-09-24 | End: 2024-09-26 | Stop reason: HOSPADM

## 2024-09-24 RX ORDER — SODIUM CHLORIDE, SODIUM GLUCONATE, SODIUM ACETATE, POTASSIUM CHLORIDE, MAGNESIUM CHLORIDE, SODIUM PHOSPHATE, DIBASIC, AND POTASSIUM PHOSPHATE .53; .5; .37; .037; .03; .012; .00082 G/100ML; G/100ML; G/100ML; G/100ML; G/100ML; G/100ML; G/100ML
1000 INJECTION, SOLUTION INTRAVENOUS ONCE
Status: COMPLETED | OUTPATIENT
Start: 2024-09-24 | End: 2024-09-24

## 2024-09-24 RX ORDER — LORAZEPAM 0.5 MG/1
0.5 TABLET ORAL ONCE
Status: COMPLETED | OUTPATIENT
Start: 2024-09-24 | End: 2024-09-24

## 2024-09-24 RX ORDER — SODIUM CHLORIDE, SODIUM GLUCONATE, SODIUM ACETATE, POTASSIUM CHLORIDE, MAGNESIUM CHLORIDE, SODIUM PHOSPHATE, DIBASIC, AND POTASSIUM PHOSPHATE .53; .5; .37; .037; .03; .012; .00082 G/100ML; G/100ML; G/100ML; G/100ML; G/100ML; G/100ML; G/100ML
125 INJECTION, SOLUTION INTRAVENOUS CONTINUOUS
Status: DISPENSED | OUTPATIENT
Start: 2024-09-24 | End: 2024-09-26

## 2024-09-24 RX ORDER — KETOROLAC TROMETHAMINE 30 MG/ML
15 INJECTION, SOLUTION INTRAMUSCULAR; INTRAVENOUS ONCE
Status: COMPLETED | OUTPATIENT
Start: 2024-09-24 | End: 2024-09-24

## 2024-09-24 RX ORDER — HYDROMORPHONE HCL IN WATER/PF 6 MG/30 ML
0.2 PATIENT CONTROLLED ANALGESIA SYRINGE INTRAVENOUS EVERY 4 HOURS PRN
Status: DISCONTINUED | OUTPATIENT
Start: 2024-09-24 | End: 2024-09-26 | Stop reason: HOSPADM

## 2024-09-24 RX ORDER — ONDANSETRON 2 MG/ML
4 INJECTION INTRAMUSCULAR; INTRAVENOUS EVERY 6 HOURS PRN
Status: DISCONTINUED | OUTPATIENT
Start: 2024-09-24 | End: 2024-09-26 | Stop reason: HOSPADM

## 2024-09-24 RX ADMIN — SODIUM CHLORIDE, SODIUM GLUCONATE, SODIUM ACETATE, POTASSIUM CHLORIDE, MAGNESIUM CHLORIDE, SODIUM PHOSPHATE, DIBASIC, AND POTASSIUM PHOSPHATE 1000 ML: .53; .5; .37; .037; .03; .012; .00082 INJECTION, SOLUTION INTRAVENOUS at 13:34

## 2024-09-24 RX ADMIN — ACETAMINOPHEN 1000 MG: 1000 INJECTION, SOLUTION INTRAVENOUS at 13:34

## 2024-09-24 RX ADMIN — LORAZEPAM 0.5 MG: 0.5 TABLET ORAL at 19:51

## 2024-09-24 RX ADMIN — IOHEXOL 100 ML: 350 INJECTION, SOLUTION INTRAVENOUS at 15:46

## 2024-09-24 RX ADMIN — KETOROLAC TROMETHAMINE 15 MG: 30 INJECTION, SOLUTION INTRAMUSCULAR at 13:34

## 2024-09-24 RX ADMIN — ONDANSETRON 4 MG: 2 INJECTION INTRAMUSCULAR; INTRAVENOUS at 13:34

## 2024-09-24 NOTE — EMTALA/ACUTE CARE TRANSFER
[facility] EMERGENCY DEPARTMENT  [address] [facility] [address]  Dept: [phone]      EMTALA TRANSFER CONSENT    NAME Mookie Muhammad                                         1955                              MRN [id number]    I have been informed of my rights regarding examination, treatment, and transfer   by Dr. Jose Guadalupe Patel,     Benefits: Specialized equipment and/or services available at the receiving facility (Include comment)________________________    Risks: Potential for delay in receiving treatment, Potential deterioration of medical condition, Loss of IV, Increased discomfort during transfer, Possible worsening of condition or death during transfer      Consent for Transfer:  I acknowledge that my medical condition has been evaluated and explained to me by the emergency department physician or other qualified medical person and/or my attending physician, who has recommended that I be transferred to the service of  Accepting Physician: Dr. Dee at Accepting Facility Name, City & State : [facility]. The above potential benefits of such transfer, the potential risks associated with such transfer, and the probable risks of not being transferred have been explained to me, and I fully understand them.  The doctor has explained that, in my case, the benefits of transfer outweigh the risks.  I agree to be transferred.    I authorize the performance of emergency medical procedures and treatments upon me in both transit and upon arrival at the receiving facility.  Additionally, I authorize the release of any and all medical records to the receiving facility and request they be transported with me, if possible.  I understand that the safest mode of transportation during a medical emergency is an ambulance and that the Hospital advocates the use of this mode of transport. Risks of traveling to the receiving facility by car, including absence of medical control, life  sustaining equipment, such as oxygen, and medical personnel has been explained to me and I fully understand them.    (TAMARA CORRECT BOX BELOW)  [ x ]  I consent to the stated transfer and to be transported by ambulance/helicopter.  [  ]  I consent to the stated transfer, but refuse transportation by ambulance and accept full responsibility for my transportation by car.  I understand the risks of non-ambulance transfers and I exonerate the Hospital and its staff from any deterioration in my condition that results from this refusal.    X___________________________________________    DATE  24  TIME________  Signature of patient or legally responsible individual signing on patient behalf           RELATIONSHIP TO PATIENT_________________________          Provider Certification    NAME Mookie Muhammad                                         1955                              MRN [id number]    A medical screening exam was performed on the above named patient.  Based on the examination:    Condition Necessitating Transfer The primary encounter diagnosis was Ureteral stone with hydronephrosis. Diagnoses of Nephrolithiasis, Kidney stones, UTI (urinary tract infection), and Headache were also pertinent to this visit.    Patient Condition: The patient has been stabilized such that within reasonable medical probability, no material deterioration of the patient condition or the condition of the unborn child(ha) is likely to result from the transfer    Reason for Transfer: Level of Care needed not available at this facility    Transfer Requirements: Facility [facility]   Space available and qualified personnel available for treatment as acknowledged by PACS  Agreed to accept transfer and to provide appropriate medical treatment as acknowledged by       Dr. Dee  Appropriate medical records of the examination and treatment of the patient are provided at the time of transfer   STAFF INITIAL WHEN COMPLETED  _______  Transfer will be performed by qualified personnel from    and appropriate transfer equipment as required, including the use of necessary and appropriate life support measures.    Provider Certification: I have examined the patient and explained the following risks and benefits of being transferred/refusing transfer to the patient/family:  General risk, such as traffic hazards, adverse weather conditions, rough terrain or turbulence, possible failure of equipment (including vehicle or aircraft), or consequences of actions of persons outside the control of the transport personnel, Unanticipated needs of medical equipment and personnel during transport, Risk of worsening condition, The possibility of a transport vehicle being unavailable      Based on these reasonable risks and benefits to the patient and/or the unborn child(ha), and based upon the information available at the time of the patient’s examination, I certify that the medical benefits reasonably to be expected from the provision of appropriate medical treatments at another medical facility outweigh the increasing risks, if any, to the individual’s medical condition, and in the case of labor to the unborn child, from effecting the transfer.    X____________________________________________ DATE 09/24/24        TIME_______      ORIGINAL - SEND TO MEDICAL RECORDS   COPY - SEND WITH PATIENT DURING TRANSFER

## 2024-09-24 NOTE — ED PROVIDER NOTES
1. Ureteral stone with hydronephrosis    2. Nephrolithiasis    3. Kidney stones    4. UTI (urinary tract infection)    5. Headache      ED Disposition       ED Disposition   Transfer to Another Facility-In Network    Condition   --    Date/Time   Tue Sep 24, 2024  5:06 PM    Comment   Mookie Muhammad should be transferred out to [facility].               Assessment & Plan       Medical Decision Making  68 yo female presenting for evaluation.  Prior records reviewed.  She has history of recurrent UTIs. She is currently on antibiotic therapy.  Will check labs and obtain CT scan to further evaluate.  Will provide fluids and symptomatic management.  Unclear if symptoms related to antibiotic therapy.  She is afebrile, hemodynamically stable.    Work up obtained as noted above.  No noted leukocytosis, no anemia.  No hypo or hyperglycemia.  Renal function within normal limits. Electrolytes within normal limits.  UA not suggestive of infection at present however UA could be masked due to current antibiotic usage.  CT imaging was obtained which shows an obstructing proximal ureteral stone on the left.  There is a large stone on the right in the renal pelvis which appears chronic.  Urology was consulted in regards to findings and recent UTI.  Recommendation made to transfer for urological consultation.  Pt and family in agreeance with plan of care.  Symptomatically pt felt improved with resolution of her headache.  No significant flank pain.  She does not appear septic.  No noted BURT.  Discussed with SLIM at [facility] who accepted in transfer, med surg level of care.  SLIM and urology consults placed.  NPO at midnight.  Pt remained hemodynamically stable.  She did have some anxiety regarding being admitted to the hospital, provided dose of ativan with improvement.  Pending transport to [facility] this evening.    Please refer to above ER course for further details/discussion.    Problems Addressed:  Headache: acute illness or injury      Details: Resolved; non focal exam.    Kidney stones: chronic illness or injury  Ureteral stone with hydronephrosis: acute illness or injury     Details: Left proximal 3 mm  UTI (urinary tract infection): acute illness or injury     Details: Urine does not appear overtly infected at this time but she is currently on antibiotic therapy.  Recent urine cultures reviewed which showed infection.    Amount and/or Complexity of Data Reviewed  External Data Reviewed: labs, radiology and notes.  Labs: ordered. Decision-making details documented in ED Course.  Radiology: ordered. Decision-making details documented in ED Course.  Discussion of management or test interpretation with external provider(s): Urology, SLIM    Risk  OTC drugs.  Prescription drug management.  Decision regarding hospitalization.                ED Course as of 09/24/24 2128   Tue Sep 24, 2024   1310 Prior records reviewed.  Was seen by PCP on 9/18/24 for urinary symptoms.  Urine culture grew out >100k E.coli, started on cefdinir by PCP on 9/20/24.  Pt also had a urine culture performed on 9/6/24 which grew out >100k E.coli and 50-59k Klebsiella pnemoniae.  Was on ceftin and doxycycline at that time.   1511 WBC: 9.48   1511 Hemoglobin: 12.3   1511 Platelet Count: 301   1511 GLUCOSE: 107   1512 Creatinine: 1.05  Baseline appears around 0.8-0.9   1512 BUN: 24   1512 Sodium: 136   1512 Potassium: 3.7   1512 Chloride: 101   1512 Carbon Dioxide: 26   1512 ANION GAP: 9   1512 Calcium: 9.3   1512 AST: 15   1512 ALT: 10   1512 ALK PHOS: 44   1512 Total Protein: 7.4   1512 Albumin: 4.1   1512 Total Bilirubin: 0.50   1512 GFR, Calculated: 54   1512 LIPASE: 31   1512 LACTIC ACID: 0.9   1512 MAGNESIUM: 1.9   1512 Leukocytes, UA(!): Trace   1512 POCT URINE PROTEIN(!): Trace   1512 Blood, UA(!): Small   1512 RBC Urine: 0-1   1512 WBC, UA: 2-4   1642 CT abdomen pelvis with contrast  IMPRESSION:     1.  3 mm proximal left ureteral calculus eliciting moderate left  hydronephrosis and acute left renal and perirenal inflammation  2.  15 mm calculus at the right renal pelvis with mild hydronephrosis. Mild enhancement of the adjacent urothelium can be indicative of chronic inflammation.   1648 Epic secure chat to on call urology to further discuss CT findings.   1728 Spoke with MANDIIM at [facility]"; accepts in transfer med surg.  Per PACS, unsure if transfer will occur tonight vs tomorrow before scheduled procedure.  Request was made to place internal medicine consult.   1740 Received message from PACS; has bed assigned and will likely transfer tonight.   1923 Awaiting transfer.  Pt having increased anxiety.  Pain, nausea, headache has been better.   1924 Received update from PACS that  scheduled for 2100.   2125 Sign out to Dr. Sequeira pending .  EMTALA was completed and on chart.       Medications   multi-electrolyte (ISOLYTE-S PH 7.4) bolus 1,000 mL (0 mL Intravenous Stopped 9/24/24 1434)   ondansetron (ZOFRAN) injection 4 mg (4 mg Intravenous Given 9/24/24 1334)   ketorolac (TORADOL) injection 15 mg (15 mg Intravenous Given 9/24/24 1334)   acetaminophen (Ofirmev) injection 1,000 mg (0 mg Intravenous Stopped 9/24/24 1349)   iohexol (OMNIPAQUE) 350 MG/ML injection (MULTI-DOSE) 100 mL (100 mL Intravenous Given 9/24/24 1546)   LORazepam (ATIVAN) tablet 0.5 mg (0.5 mg Oral Given 9/24/24 1951)       History of Present Illness       69 year old female with PMH HTN, kidney stones, recurrent UTIs, h/o C.diff colitis presenting ambulatory from home for evaluation of feeling unwell.  Pt reports she has been dealing with a UTI for the past month.  She reports she is currently on her 3rd antibiotic for infection.  She started cefdinir on 9/20/24.  She reports no specific urinary complaints such as dysuria, frequency, urgency.  She reports she usually knows she has one but the smell and color of her urine.  She reports she really has improved symptoms since taking the antibiotic.   Denies fevers but reports chills.  Today she notes she's been having a headache.  She describes this as a pressure across her head.  She complains of associated nausea.  Denies dizziness, lightheadedness, visual disturbance.  Denies chest pain, SOB.  Denies V/D/C.  She reports pain which she describes as pressure across lower abdomen.  She reports some pain around left flank.  Denies numbness, tingling, weakness.  She reports this morning her urine appeared to have a reddish discoloration.  Denies cough, congestion.  Denies recent travel.  Denies sick contacts.  No reported aggravating or alleviating factors.  No specific treatments tried.      History provided by:  Patient and medical records   used: No    Headache  Associated symptoms: abdominal pain, fatigue and nausea    Associated symptoms: no back pain, no congestion, no cough, no diarrhea, no dizziness, no fever, no myalgias, no neck pain, no numbness, no sore throat and no vomiting        Review of Systems   Constitutional:  Positive for chills and fatigue. Negative for fever.   HENT: Negative.  Negative for congestion, rhinorrhea and sore throat.    Eyes: Negative.  Negative for visual disturbance.   Respiratory: Negative.  Negative for cough, shortness of breath and wheezing.    Cardiovascular: Negative.  Negative for chest pain, palpitations and leg swelling.   Gastrointestinal:  Positive for abdominal pain and nausea. Negative for constipation, diarrhea and vomiting.   Genitourinary:  Positive for flank pain. Negative for dysuria, frequency and hematuria.   Musculoskeletal:  Negative for back pain, myalgias and neck pain.   Skin: Negative.  Negative for rash.   Neurological:  Positive for headaches. Negative for dizziness, syncope, light-headedness and numbness.   Psychiatric/Behavioral: Negative.     All other systems reviewed and are negative.          Objective     ED Triage Vitals [09/24/24 1232]   Temperature Pulse Blood Pressure  Respirations SpO2 Patient Position - Orthostatic VS   97.7 °F (36.5 °C) 98 (!) 173/84 18 100 % Sitting      Temp Source Heart Rate Source BP Location FiO2 (%) Pain Score    Temporal Monitor Right arm -- 10 - Worst Possible Pain        Physical Exam  Vitals and nursing note reviewed.   Constitutional:       General: She is awake. She is not in acute distress.     Appearance: She is well-developed. She is not toxic-appearing or diaphoretic.   HENT:      Head: Normocephalic and atraumatic.      Right Ear: Hearing, tympanic membrane, ear canal and external ear normal.      Left Ear: Hearing, tympanic membrane, ear canal and external ear normal.      Nose: Nose normal.      Mouth/Throat:      Mouth: Oropharynx is clear and moist and mucous membranes are normal. Mucous membranes are moist.      Pharynx: Oropharynx is clear. Uvula midline.   Eyes:      General: Lids are normal. No scleral icterus.     Extraocular Movements: EOM normal.      Conjunctiva/sclera: Conjunctivae normal.      Pupils: Pupils are equal, round, and reactive to light.   Neck:      Trachea: Trachea and phonation normal. No tracheal deviation.   Cardiovascular:      Rate and Rhythm: Normal rate and regular rhythm.      Pulses: Normal pulses.           Radial pulses are 2+ on the right side and 2+ on the left side.      Heart sounds: Normal heart sounds, S1 normal and S2 normal. No murmur heard.  Pulmonary:      Effort: Pulmonary effort is normal. No tachypnea or respiratory distress.      Breath sounds: Normal breath sounds. No wheezing, rhonchi or rales.   Abdominal:      General: Bowel sounds are normal. There is no distension.      Palpations: Abdomen is soft.      Tenderness: There is abdominal tenderness in the suprapubic area. There is no CVA tenderness, right CVA tenderness, left CVA tenderness, guarding or rebound.   Musculoskeletal:         General: No edema.      Cervical back: Normal range of motion and neck supple.      Right lower leg: No  edema.      Left lower leg: No edema.   Skin:     General: Skin is warm and dry.      Capillary Refill: Capillary refill takes less than 2 seconds.      Findings: No rash.   Neurological:      General: No focal deficit present.      Mental Status: She is alert and oriented to person, place, and time.      GCS: GCS eye subscore is 4. GCS verbal subscore is 5. GCS motor subscore is 6.      Cranial Nerves: No cranial nerve deficit.      Sensory: No sensory deficit.      Motor: No abnormal muscle tone.      Gait: Gait normal.   Psychiatric:         Mood and Affect: Mood is anxious.         Speech: Speech normal.         Behavior: Behavior normal. Behavior is cooperative.         Labs Reviewed   CBC AND DIFFERENTIAL - Abnormal       Result Value    WBC 9.48      RBC 4.04      Hemoglobin 12.3      Hematocrit 37.1      MCV 92      MCH 30.4      MCHC 33.2      RDW 12.3      MPV 10.1      Platelets 301      nRBC 0      Segmented % 80 (*)     Immature Grans % 0      Lymphocytes % 12 (*)     Monocytes % 8      Eosinophils Relative 0      Basophils Relative 0      Absolute Neutrophils 7.52      Absolute Immature Grans 0.02      Absolute Lymphocytes 1.11      Absolute Monocytes 0.78      Eosinophils Absolute 0.02      Basophils Absolute 0.03     UA W REFLEX TO MICROSCOPIC WITH REFLEX TO CULTURE - Abnormal    Color, UA Yellow      Clarity, UA Clear      Specific Gravity, UA 1.020      pH, UA 6.0      Leukocytes, UA Trace (*)     Nitrite, UA Negative      Protein, UA Trace (*)     Glucose, UA Negative      Ketones, UA Negative      Urobilinogen, UA <2.0      Bilirubin, UA Negative      Occult Blood, UA Small (*)    LACTIC ACID, PLASMA (W/REFLEX IF RESULT > 2.0) - Normal    LACTIC ACID 0.9      Narrative:     Result may be elevated if tourniquet was used during collection.   LIPASE - Normal    Lipase 31     MAGNESIUM - Normal    Magnesium 1.9     URINE MICROSCOPIC - Normal    RBC, UA 0-1      WBC, UA 2-4      Epithelial Cells  Occasional      Bacteria, UA Occasional     COMPREHENSIVE METABOLIC PANEL    Sodium 136      Potassium 3.7      Chloride 101      CO2 26      ANION GAP 9      BUN 24      Creatinine 1.05      Glucose 107      Calcium 9.3      AST 15      ALT 10      Alkaline Phosphatase 44      Total Protein 7.4      Albumin 4.1      Total Bilirubin 0.50      eGFR 54      Narrative:     National Kid[employer] guidelines for Chronic Kidney Disease (CKD):     Stage 1 with normal or high GFR (GFR > 90 mL/min/1.73 square meters)    Stage 2 Mild CKD (GFR = 60-89 mL/min/1.73 square meters)    Stage 3A Moderate CKD (GFR = 45-59 mL/min/1.73 square meters)    Stage 3B Moderate CKD (GFR = 30-44 mL/min/1.73 square meters)    Stage 4 Severe CKD (GFR = 15-29 mL/min/1.73 square meters)    Stage 5 End Stage CKD (GFR <15 mL/min/1.73 square meters)  Note: GFR calculation is accurate only with a steady state creatinine     CT abdomen pelvis with contrast   Final Interpretation by Jose Guadalupe Traylor MD (09/24 9241)      1.  3 mm proximal left ureteral calculus eliciting moderate left hydronephrosis and acute left renal and perirenal inflammation   2.  15 mm calculus at the right renal pelvis with mild hydronephrosis. Mild enhancement of the adjacent urothelium can be indicative of chronic inflammation.         Workstation performed: [id number]             Procedures    ED Medication and Procedure Management   Prior to Admission Medications   Prescriptions Last Dose Informant Patient Reported? Taking?   Ascorbic Acid (VITAMIN C PO)  Self Yes No   Sig: Take by mouth in the morning   Bacillus Coagulans-Inulin (Probiotic) 1-250 BILLION-MG CAPS  Self Yes No   Sig: Take 1 capsule by mouth in the morning   VITAMIN D PO  Self Yes No   Sig: Take by mouth in the morning   Patient not taking: Reported on 3/1/2024   cefdinir (OMNICEF) 300 mg capsule   No No   Sig: Take 1 capsule (300 mg total) by mouth 2 (two) times a day for 7 days   valsartan (DIOVAN)  80 mg tablet  Self No No   Sig: Take 1 tablet (80 mg total) by mouth daily      Facility-Administered Medications: None     Patient's Medications   Discharge Prescriptions    No medications on file     No discharge procedures on file.     Ching Alvarado PA-C  09/24/24 0245

## 2024-09-24 NOTE — QUICK NOTE
Patient with UTI and imaging findings of bilateral ureteral stones with hydronephrosis. No BURT, afebrile, VSS . Discussed case with attending.    Plan:  - Transfer to [facility] for medical management  - Antibiotics and medical optimization per primary team  - Fluids, Flomax, strain urine  - NPO  - Case requested for bilateral stent placement for tomorrow 9/25/24  - Formal urology consult to follow on arrival to [facility]    Margie Nelson PA-C

## 2024-09-25 ENCOUNTER — PREP FOR PROCEDURE (OUTPATIENT)
Dept: UROLOGY | Facility: CLINIC | Age: 69
End: 2024-09-25

## 2024-09-25 ENCOUNTER — TELEPHONE (OUTPATIENT)
Dept: UROLOGY | Facility: CLINIC | Age: 69
End: 2024-09-25

## 2024-09-25 ENCOUNTER — ANESTHESIA EVENT (INPATIENT)
Dept: PERIOP | Facility: HOSPITAL | Age: 69
End: 2024-09-25
Payer: COMMERCIAL

## 2024-09-25 ENCOUNTER — APPOINTMENT (INPATIENT)
Dept: RADIOLOGY | Facility: HOSPITAL | Age: 69
End: 2024-09-25
Payer: COMMERCIAL

## 2024-09-25 ENCOUNTER — ANESTHESIA (INPATIENT)
Dept: PERIOP | Facility: HOSPITAL | Age: 69
End: 2024-09-25
Payer: COMMERCIAL

## 2024-09-25 DIAGNOSIS — N20.1 LEFT URETERAL STONE: Primary | ICD-10-CM

## 2024-09-25 DIAGNOSIS — N20.0 RIGHT RENAL STONE: ICD-10-CM

## 2024-09-25 PROBLEM — R79.89 ELEVATED SERUM CREATININE: Status: ACTIVE | Noted: 2024-09-25

## 2024-09-25 PROBLEM — IMO0001 SMOKING: Status: ACTIVE | Noted: 2024-09-25

## 2024-09-25 PROBLEM — F17.200 SMOKING: Status: ACTIVE | Noted: 2024-09-25

## 2024-09-25 LAB
ANION GAP SERPL CALCULATED.3IONS-SCNC: 7 MMOL/L (ref 4–13)
BUN SERPL-MCNC: 20 MG/DL (ref 5–25)
CALCIUM SERPL-MCNC: 8.4 MG/DL (ref 8.4–10.2)
CHLORIDE SERPL-SCNC: 104 MMOL/L (ref 96–108)
CO2 SERPL-SCNC: 26 MMOL/L (ref 21–32)
CREAT SERPL-MCNC: 1.18 MG/DL (ref 0.6–1.3)
ERYTHROCYTE [DISTWIDTH] IN BLOOD BY AUTOMATED COUNT: 12.6 % (ref 11.6–15.1)
GFR SERPL CREATININE-BSD FRML MDRD: 47 ML/MIN/1.73SQ M
GLUCOSE SERPL-MCNC: 98 MG/DL (ref 65–140)
HCT VFR BLD AUTO: 31.1 % (ref 34.8–46.1)
HGB BLD-MCNC: 10.4 G/DL (ref 11.5–15.4)
MCH RBC QN AUTO: 30 PG (ref 26.8–34.3)
MCHC RBC AUTO-ENTMCNC: 33.4 G/DL (ref 31.4–37.4)
MCV RBC AUTO: 90 FL (ref 82–98)
PLATELET # BLD AUTO: 269 THOUSANDS/UL (ref 149–390)
PMV BLD AUTO: 9.2 FL (ref 8.9–12.7)
POTASSIUM SERPL-SCNC: 3.8 MMOL/L (ref 3.5–5.3)
RBC # BLD AUTO: 3.47 MILLION/UL (ref 3.81–5.12)
SODIUM SERPL-SCNC: 137 MMOL/L (ref 135–147)
WBC # BLD AUTO: 9.91 THOUSAND/UL (ref 4.31–10.16)

## 2024-09-25 PROCEDURE — C1769 GUIDE WIRE: HCPCS | Performed by: UROLOGY

## 2024-09-25 PROCEDURE — 99223 1ST HOSP IP/OBS HIGH 75: CPT | Performed by: UROLOGY

## 2024-09-25 PROCEDURE — 99232 SBSQ HOSP IP/OBS MODERATE 35: CPT | Performed by: PHYSICIAN ASSISTANT

## 2024-09-25 PROCEDURE — 85027 COMPLETE CBC AUTOMATED: CPT | Performed by: INTERNAL MEDICINE

## 2024-09-25 PROCEDURE — 52332 CYSTOSCOPY AND TREATMENT: CPT | Performed by: UROLOGY

## 2024-09-25 PROCEDURE — C1758 CATHETER, URETERAL: HCPCS | Performed by: UROLOGY

## 2024-09-25 PROCEDURE — C2625 STENT, NON-COR, TEM W/DEL SY: HCPCS | Performed by: UROLOGY

## 2024-09-25 PROCEDURE — 80048 BASIC METABOLIC PNL TOTAL CA: CPT | Performed by: INTERNAL MEDICINE

## 2024-09-25 PROCEDURE — 99223 1ST HOSP IP/OBS HIGH 75: CPT | Performed by: INTERNAL MEDICINE

## 2024-09-25 PROCEDURE — BT141ZZ FLUOROSCOPY OF KIDNEYS, URETERS AND BLADDER USING LOW OSMOLAR CONTRAST: ICD-10-PCS | Performed by: INTERNAL MEDICINE

## 2024-09-25 PROCEDURE — 0T788DZ DILATION OF BILATERAL URETERS WITH INTRALUMINAL DEVICE, VIA NATURAL OR ARTIFICIAL OPENING ENDOSCOPIC: ICD-10-PCS | Performed by: INTERNAL MEDICINE

## 2024-09-25 PROCEDURE — 74420 UROGRAPHY RTRGR +-KUB: CPT

## 2024-09-25 DEVICE — INLAY OPTIMA URETERAL STENT W/O GUIDEWIRE
Type: IMPLANTABLE DEVICE | Site: URETER | Status: FUNCTIONAL
Brand: BARD® INLAY OPTIMA® URETERAL STENT

## 2024-09-25 RX ORDER — ONDANSETRON 2 MG/ML
INJECTION INTRAMUSCULAR; INTRAVENOUS AS NEEDED
Status: DISCONTINUED | OUTPATIENT
Start: 2024-09-25 | End: 2024-09-25

## 2024-09-25 RX ORDER — LIDOCAINE HYDROCHLORIDE 10 MG/ML
INJECTION, SOLUTION EPIDURAL; INFILTRATION; INTRACAUDAL; PERINEURAL AS NEEDED
Status: DISCONTINUED | OUTPATIENT
Start: 2024-09-25 | End: 2024-09-25

## 2024-09-25 RX ORDER — ONDANSETRON 2 MG/ML
4 INJECTION INTRAMUSCULAR; INTRAVENOUS ONCE AS NEEDED
Status: DISCONTINUED | OUTPATIENT
Start: 2024-09-25 | End: 2024-09-25 | Stop reason: HOSPADM

## 2024-09-25 RX ORDER — FENTANYL CITRATE/PF 50 MCG/ML
25 SYRINGE (ML) INJECTION
Status: DISCONTINUED | OUTPATIENT
Start: 2024-09-25 | End: 2024-09-25 | Stop reason: HOSPADM

## 2024-09-25 RX ORDER — ALBUTEROL SULFATE 0.83 MG/ML
2.5 SOLUTION RESPIRATORY (INHALATION) ONCE AS NEEDED
Status: DISCONTINUED | OUTPATIENT
Start: 2024-09-25 | End: 2024-09-25 | Stop reason: HOSPADM

## 2024-09-25 RX ORDER — CEFTRIAXONE 1 G/1
1000 INJECTION, POWDER, FOR SOLUTION INTRAMUSCULAR; INTRAVENOUS ONCE
OUTPATIENT
Start: 2024-09-25 | End: 2024-09-25

## 2024-09-25 RX ORDER — METOCLOPRAMIDE HYDROCHLORIDE 5 MG/ML
10 INJECTION INTRAMUSCULAR; INTRAVENOUS ONCE AS NEEDED
Status: DISCONTINUED | OUTPATIENT
Start: 2024-09-25 | End: 2024-09-25 | Stop reason: HOSPADM

## 2024-09-25 RX ORDER — SODIUM CHLORIDE, SODIUM LACTATE, POTASSIUM CHLORIDE, CALCIUM CHLORIDE 600; 310; 30; 20 MG/100ML; MG/100ML; MG/100ML; MG/100ML
INJECTION, SOLUTION INTRAVENOUS CONTINUOUS PRN
Status: DISCONTINUED | OUTPATIENT
Start: 2024-09-25 | End: 2024-09-25

## 2024-09-25 RX ORDER — PROPOFOL 10 MG/ML
INJECTION, EMULSION INTRAVENOUS AS NEEDED
Status: DISCONTINUED | OUTPATIENT
Start: 2024-09-25 | End: 2024-09-25

## 2024-09-25 RX ORDER — HYDROMORPHONE HCL/PF 1 MG/ML
0.5 SYRINGE (ML) INJECTION
Status: DISCONTINUED | OUTPATIENT
Start: 2024-09-25 | End: 2024-09-25 | Stop reason: HOSPADM

## 2024-09-25 RX ORDER — LORAZEPAM 2 MG/ML
0.5 INJECTION INTRAMUSCULAR ONCE
Status: COMPLETED | OUTPATIENT
Start: 2024-09-25 | End: 2024-09-25

## 2024-09-25 RX ORDER — FENTANYL CITRATE 50 UG/ML
INJECTION, SOLUTION INTRAMUSCULAR; INTRAVENOUS AS NEEDED
Status: DISCONTINUED | OUTPATIENT
Start: 2024-09-25 | End: 2024-09-25

## 2024-09-25 RX ORDER — DEXAMETHASONE SODIUM PHOSPHATE 10 MG/ML
INJECTION, SOLUTION INTRAMUSCULAR; INTRAVENOUS AS NEEDED
Status: DISCONTINUED | OUTPATIENT
Start: 2024-09-25 | End: 2024-09-25

## 2024-09-25 RX ADMIN — LIDOCAINE HYDROCHLORIDE 50 MG: 10 INJECTION, SOLUTION EPIDURAL; INFILTRATION; INTRACAUDAL; PERINEURAL at 15:16

## 2024-09-25 RX ADMIN — FENTANYL CITRATE 25 MCG: 50 INJECTION INTRAMUSCULAR; INTRAVENOUS at 15:15

## 2024-09-25 RX ADMIN — FENTANYL CITRATE 50 MCG: 50 INJECTION INTRAMUSCULAR; INTRAVENOUS at 15:39

## 2024-09-25 RX ADMIN — ONDANSETRON 4 MG: 2 INJECTION INTRAMUSCULAR; INTRAVENOUS at 15:17

## 2024-09-25 RX ADMIN — PROPOFOL 130 MG: 10 INJECTION, EMULSION INTRAVENOUS at 15:16

## 2024-09-25 RX ADMIN — FENTANYL CITRATE 25 MCG: 50 INJECTION INTRAMUSCULAR; INTRAVENOUS at 15:22

## 2024-09-25 RX ADMIN — DEXAMETHASONE SODIUM PHOSPHATE 10 MG: 10 INJECTION, SOLUTION INTRAMUSCULAR; INTRAVENOUS at 15:17

## 2024-09-25 RX ADMIN — SODIUM CHLORIDE, SODIUM LACTATE, POTASSIUM CHLORIDE, AND CALCIUM CHLORIDE: .6; .31; .03; .02 INJECTION, SOLUTION INTRAVENOUS at 15:08

## 2024-09-25 RX ADMIN — ONDANSETRON 4 MG: 2 INJECTION INTRAMUSCULAR; INTRAVENOUS at 06:03

## 2024-09-25 RX ADMIN — ACETAMINOPHEN 650 MG: 325 TABLET ORAL at 09:09

## 2024-09-25 RX ADMIN — LORAZEPAM 0.5 MG: 2 INJECTION INTRAMUSCULAR; INTRAVENOUS at 14:29

## 2024-09-25 RX ADMIN — SODIUM CHLORIDE, SODIUM GLUCONATE, SODIUM ACETATE, POTASSIUM CHLORIDE, MAGNESIUM CHLORIDE, SODIUM PHOSPHATE, DIBASIC, AND POTASSIUM PHOSPHATE 125 ML/HR: .53; .5; .37; .037; .03; .012; .00082 INJECTION, SOLUTION INTRAVENOUS at 00:02

## 2024-09-25 RX ADMIN — CEFTRIAXONE SODIUM 2000 MG: 10 INJECTION, POWDER, FOR SOLUTION INTRAVENOUS at 15:55

## 2024-09-25 NOTE — ASSESSMENT & PLAN NOTE
Patient with prior history of renal stones and recurrent UTIs presenting initially to the [facility] ED with ongoing malaise despite multiple rounds of antibiotics as outpatient for apparent acute cystitis  CT abdomen/pelvis at the outside campus concerning for a 3 mm proximal left ureteral calculus with moderate left hydronephrosis and acute left renal/perirenal inflammation additionally pulmonary calculus at the right renal pelvis with mild hydronephrosis with mild enhancement of the adjacent urothelium suspected chronic inflammation.  Labs with creatinine somewhat elevated from recent values, UA with occasional bacteria but 2-4 WBCs  EM physician at the outside campus discussed with urology on-call, transferred to his hospital for urology evaluation/intervention advised.  Keep n.p.o. after midnight and pending formal urology consultation  Continue IV fluids  Continue as needed analgesia/antiemetic regimen  Strain all urine

## 2024-09-25 NOTE — ASSESSMENT & PLAN NOTE
Continue home antihypertensive (losartan substituted for valsartan while admitted due to formulary limitation) while admitted with strict hold parameters and monitor blood pressure per protocol

## 2024-09-25 NOTE — TELEPHONE ENCOUNTER
Patient had bilateral ureteral stents placed today for a 3 mm left proximal ureteral stone and a 15 mm right renal stone with mild hydronephrosis with concern for urinary tract infection based on history of recurrent UTIs.  She was offered ureteroscopy as she had been on antibiotics but elected for stent placement with second stage stone surgery later date.    Stents were placed without difficulty.  Urine did not appear overtly infected.  Will plan for second stage stone surgery in the near future.

## 2024-09-25 NOTE — H&P
H&P - Hospitalist   Name: Mookie Muhammad 69 y.o. female I MRN: [id number]  Unit/Bed#: -01 I Date of Admission: 9/24/2024   Date of Service: 9/25/2024 I Hospital Day: 1     Assessment & Plan  Hydronephrosis with urinary obstruction due to ureteral calculus  Patient with prior history of renal stones and recurrent UTIs presenting initially to the [facility] ED with ongoing malaise despite multiple rounds of antibiotics as outpatient for apparent acute cystitis  CT abdomen/pelvis at the outside campus concerning for a 3 mm proximal left ureteral calculus with moderate left hydronephrosis and acute left renal/perirenal inflammation additionally pulmonary calculus at the right renal pelvis with mild hydronephrosis with mild enhancement of the adjacent urothelium suspected chronic inflammation.  Labs with creatinine somewhat elevated from recent values, UA with occasional bacteria but 2-4 WBCs  EM physician at the outside campus discussed with urology on-call, transferred to his hospital for urology evaluation/intervention advised.  Keep n.p.o. after midnight and pending formal urology consultation  Continue IV fluids  Continue as needed analgesia/antiemetic regimen  Strain all urine  Essential hypertension, benign  Continue home antihypertensive (losartan substituted for valsartan while admitted due to formulary limitation) while admitted with strict hold parameters and monitor blood pressure per protocol  Elevated serum creatinine  Baseline creatinine appearing 0.8-0.9, creatinine on admission slightly higher than baseline  Suspect in setting of obstructing stone, continue IV fluids and plan for urology consultation as above.  Repeat BMP in a.m.  Avoid/limit nephrotoxins and hypotension      VTE Prophylaxis: Pharmacologic VTE Prophylaxis contraindicated due to preprocedure   / sequential compression device   Code Status: Level 1 - Full Code   POLST: POLST form is not discussed and not completed at this  time.  Discussion with family: Patient declines at this time    Anticipated Length of Stay:  Patient will be admitted on an Inpatient basis with an anticipated length of stay of  greater than 2 midnights.   Justification for Hospital Stay: Please see detailed plans noted above.    Chief Complaint:     Malaise, nausea, frequent UTIs  History of Present Illness:  Mookie Muhammad is a 69 y.o. female who has a past medical history significant for recurrent renal stones requiring multiple interventions, reported recurrent UTIs, hypertension presenting initially to the [facility] ED 9/24/2024 with complaints of generalized malaise, nausea without vomiting, and transient left-sided flank pain.  Patient stating she has had multiple UTIs over the past several months requiring multiple antibiotic courses, with course in June 2024 complicated by development of apparent C. difficile infection.  She was seen by her PCP twice in the past month for similar and empirically prescribed doxycycline for infection unfortunately with persistent symptomatology.  On the day of presentation she noted the left side flank pain as well as ongoing malaise which prompted to presentation.  She denied any fever/chills, vomiting, althea dysuria/hematuria, or other systemic symptoms.  During ED workup at the [facility] ED her labs revealed an elevated creatinine compared to baseline and CT abdomen/pelvis concerning for hydronephrosis of the left kidney secondary to obstructing 3 mm proximal ureteral stone additionally with 15 mm right renal pelvis stone with mild hydronephrosis.  EM provider at the outside campus discussed with urology on-call who advised transfer to this campus for urology consultation and likely intervention with bilateral stent placement 9/25/2024.    Currently, patient is lying in bed in no acute distress and comfortable appearing, stating her symptoms are control at this time although admits ongoing frustration with her  recurrent symptoms.    Review of Systems:    Constitutional:  Denies fever or chills   Eyes:  Denies change in visual acuity   HENT:  Denies nasal congestion or sore throat   Respiratory:  Denies cough or shortness of breath   Cardiovascular:  Denies chest pain or edema   GI:  Denies abdominal pain, vomiting, bloody stools or diarrhea but reported nausea   :  Denies dysuria   Musculoskeletal:  Denies back pain or joint pain   Integument:  Denies rash   Neurologic:  Denies headache, focal weakness or sensory changes   Endocrine:  Denies polyuria or polydipsia   Lymphatic:  Denies swollen glands   Psychiatric:  Denies depression or anxiety     Past Medical and Surgical History:   Past Medical History:   Diagnosis Date    Allergic     seasonal    Hypertension     Kidney stone     Ovarian mass     Pelvic pain      Past Surgical History:   Procedure Laterality Date    COLONOSCOPY      EXTRACORPOREAL SHOCK WAVE LITHOTRIPSY      WY LAPS TOTAL HYSTERECT 250 GM/< W/RMVL TUBE/OVARY N/A 11/3/2023    Procedure: (LT) W/ ROBOT, BILATERAL SALPINGO-OOPHORECTOMY, LYSIS OF ADHESIONS;  Surgeon: Pineda Montemayor MD;  Location: [facility] OR;  Service: Gynecology Oncology       Meds/Allergies:    Medications Prior to Admission:     Ascorbic Acid (VITAMIN C PO)    Bacillus Coagulans-Inulin (Probiotic) 1-250 BILLION-MG CAPS    valsartan (DIOVAN) 80 mg tablet    cefdinir (OMNICEF) 300 mg capsule    VITAMIN D PO    Allergies:   Allergies   Allergen Reactions    Ciprofloxacin Itching     Itching and shaking, no breathing trouble    Sulfa Antibiotics Hives and Palpitations    Macrobid [Nitrofurantoin] Hives     History:  Marital Status: /Civil Union     Substance Use History:   Social History     Substance and Sexual Activity   Alcohol Use Yes    Comment: rare     Social History     Tobacco Use   Smoking Status Every Day    Current packs/day: 0.25    Average packs/day: 0.3 packs/day for 52.7 years (13.2 ttl pk-yrs)    Types:  Cigarettes    Start date: 1972    Passive exposure: Current   Smokeless Tobacco Never     Social History     Substance and Sexual Activity   Drug Use Never       Family History:  Family History   Problem Relation Age of Onset    No Known Problems Mother     Hypertension Father        Physical Exam:     Vitals:   Blood Pressure: 142/89 (09/24/24 2311)  Pulse: 87 (09/24/24 2311)  Temperature: 98.4 °F (36.9 °C) (09/24/24 2311)  SpO2: 97 % (09/24/24 2311)    Constitutional:  Well developed, well nourished, no acute distress, non-toxic appearance   Eyes:  PERRL, conjunctiva normal   HENT:  Atraumatic, external ears normal, nose normal, oropharynx moist, no pharyngeal exudates. Neck- normal range of motion, no tenderness, supple   Respiratory:  No respiratory distress, normal breath sounds, no rales, no wheezing   Cardiovascular:  Normal rate, normal rhythm, no murmurs, no gallops, no rubs   GI:  Soft, nondistended, normal bowel sounds, nontender, no organomegaly, no mass, no rebound, no guarding   :  No costovertebral angle tenderness   Musculoskeletal:  No edema, no tenderness, no deformities. Back- no tenderness  Integument:  Well hydrated, no rash   Lymphatic:  No lymphadenopathy noted   Neurologic:  Alert &awake, communicative, CN 2-12 normal, normal motor function, normal sensory function, no focal deficits noted   Psychiatric:  Speech and behavior appropriate       Lab Results: Lab Results: I have reviewed labs from this admission including CMP, CBC, lactic acid, urinalysis  Results from last 7 days   Lab Units 09/24/24  1329   WBC Thousand/uL 9.48   HEMOGLOBIN g/dL 12.3   HEMATOCRIT % 37.1   PLATELETS Thousands/uL 301   SEGS PCT % 80*   LYMPHO PCT % 12*   MONO PCT % 8   EOS PCT % 0     Results from last 7 days   Lab Units 09/24/24  1329   SODIUM mmol/L 136   POTASSIUM mmol/L 3.7   CHLORIDE mmol/L 101   CO2 mmol/L 26   BUN mg/dL 24   CREATININE mg/dL 1.05   ANION GAP mmol/L 9   CALCIUM mg/dL 9.3   ALBUMIN g/dL  4.1   TOTAL BILIRUBIN mg/dL 0.50   ALK PHOS U/L 44   ALT U/L 10   AST U/L 15   GLUCOSE RANDOM mg/dL 107                 Results from last 7 days   Lab Units 09/24/24  1329   LACTIC ACID mmol/L 0.9       Imaging: Reviewed radiology reports from this admission including: CT abdomen/pelvis.    CT abdomen pelvis with contrast    Result Date: 9/24/2024  Narrative: CT ABDOMEN AND PELVIS WITH IV CONTRAST INDICATION: abd pain. . COMPARISON: CT abdomen pelvis March 9, 2023 TECHNIQUE: CT examination of the abdomen and pelvis was performed. Multiplanar 2D reformatted images were created from the source data. This examination, like all CT scans performed in the [facility], was performed utilizing techniques to minimize radiation dose exposure, including the use of iterative reconstruction and automated exposure control. Radiation dose length product (DLP) for this visit: 609 mGy-cm IV Contrast: 100 mL of iohexol (OMNIPAQUE) Enteric Contrast: Not administered. FINDINGS: ABDOMEN LOWER CHEST: No clinically significant abnormality in the visualized lower chest. LIVER/BILIARY TREE: No surface nodularity. Unchanged 1.6 cm hepatic segment 7 hemangioma. Unchanged 1.9 cm hepatic segment 5 hemangioma. Patent hepatic and portal veins. No biliary ductal dilation. GALLBLADDER: No calcified gallstones. No pericholecystic inflammatory change. SPLEEN: Few subcentimeter hypoattenuating lesions may be hemangiomas. PANCREAS: Unremarkable. ADRENAL GLANDS: Unremarkable. KIDNEYS/URETERS: Delayed enhancement of the left kidney. Left perinephric fat stranding. 3 mm proximal left ureteral calculus. Moderate left hydronephrosis. 15 mm calculus in the right renal pelvis; enhancement of the adjacent urothelium persist though has decreased compared to prior study. Mild right hydronephrosis. Few calculi in the lower pole of right kidney measuring up to 7 mm. Bilateral renal cysts. STOMACH AND BOWEL: Small hiatal hernia. No bowel  obstruction. APPENDIX: No findings to suggest appendicitis. ABDOMINOPELVIC CAVITY: No ascites. No pneumoperitoneum. No lymphadenopathy. VESSELS: Atherosclerosis without aortic aneurysm. 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 PELVIS REPRODUCTIVE ORGANS: Post hysterectomy and bilateral salpingo-oophorectomy. URINARY BLADDER: Punctate focus of air in the urinary bladder. No calculi. ABDOMINAL WALL/INGUINAL REGIONS: Unremarkable. BONES: No acute fracture or suspicious osseous lesion. Spinal degenerative changes.     Impression: 1.  3 mm proximal left ureteral calculus eliciting moderate left hydronephrosis and acute left renal and perirenal inflammation 2.  15 mm calculus at the right renal pelvis with mild hydronephrosis. Mild enhancement of the adjacent urothelium can be indicative of chronic inflammation. Workstation performed: [id number]         ** Please Note: Dragon 360 Dictation voice to text software was used in the creation of this document. **      ** Please Note: This note has been constructed using a voice recognition system. **

## 2024-09-25 NOTE — ANESTHESIA POSTPROCEDURE EVALUATION
Post-Op Assessment Note    CV Status:  Stable  Pain Score: 0    Pain management: adequate       Mental Status:  Awake and alert   Hydration Status:  Stable   PONV Controlled:  None   Airway Patency:  Patent     Post Op Vitals Reviewed: Yes    No anethesia notable event occurred.    Staff: CRNA               /83 (09/25/24 1553)    Temp 99 °F (37.2 °C) (09/25/24 1553)    Pulse 88 (09/25/24 1553)   Resp 20 (09/25/24 1553)    SpO2   99%

## 2024-09-25 NOTE — OP NOTE
OPERATIVE REPORT  PATIENT NAME: Mookie Muhammad    :  1955  MRN: [id number]  Pt Location:  CYSTO ROOM 01    SURGERY DATE: 2024    Surgeons and Role:     * Jim Bautista MD - Primary    Preop Diagnosis:  Left ureteral stone  Right renal pelvis stone with mild hydronephrosis  Recurrent urine tract infection    Post-Op Diagnosis Codes:     * Nephrolithiasis [N20.0]  Left ureteral stone  Right renal pelvis stone with mild hydronephrosis    Procedure(s):  Bilateral - CYSTOSCOPY RETROGRADE PYELOGRAM WITH INSERTION STENT URETERAL    Specimen(s):  * No specimens in log *    Estimated Blood Loss:   Minimal    Drains:  Ureteral Internal Stent Left ureter 6 Fr. (Active)   Number of days: 0       Ureteral Internal Stent Right ureter 6 Fr. (Active)   Number of days: 0       Anesthesia Type:   General    Operative Indications:  Patient recurrent neurocheck infections present to hospital with malaise and intermittent right and left-sided flank pain with CT scan showing a left 3 mm proximal ureteral stone and a 15 mm right renal pelvis stone associated with mild hydronephrosis.  Options were discussed including the role for uteroscopy since it is possible she no longer has an infection since he was taking antibiotics at home and urinalysis did not appear overtly infected but patient wanted the safest option and therefore we discussed and agreed upon bilateral stent placement with second stage stone surgery later date.    Operative Findings:  Urine did not appear infected   Retrograde pyelograms did not show significant hydronephrosis on either side  Bilateral stents placed without difficulty    Complications:   None    Procedure and Technique:  The patient was brought to the operating room.  Anesthesia was induced.  They were moved to dorsal lithotomy position.  Rocephin was ordered based on past culture data and allergies.  A time-out was performed identifying the correct patient, site, and procedure.     A rigid  22 Czech cystoscope was introduced into the bladder.  The urethra was unremarkable.  The bladder was quickly surveyed and without any significant abnormalities.  The urine did not appear infected A  film was taken which did not show evidence of stone on either side.    Attention was turned to the left ureteral orifice.  A 5 Czech open-end catheter was used to intubate the ureteral orifice.  A gentle retrograde pyeloureterogram was performed which showed a minimal hydroureteronephrosis without an obvious filling defect.  A Solo wire was passed through the open-ended catheter up the ureter into the left renal pelvis under fluoroscopic guidance.  The 5 Czech open-ended catheter was removed.  A 6x 24 stent was placed over the wire under visual guidance in the bladder and fluoroscopic guidance proximally.  Once seen to be in appropriate position the wire was removed and a good coil was seen in the collecting system on fluoroscopy and visually in the bladder.  The stent string was not left on.    Attention was now turned to the right side.  A 5 Czech open-end catheter was used to intubate the ureteral orifice.  A gentle retrograde pyeloureterogram was performed which showed a no hydroureteronephrosis and what appeared to be a stone in the renal pelvis based on filling defect.  A Solo wire was passed through the open-ended catheter up the ureter into the left renal pelvis under fluoroscopic guidance.  The 5 Czech open-ended catheter was removed.  A 6x 24 stent was placed over the wire under visual guidance in the bladder and fluoroscopic guidance proximally.  Once seen to be in appropriate position the wire was removed and a good coil was seen in the collecting system on fluoroscopy and visually in the bladder.  The stent string was not left on.    The bladder was drained. This completed the case.  The patient was woken from anesthesia and transferred to PACU in good condition.    PLAN:  Patient will be arranged  for outpatient Urology follow-up to set up definitive stone surgery once potential infection has cleared.    Patient Disposition:  PACU              SIGNATURE: Jim Bautista MD  DATE: September 25, 2024  TIME: 3:48 PM

## 2024-09-25 NOTE — PLAN OF CARE
Problem: PAIN - ADULT  Goal: Verbalizes/displays adequate comfort level or baseline comfort level  Description: Interventions:  - Encourage patient to monitor pain and request assistance  - Assess pain using appropriate pain scale  - Administer analgesics based on type and severity of pain and evaluate response  - Implement non-pharmacological measures as appropriate and evaluate response  - Consider cultural and social influences on pain and pain management  - Notify physician/advanced practitioner if interventions unsuccessful or patient reports new pain  Outcome: Progressing     Problem: INFECTION - ADULT  Goal: Absence or prevention of progression during hospitalization  Description: INTERVENTIONS:  - Assess and monitor for signs and symptoms of infection  - Monitor lab/diagnostic results  - Monitor all insertion sites, i.e. indwelling lines, tubes, and drains  - Monitor endotracheal if appropriate and nasal secretions for changes in amount and color  - [place] appropriate cooling/warming therapies per order  - Administer medications as ordered  - Instruct and encourage patient and family to use good hand hygiene technique  - Identify and instruct in appropriate isolation precautions for identified infection/condition  Outcome: Progressing     Problem: GENITOURINARY - ADULT  Goal: Maintains or returns to baseline urinary function  Description: INTERVENTIONS:  - Assess urinary function  - Encourage oral fluids to ensure adequate hydration if ordered  - Administer IV fluids as ordered to ensure adequate hydration  - Administer ordered medications as needed  - Offer frequent toileting  - Follow urinary retention protocol if ordered  Outcome: Progressing  Goal: Absence of urinary retention  Description: INTERVENTIONS:  - Assess patient’s ability to void and empty bladder  - Monitor I/O  - Bladder scan as needed  - Discuss with physician/AP medications to alleviate retention as needed  - Discuss catheterization  for long term situations as appropriate  Outcome: Progressing  Goal: Urinary catheter remains patent  Description: INTERVENTIONS:  - Assess patency of urinary catheter  - If patient has a chronic foy, consider changing catheter if non-functioning  - Follow guidelines for intermittent irrigation of non-functioning urinary catheter  Outcome: Progressing

## 2024-09-25 NOTE — ASSESSMENT & PLAN NOTE
Patient with prior history of renal stones and recurrent UTIs presenting initially to the [facility] ED with ongoing malaise despite multiple rounds of antibiotics as outpatient for apparent acute cystitis  CT abdomen/pelvis at the outside campus concerning for a 3 mm proximal left ureteral calculus with moderate left hydronephrosis and acute left renal/perirenal inflammation additionally pulmonary calculus at the right renal pelvis with mild hydronephrosis with mild enhancement of the adjacent urothelium suspected chronic inflammation.  Labs with creatinine somewhat elevated from recent values, UA with occasional bacteria but 2-4 WBCs  Monitor off any further abx at this time   EM physician at the outside campus discussed with urology on-call, transferred to his hospital for urology evaluation/intervention advised.   Urology consulted and plan for OR 9/25 for b/l ureteral stents  Continue IV fluids  Continue as needed analgesia/antiemetic regimen  Strain all urine

## 2024-09-25 NOTE — ANESTHESIA PREPROCEDURE EVALUATION
Procedure:  CYSTOSCOPY RETROGRADE PYELOGRAM WITH INSERTION STENT URETERAL (Bilateral: Bladder)    Relevant Problems   CARDIO   (+) Essential hypertension, benign   (+) Liver hemangioma   (+) Pure hypercholesterolemia      GI/HEPATIC   (+) Liver hemangioma      /RENAL   (+) Hydronephrosis with urinary obstruction due to ureteral calculus      MUSCULOSKELETAL   (+) Primary osteoarthritis of right hip      PULMONARY   (+) Smoking      ECG 10/2023  NSR  Nonspec T-wave abnormality    Physical Exam    Airway    Mallampati score: II  TM Distance: >3 FB  Neck ROM: full     Dental   Comment: Poor dentition, nothing loose     Cardiovascular      Pulmonary      Other Findings  post-pubertal.      Anesthesia Plan  ASA Score- 2     Anesthesia Type- general with ASA Monitors.         Additional Monitors:     Airway Plan: LMA.           Plan Factors-Exercise tolerance (METS): >4 METS.    Chart reviewed. EKG reviewed.  Existing labs reviewed. Patient summary reviewed.    Patient is a current smoker. Patient not instructed to abstain from smoking on day of procedure. Patient did not smoke on day of surgery.            Induction- intravenous.    Postoperative Plan-     Perioperative Resuscitation Plan - Level 1 - Full Code.       Informed Consent- Anesthetic plan and risks discussed with patient.  I personally reviewed this patient with the CRNA. Discussed and agreed on the Anesthesia Plan with the CRNA..

## 2024-09-25 NOTE — ASSESSMENT & PLAN NOTE
Baseline creatinine appearing 0.8-0.9, creatinine on admission slightly higher than baseline  Suspect in setting of obstructing stone, continue IV fluids and plan for urology consultation as above.  Repeat BMP in a.m.  Avoid/limit nephrotoxins and hypotension

## 2024-09-25 NOTE — PROGRESS NOTES
Progress Note - Hospitalist   Name: Mookie Muhammad 69 y.o. female I MRN: [id number]  Unit/Bed#: -01 I Date of Admission: 9/24/2024   Date of Service: 9/25/2024 I Hospital Day: 1    Assessment & Plan  Hydronephrosis with urinary obstruction due to ureteral calculus  Patient with prior history of renal stones and recurrent UTIs presenting initially to the [facility] ED with ongoing malaise despite multiple rounds of antibiotics as outpatient for apparent acute cystitis  CT abdomen/pelvis at the outside campus concerning for a 3 mm proximal left ureteral calculus with moderate left hydronephrosis and acute left renal/perirenal inflammation additionally pulmonary calculus at the right renal pelvis with mild hydronephrosis with mild enhancement of the adjacent urothelium suspected chronic inflammation.  Labs with creatinine somewhat elevated from recent values, UA with occasional bacteria but 2-4 WBCs  Monitor off any further abx at this time   EM physician at the outside campus discussed with urology on-call, transferred to his hospital for urology evaluation/intervention advised.   Urology consulted and plan for OR 9/25 for b/l ureteral stents  Continue IV fluids  Continue as needed analgesia/antiemetic regimen  Strain all urine  Essential hypertension, benign  Hold ARB with elevated Cr  Elevated serum creatinine  Baseline creatinine appearing 0.8-0.9, creatinine on admission slightly higher than baseline  Suspect in setting of obstructing stone, continue IV fluids and plan for urology consultation as above.  Repeat BMP in a.m.  Avoid/limit nephrotoxins and hypotension  Holding ARB     VTE Pharmacologic Prophylaxis: VTE Score: 4 Moderate Risk (Score 3-4) - Pharmacological DVT Prophylaxis Contraindicated. Sequential Compression Devices Ordered.    Mobility:   Basic Mobility Inpatient Raw Score: 24  JH-HLM Goal: 8: Walk 250 feet or more  JH-HLM Achieved: 6: Walk 10 steps or more  JH-HLM Goal NOT achieved.  Continue with multidisciplinary rounding and encourage appropriate mobility to improve upon -[facility] goals.    Patient Centered Rounds: I performed bedside rounds with nursing staff today.   Discussions with Specialists or Other Care Team Provider:     Education and Discussions with Family / Patient: Patient declined call to .     Current Length of Stay: 1 day(s)  Current Patient Status: Inpatient   Certification Statement: The patient will continue to require additional inpatient hospital stay due to hydronephrosis, ureteral stone pending OR with urology   Discharge Plan: Anticipate discharge in 24-48 hrs to home.    Code Status: Level 1 - Full Code    Subjective   Feeling anxious regarding surgery today.  Hungry and thirsty.  Otherwise no complaints     Objective     Vitals:   Temp (24hrs), Av.5 °F (36.9 °C), Min:98.4 °F (36.9 °C), Max:98.8 °F (37.1 °C)    Temp:  [98.4 °F (36.9 °C)-98.8 °F (37.1 °C)] 98.8 °F (37.1 °C)  HR:  [81-87] 87  Resp:  [16] 16  BP: (134-144)/(73-89) 134/74  SpO2:  [97 %] 97 %  There is no height or weight on file to calculate BMI.     Input and Output Summary (last 24 hours):     Intake/Output Summary (Last 24 hours) at 2024 1355  Last data filed at 2024 0745  Gross per 24 hour   Intake 0 ml   Output 300 ml   Net -300 ml       Physical Exam  Vitals reviewed.   Constitutional:       General: She is not in acute distress.     Appearance: She is not toxic-appearing.   HENT:      Head: Normocephalic and atraumatic.   Eyes:      Extraocular Movements: Extraocular movements intact.   Cardiovascular:      Rate and Rhythm: Normal rate and regular rhythm.   Pulmonary:      Effort: Pulmonary effort is normal. No respiratory distress.      Breath sounds: Normal breath sounds.   Abdominal:      General: Bowel sounds are normal. There is no distension.      Palpations: Abdomen is soft.   Musculoskeletal:         General: Normal range of motion.   Neurological:      General: No  focal deficit present.      Mental Status: She is alert and oriented to person, place, and time.   Psychiatric:         Mood and Affect: Mood normal.         Behavior: Behavior normal.         Thought Content: Thought content normal.        Lines/Drains:  Lines/Drains/Airways       Active Status       None                            Lab Results: I have reviewed the following results:    Results from last 7 days   Lab Units 09/25/24  0447 09/24/24  1329   WBC Thousand/uL 9.91 9.48   HEMOGLOBIN g/dL 10.4* 12.3   HEMATOCRIT % 31.1* 37.1   PLATELETS Thousands/uL 269 301   SEGS PCT %  --  80*   LYMPHO PCT %  --  12*   MONO PCT %  --  8   EOS PCT %  --  0     Results from last 7 days   Lab Units 09/25/24  0447 09/24/24  1329   SODIUM mmol/L 137 136   POTASSIUM mmol/L 3.8 3.7   CHLORIDE mmol/L 104 101   CO2 mmol/L 26 26   BUN mg/dL 20 24   CREATININE mg/dL 1.18 1.05   ANION GAP mmol/L 7 9   CALCIUM mg/dL 8.4 9.3   ALBUMIN g/dL  --  4.1   TOTAL BILIRUBIN mg/dL  --  0.50   ALK PHOS U/L  --  44   ALT U/L  --  10   AST U/L  --  15   GLUCOSE RANDOM mg/dL 98 107                 Results from last 7 days   Lab Units 09/24/24  1329   LACTIC ACID mmol/L 0.9       Recent Cultures (last 7 days):   Results from last 7 days   Lab Units 09/18/24  1413   URINE CULTURE  >100,000 cfu/ml Escherichia coli*  10,000-19,000 cfu/ml       Imaging Review: No pertinent imaging studies reviewed.  Other Studies: No additional pertinent studies reviewed.    Last 24 Hours Medication List:     Current Facility-Administered Medications:     acetaminophen (TYLENOL) tablet 650 mg, Q6H PRN    HYDROmorphone HCl (DILAUDID) injection 0.2 mg, Q4H PRN    LORazepam (ATIVAN) injection 0.5 mg, Once    multi-electrolyte (PLASMALYTE-A/ISOLYTE-S PH 7.4) IV solution, Continuous, Last Rate: 125 mL/hr (09/25/24 0002)    nicotine (NICODERM CQ) 7 mg/24hr TD 24 hr patch 1 patch, Daily    ondansetron (ZOFRAN) injection 4 mg, Q6H PRN    oxyCODONE (ROXICODONE) split tablet 2.5  mg, Q4H PRN **OR** oxyCODONE (ROXICODONE) IR tablet 5 mg, Q4H PRN    Administrative Statements   Today, Patient Was Seen By: Alanna Umanzor PA-C      **Please Note: This note may have been constructed using a voice recognition system.**

## 2024-09-25 NOTE — ASSESSMENT & PLAN NOTE
Baseline creatinine appearing 0.8-0.9, creatinine on admission slightly higher than baseline  Suspect in setting of obstructing stone, continue IV fluids and plan for urology consultation as above.  Repeat BMP in a.m.  Avoid/limit nephrotoxins and hypotension  Holding ARB

## 2024-09-25 NOTE — UTILIZATION REVIEW
Initial Clinical Review      Admission: Date/Time/Statement:   Admission Orders (From admission, onward)       Ordered        09/24/24 2317  INPATIENT ADMISSION  Once                          Orders Placed This Encounter   Procedures    INPATIENT ADMISSION     Standing Status:   Standing     Number of Occurrences:   1     Order Specific Question:   Level of Care     Answer:   Med Surg [16]     Order Specific Question:   Estimated length of stay     Answer:   More than 2 Midnights     Order Specific Question:   Certification     Answer:   I certify that inpatient services are medically necessary for this patient for a duration of greater than two midnights. See H&P and MD Progress Notes for additional information about the patient's course of treatment.     ED Arrival Information       Patient not seen in ED                       No chief complaint on file.      Initial Presentation: 69 y.o. female transferred from [facility] to [facility] due to + flank pain, nausea, chills, fatigue with history of recurrent UTI's the most recent  within the last month. She reports she is currently on her third antibiotic this month. She started Cefdinir on 9/20. Today she developed headache and nausea along with pressure along her abdomen. BP in the /84.  PMH: HTN, kidney stones, recurrent UTIs, h/o C.diff colitis. On exam: + abdominal tenderness over the suprapubic area. Pt is anxious. Abnormal labs/imaging: CT A/P reveals left ureteral calculus causing left hydronephrosis and acute left renal and perirenal inflammation. Also calculus right renal pelvis with mild hydronephrosis. Creatinine 1.09 slightly elevated from baseline. Urology Consulted. Plan: transfer to [facility] for medical management, antibiotics, fIVF's, NPO, for bilateral stent placement tomorrow 9/25/24 with formal Urology consult when arrived at [facility], repeat BMP in am.     Urology Note: Patient with UTI and  imaging findings of bilateral ureteral stones with hydronephrosis. OR tomorrow for bilateral ureteral stents.     Anticipated Length of Stay/Certification Statement: Patient will be admitted on an Inpatient basis with an anticipated length of stay of  greater than 2 midnights.       Date: 9/25/24   Day 2: Internal Medicine: strain urine, continue IVF's, U/a with occasional bacteria but 2-4 WBC's, continue pain relief meds and antiemetics.     9/25/24 Urology Note: continue IVF's, Continue empiric antibiotics until urine culture results, NPO for OR today for stent placement, continue to strain urine.     9/25/24 Op Note: Procedure(s):  Bilateral - CYSTOSCOPY RETROGRADE PYELOGRAM WITH INSERTION STENT URETERAL  Operative Findings:  Urine did not appear infected   Retrograde pyelograms did not show significant hydronephrosis on either side  Bilateral stents placed without difficulty      Date: 9/26/24  Day 3: Has surpassed a 2nd midnight with active treatments and services.    Urology Note: S/p bilateral ureteral stent placement 9/25. Vitals stable, afebrile. WBC today 9.41, creat 1.06. Will avoid pyridium for stent colic meds due to elevated creatinine. She will undergo definitive stone management at a later date.        ED Triage Vitals   Temperature Pulse Respirations Blood Pressure SpO2 Pain Score   09/24/24 2309 09/24/24 2311 09/25/24 1553 09/24/24 2309 09/24/24 2311 09/24/24 2311   98.4 °F (36.9 °C) 87 20 142/89 97 % No Pain     Weight (last 2 days)       None            Vital Signs (last 3 days)       Date/Time Temp Pulse Resp BP MAP (mmHg) SpO2 O2 Flow Rate (L/min) O2 Device Cardiac (WDL) Cassi Coma Scale Score Pain    09/26/24 08:00:46 98.3 °F (36.8 °C) -- 12 152/86 108 -- -- -- -- -- --    09/26/24 07:17:36 98.6 °F (37 °C) -- -- 139/84 102 -- -- -- -- -- --    09/25/24 2300 -- -- -- -- -- -- -- None (Room air) -- 15 No Pain    09/25/24 21:28:32 98.5 °F (36.9 °C) 88 -- 115/61 79 95 % -- -- -- -- --     09/25/24 20:57:44 -- 91 -- 184/91 122 95 % -- -- -- -- --    09/25/24 17:26:20 -- 78 -- 118/86 97 96 % -- -- -- -- --    09/25/24 16:54:45 -- 82 -- 152/92 112 95 % -- -- -- -- --    09/25/24 1630 97.1 °F (36.2 °C) 77 16 150/85 111 94 % -- None (Room air) WDL 15 No Pain    09/25/24 1615 -- 77 16 150/78 107 94 % -- None (Room air) -- -- No Pain    09/25/24 1600 -- 79 20 148/76 106 99 % -- None (Room air) -- 15 No Pain    09/25/24 1553 99 °F (37.2 °C) 88 20 163/83 117 98 % 6 L/min Simple mask X 8 --    09/25/24 0909 -- -- -- -- -- -- -- -- -- -- 8    09/25/24 0745 -- -- -- -- -- -- -- -- -- 15 --    09/25/24 07:38:04 98.8 °F (37.1 °C) -- -- 134/74 94 -- -- -- -- -- --    09/25/24 0012 -- -- -- -- -- -- -- None (Room air) -- -- --    09/24/24 23:11:57 98.4 °F (36.9 °C) 87 -- 142/89 107 97 % -- -- -- -- No Pain    09/24/24 23:09:45 98.4 °F (36.9 °C) -- -- 142/89 107 -- -- -- -- -- --                    Results from last 7 days   Lab Units 09/26/24  0511 09/25/24  0447 09/24/24  1329   WBC Thousand/uL 9.41 9.91 9.48   HEMOGLOBIN g/dL 10.2* 10.4* 12.3   HEMATOCRIT % 31.3* 31.1* 37.1   PLATELETS Thousands/uL 278 269 301   TOTAL NEUT ABS Thousands/µL 8.17*  --  7.52         Results from last 7 days   Lab Units 09/26/24  0511 09/25/24  0447 09/24/24  1329   SODIUM mmol/L 141 137 136   POTASSIUM mmol/L 3.9 3.8 3.7   CHLORIDE mmol/L 108 104 101   CO2 mmol/L 25 26 26   ANION GAP mmol/L 8 7 9   BUN mg/dL 19 20 24   CREATININE mg/dL 1.06 1.18 1.05   EGFR ml/min/1.73sq m 54 47 54   CALCIUM mg/dL 8.5 8.4 9.3   MAGNESIUM mg/dL  --   --  1.9     Results from last 7 days   Lab Units 09/24/24  1329   AST U/L 15   ALT U/L 10   ALK PHOS U/L 44   TOTAL PROTEIN g/dL 7.4   ALBUMIN g/dL 4.1   TOTAL BILIRUBIN mg/dL 0.50         Results from last 7 days   Lab Units 09/26/24  0511 09/25/24  0447 09/24/24  1329   GLUCOSE RANDOM mg/dL 123 98 107               Results from last 7 days   Lab Units 09/24/24  1329   LACTIC ACID mmol/L 0.9                        Results from last 7 days   Lab Units 09/24/24  1329   LIPASE u/L 31                 Results from last 7 days   Lab Units 09/24/24  1332   CLARITY UA  Clear   COLOR UA  Yellow   SPEC GRAV UA  1.020   PH UA  6.0   GLUCOSE UA mg/dl Negative   KETONES UA mg/dl Negative   BLOOD UA  Small*   PROTEIN UA mg/dl Trace*   NITRITE UA  Negative   BILIRUBIN UA  Negative   UROBILINOGEN UA (BE) mg/dl <2.0   LEUKOCYTES UA  Trace*   WBC UA /hpf 2-4   RBC UA /hpf 0-1   BACTERIA UA /hpf Occasional   EPITHELIAL CELLS WET PREP /hpf Occasional                None            Past Medical History:   Diagnosis Date    Allergic     seasonal    Hypertension     Kidney stone     Ovarian mass     Pelvic pain      Present on Admission:   Hydronephrosis with urinary obstruction due to ureteral calculus   Essential hypertension, benign   Elevated serum creatinine      Admitting Diagnosis: Nephrolithiasis [N20.0]  Age/Sex: 69 y.o. female    Scheduled Medications:  nicotine, 1 patch, Transdermal, Daily    acetaminophen (Ofirmev) injection 1,000 mg x 1 dose 9/24   Dose: 1,000 mg  Freq: Once Route: IV  Last Dose: Stopped (09/24/24 1349)  Start: 09/24/24 1330 End: 09/24/24 1349    Ketorolac injection 15 mg x 1 dose 9/24  Dose: 15 mg  Freq: Once Route: IV  Start: 09/24/24 1330 End: 09/24/24 1334  ondansetron (ZOFRAN) injection 4 mg x 1 dose 9/24   Dose: 4 mg  Freq: Once Route: IV  Start: 09/24/24 1330 End: 09/24/24 1334      Continuous IV Infusions:  multi-electrolyte, 125 mL/hr, Intravenous, Continuous      PRN Meds:  acetaminophen, 650 mg, Oral, Q6H PRN  HYDROmorphone, 0.2 mg, Intravenous, Q4H PRN  ondansetron, 4 mg, Intravenous, Q6H PRN x 1 dose 9/25   oxyCODONE, 2.5 mg, Oral, Q4H PRN   Or  oxyCODONE, 5 mg, Oral, Q4H PRN        IP CONSULT TO UROLOGY    Network Utilization Review Department  ATTENTION: Please call with any questions or concerns to [phone] and carefully listen to the prompts so that you are directed to the right person.  All voicemails are confidential.   For Discharge needs, contact Care Management DC Support Team at [phone] opt. [phone]  Send all requests for admission clinical reviews, approved or denied determinations and any other requests to dedicated fax number below belonging to the campus where the patient is receiving treatment. List of dedicated fax numbers for the Facilities:  FACILITY NAME UR FAX NUMBER   ADMISSION DENIALS (Administrative/Medical Necessity) [phone]   DISCHARGE SUPPORT TEAM (NETWORK) [phone]   PARENT CHILD HEALTH (Maternity/NICU/Pediatrics) [phone]   [facility] [phone]   [facility] [phone]   [facility] [phone]   [facility] [phone]   [facility] [phone]   [facility] [phone]   [facility] [phone]   [facility] [phone]   [facility] [phone]   [facility] [phone]   [facility] [phone]   [facility] [phone]

## 2024-09-25 NOTE — CONSULTS
CONSULT    Patient Name: Mookie Muhammad  Patient MRN: [id number]  Date of Service: 9/25/2024   Date / Time Note Created: 9/25/2024 5:00 AM   Referring Provider: El Anderson DO  Provider Creating Note: BRANDON Ballard    PCP: Jase García  Attending Provider:  El Anderson DO    Reason for Consult: Flank Pain    HPI:  Mookie Muhammad is a 69 y.o. female who has a past medical history significant for recurrent renal stones requiring multiple interventions the past, reported recurrent UTIs, hypertension who initially to the [facility] ED 9/24/2024 with complaints of generalized malaise, nausea without vomiting, and chronic right sided flank pain and intermittent left-sided flank pain.  Patient reports she has had multiple UTIs over the past several months requiring multiple antibiotic courses.   During ED workup at the [facility] ED her labs revealed an elevated creatinine compared to baseline and CT abdomen/pelvis concerning for hydronephrosis of the left kidney secondary to obstructing 3 mm proximal ureteral stone additionally with 15 mm right renal pelvis stone with mild hydronephrosis.  Initiation was transferred to hospitals for surgical intervention.      Patient is lying in bed in no acute distress.  She is reporting chronic right-sided flank pain and intermittent left flank pain with associated nausea currently.     Active Problems:    Patient Active Problem List   Diagnosis    Acute cystitis without hematuria    Other fatigue    Essential hypertension, benign    Pure hypercholesterolemia    Liver hemangioma    Pelvic mass    Vaccine counseling    Immunization not carried out because of patient decision    Mammogram declined    Ovarian mass, right    Seasonal allergic rhinitis due to pollen    Renal artery aneurysm (HCC)    Splenic artery aneurysm (HCC)    Encounter for long-term (current) use of medications    Encounter for hepatitis C screening test for low risk patient    Primary osteoarthritis  of right hip    Encounter for screening mammogram for malignant neoplasm of breast    Diarrhea    Change in bowel habits    Impaired fasting glucose    Hydronephrosis with urinary obstruction due to ureteral calculus    Elevated serum creatinine            Impressions  Left Ureteral Calculus  Right renal pelvis calculus  B/L Hydronephrosis  Renal Colic    Recommendations  1.  Continue IVFs   2. Analgesia/Narcotics   3. Anti-emetics   4. ATBs empirically while awaiting culture   5. Strain urine   6 NPO for OR for bilateral stent placement.    Explained risk, benefits and potential complications of ureteroscopic stone extraction. Patient has verbalized understanding of possible need for ureteral stent only for any signs of infection and/or technical difficult prohibiting stone retrieval etc.; requiring staged ureteroscopy electively once recovered and infection free as OP.  Consult on chart        Past Medical History:   Diagnosis Date    Allergic     seasonal    Hypertension     Kidney stone     Ovarian mass     Pelvic pain        Past Surgical History:   Procedure Laterality Date    COLONOSCOPY      EXTRACORPOREAL SHOCK WAVE LITHOTRIPSY      WV LAPS TOTAL HYSTERECT 250 GM/< W/RMVL TUBE/OVARY N/A 11/3/2023    Procedure: (LTH) W/ ROBOT, BILATERAL SALPINGO-OOPHORECTOMY, LYSIS OF ADHESIONS;  Surgeon: Pinead Montemayor MD;  Location: [facility];  Service: Gynecology Oncology       Family History   Problem Relation Age of Onset    No Known Problems Mother     Hypertension Father        Social History     Socioeconomic History    Marital status: /Civil Union     Spouse name: Not on file    Number of children: Not on file    Years of education: Not on file    Highest education level: Not on file   Occupational History    Not on file   Tobacco Use    Smoking status: Every Day     Current packs/day: 0.25     Average packs/day: 0.3 packs/day for 52.7 years (13.2 ttl pk-yrs)     Types: Cigarettes     Start date: 1972      Passive exposure: Current    Smokeless tobacco: Never   Vaping Use    Vaping status: Never Used   Substance and Sexual Activity    Alcohol use: Yes     Comment: rare    Drug use: Never    Sexual activity: Yes   Other Topics Concern    Not on file   Social History Narrative    Not on file     Social Determinants of Health     Financial Resource Strain: Low Risk  (3/1/2024)    Overall Financial Resource Strain (CARDIA)     Difficulty of Paying Living Expenses: Not hard at all   Food Insecurity: Not on file   Transportation Needs: No Transportation Needs (3/1/2024)    PRAPARE - Transportation     Lack of Transportation (Medical): No     Lack of Transportation (Non-Medical): No   Physical Activity: Not on file   Stress: Not on file   Social Connections: Not on file   Intimate Partner Violence: Not on file   Housing Stability: Not on file       Allergies   Allergen Reactions    Ciprofloxacin Itching     Itching and shaking, no breathing trouble    Sulfa Antibiotics Hives and Palpitations    Macrobid [Nitrofurantoin] Hives       Review of Systems  10 point review of systems negative except as noted in HPI  Constitutional:   positive for  - fatigue  Cardiovascular:   no chest pain or dyspnea on exertion  Gastrointestinal:   positive for - nausea/vomiting  Genito-Urinary:   Bilateral flank pain  Neurological:   negative     Chart Review   Allergies, current medications, history, problem list    Vital Signs  /89   Pulse 87   Temp 98.4 °F (36.9 °C)   LMP  (LMP Unknown)   SpO2 97%     Physical Exam  /89   Pulse 87   Temp 98.4 °F (36.9 °C)   LMP  (LMP Unknown)   SpO2 97%   General appearance: alert and oriented, in no acute distress  Neck: no adenopathy, no carotid bruit, no JVD, supple, symmetrical, trachea midline, and thyroid not enlarged, symmetric, no tenderness/mass/nodules  Back:  Right-sided CVA tenderness  Heart: regular rate and rhythm, S1, S2 normal, no murmur, click, rub or gallop  Abdomen: soft,  non-tender; bowel sounds normal; no masses,  no organomegaly  Extremities: extremities normal, warm and well-perfused; no cyanosis, clubbing, or edema  Pulses: 2+ and symmetric  Skin: Skin color, texture, turgor normal. No rashes or lesions  Neurologic: Grossly normal     Laboratory Studies  Lab Results   Component Value Date    HGBA1C 5.8 (H) 10/19/2023    K 3.7 09/24/2024     09/24/2024    CO2 26 09/24/2024    CREATININE 1.05 09/24/2024    BUN 24 09/24/2024    MG 1.9 09/24/2024     Lab Results   Component Value Date    WBC 9.48 09/24/2024    RBC 4.04 09/24/2024    HGB 12.3 09/24/2024    HCT 37.1 09/24/2024    MCV 92 09/24/2024    MCH 30.4 09/24/2024    RDW 12.3 09/24/2024     09/24/2024         Imaging and Other Studies  ).CT abdomen pelvis with contrast    Result Date: 9/24/2024  Narrative: CT ABDOMEN AND PELVIS WITH IV CONTRAST INDICATION: abd pain. . COMPARISON: CT abdomen pelvis March 9, 2023 TECHNIQUE: CT examination of the abdomen and pelvis was performed. Multiplanar 2D reformatted images were created from the source data. This examination, like all CT scans performed in the [facility], was performed utilizing techniques to minimize radiation dose exposure, including the use of iterative reconstruction and automated exposure control. Radiation dose length product (DLP) for this visit: 609 mGy-cm IV Contrast: 100 mL of iohexol (OMNIPAQUE) Enteric Contrast: Not administered. FINDINGS: ABDOMEN LOWER CHEST: No clinically significant abnormality in the visualized lower chest. LIVER/BILIARY TREE: No surface nodularity. Unchanged 1.6 cm hepatic segment 7 hemangioma. Unchanged 1.9 cm hepatic segment 5 hemangioma. Patent hepatic and portal veins. No biliary ductal dilation. GALLBLADDER: No calcified gallstones. No pericholecystic inflammatory change. SPLEEN: Few subcentimeter hypoattenuating lesions may be hemangiomas. PANCREAS: Unremarkable. ADRENAL GLANDS: Unremarkable.  KIDNEYS/URETERS: Delayed enhancement of the left kidney. Left perinephric fat stranding. 3 mm proximal left ureteral calculus. Moderate left hydronephrosis. 15 mm calculus in the right renal pelvis; enhancement of the adjacent urothelium persist though has decreased compared to prior study. Mild right hydronephrosis. Few calculi in the lower pole of right kidney measuring up to 7 mm. Bilateral renal cysts. STOMACH AND BOWEL: Small hiatal hernia. No bowel obstruction. APPENDIX: No findings to suggest appendicitis. ABDOMINOPELVIC CAVITY: No ascites. No pneumoperitoneum. No lymphadenopathy. VESSELS: Atherosclerosis without aortic aneurysm. 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 PELVIS REPRODUCTIVE ORGANS: Post hysterectomy and bilateral salpingo-oophorectomy. URINARY BLADDER: Punctate focus of air in the urinary bladder. No calculi. ABDOMINAL WALL/INGUINAL REGIONS: Unremarkable. BONES: No acute fracture or suspicious osseous lesion. Spinal degenerative changes.     Impression: 1.  3 mm proximal left ureteral calculus eliciting moderate left hydronephrosis and acute left renal and perirenal inflammation 2.  15 mm calculus at the right renal pelvis with mild hydronephrosis. Mild enhancement of the adjacent urothelium can be indicative of chronic inflammation. Workstation performed: [id number]       Medications   Current Facility-Administered Medications   Medication Dose Route Frequency Provider Last Rate    acetaminophen  650 mg Oral Q6H PRN El Justin, DO      HYDROmorphone  0.2 mg Intravenous Q4H PRN El Justin, DO      multi-electrolyte  125 mL/hr Intravenous Continuous El Justin,  mL/hr (09/25/24 0002)    nicotine  1 patch Transdermal Daily El Justin, DO      ondansetron  4 mg Intravenous Q6H PRN El Justin, DO      oxyCODONE  2.5 mg Oral Q4H PRN El Justin, DO      Or    oxyCODONE  5 mg  Oral Q4H PRN El Anderson, BRANDON Desir

## 2024-09-26 VITALS
HEART RATE: 88 BPM | SYSTOLIC BLOOD PRESSURE: 129 MMHG | RESPIRATION RATE: 12 BRPM | OXYGEN SATURATION: 95 % | DIASTOLIC BLOOD PRESSURE: 72 MMHG | TEMPERATURE: 98.3 F

## 2024-09-26 LAB
ANION GAP SERPL CALCULATED.3IONS-SCNC: 8 MMOL/L (ref 4–13)
BASOPHILS # BLD AUTO: 0.01 THOUSANDS/ΜL (ref 0–0.1)
BASOPHILS NFR BLD AUTO: 0 % (ref 0–1)
BUN SERPL-MCNC: 19 MG/DL (ref 5–25)
CALCIUM SERPL-MCNC: 8.5 MG/DL (ref 8.4–10.2)
CHLORIDE SERPL-SCNC: 108 MMOL/L (ref 96–108)
CO2 SERPL-SCNC: 25 MMOL/L (ref 21–32)
CREAT SERPL-MCNC: 1.06 MG/DL (ref 0.6–1.3)
EOSINOPHIL # BLD AUTO: 0 THOUSAND/ΜL (ref 0–0.61)
EOSINOPHIL NFR BLD AUTO: 0 % (ref 0–6)
ERYTHROCYTE [DISTWIDTH] IN BLOOD BY AUTOMATED COUNT: 12.4 % (ref 11.6–15.1)
GFR SERPL CREATININE-BSD FRML MDRD: 54 ML/MIN/1.73SQ M
GLUCOSE SERPL-MCNC: 123 MG/DL (ref 65–140)
HCT VFR BLD AUTO: 31.3 % (ref 34.8–46.1)
HGB BLD-MCNC: 10.2 G/DL (ref 11.5–15.4)
IMM GRANULOCYTES # BLD AUTO: 0.04 THOUSAND/UL (ref 0–0.2)
IMM GRANULOCYTES NFR BLD AUTO: 0 % (ref 0–2)
LYMPHOCYTES # BLD AUTO: 0.87 THOUSANDS/ΜL (ref 0.6–4.47)
LYMPHOCYTES NFR BLD AUTO: 9 % (ref 14–44)
MCH RBC QN AUTO: 29.6 PG (ref 26.8–34.3)
MCHC RBC AUTO-ENTMCNC: 32.6 G/DL (ref 31.4–37.4)
MCV RBC AUTO: 91 FL (ref 82–98)
MONOCYTES # BLD AUTO: 0.32 THOUSAND/ΜL (ref 0.17–1.22)
MONOCYTES NFR BLD AUTO: 3 % (ref 4–12)
NEUTROPHILS # BLD AUTO: 8.17 THOUSANDS/ΜL (ref 1.85–7.62)
NEUTS SEG NFR BLD AUTO: 88 % (ref 43–75)
NRBC BLD AUTO-RTO: 0 /100 WBCS
PLATELET # BLD AUTO: 278 THOUSANDS/UL (ref 149–390)
PMV BLD AUTO: 9.2 FL (ref 8.9–12.7)
POTASSIUM SERPL-SCNC: 3.9 MMOL/L (ref 3.5–5.3)
RBC # BLD AUTO: 3.45 MILLION/UL (ref 3.81–5.12)
SODIUM SERPL-SCNC: 141 MMOL/L (ref 135–147)
WBC # BLD AUTO: 9.41 THOUSAND/UL (ref 4.31–10.16)

## 2024-09-26 PROCEDURE — 99232 SBSQ HOSP IP/OBS MODERATE 35: CPT | Performed by: UROLOGY

## 2024-09-26 PROCEDURE — 80048 BASIC METABOLIC PNL TOTAL CA: CPT | Performed by: PHYSICIAN ASSISTANT

## 2024-09-26 PROCEDURE — 85025 COMPLETE CBC W/AUTO DIFF WBC: CPT | Performed by: PHYSICIAN ASSISTANT

## 2024-09-26 PROCEDURE — 99239 HOSP IP/OBS DSCHRG MGMT >30: CPT | Performed by: PHYSICIAN ASSISTANT

## 2024-09-26 RX ORDER — ALFUZOSIN HYDROCHLORIDE 10 MG/1
10 TABLET, EXTENDED RELEASE ORAL DAILY
Qty: 30 TABLET | Refills: 1 | Status: SHIPPED | OUTPATIENT
Start: 2024-09-26

## 2024-09-26 RX ORDER — VALSARTAN 80 MG/1
80 TABLET ORAL DAILY
Start: 2024-09-26

## 2024-09-26 RX ORDER — OXYBUTYNIN CHLORIDE 5 MG/1
5 TABLET ORAL 3 TIMES DAILY PRN
Qty: 30 TABLET | Refills: 1 | Status: SHIPPED | OUTPATIENT
Start: 2024-09-26

## 2024-09-26 NOTE — DISCHARGE INSTR - AVS FIRST PAGE
You underwent cystoscopy (looking inside the bladder) and  ureteroscopy (looking inside the  ureter) and placement of ureteral stent.    WHAT IS A STENT?  At the end of the procedure, your doctor may place a stent into your ureter. A stent is a thin, flexible piece of plastic that will hold open your ureter. This allows your kidney to drain easily. The stent is about 12 inches long and looks and feels like a thin piece of spaghetti.    AFTER THE PROCEDURE  After the procedure you may experience the following symptoms. All of these are normal and should resolve within 1 or 2 days after your stent is removed.  1) Urinary frequency (urinating more often than usual)  2) Urinary urgency (the sensation that you need to urinate right away)  3) Painful urination (this can be pain in your bladder or in your back when  you urinate)  4) Blood in your urine ( a stent can irritate the lining of your bladder causing it to bleed)  5) Back/Flank pain, especially with urination    ° Your stent may cause you a lot of discomfort or blood in the urine this is normal  ° Please take oxybutynin 3 times a day as needed for discomfort  ° Please take Uroxatrol daily to help relax the ureter  ° You can take additional ibuprofen if needed for any discomfort  ° Make sure you are drinking at least 2 L of water a day unless you have a fluid restriction    General Instructions   1. Stent: You have a stent in your  ureter.    2. Bathing: You may shower and bathe as per usual.   3. Diet: You may resume your pre-operative diet   4. Activity: Walk as much as possible. No strenuous exercise or work for the first 5 days. From then on, slowly increase your level of activity back to normal.   5. If you currently smoke or use tobacco product, consider quitting. There are resources available to help you stop. Please ask your provider.   6. Report fever 101.5 or higher, pain not controlled by medications, or anything you believe warrants medical attention

## 2024-09-26 NOTE — PROGRESS NOTES
Progress Note - Urology   Name: Mookie Muhammad 69 y.o. female I MRN: [id number]  Unit/Bed#: -01 I Date of Admission: 9/24/2024   Date of Service: 9/26/2024 I Hospital Day: 2    Assessment & Plan  Hydronephrosis with urinary obstruction due to ureteral calculus  S/p bilateral ureteral stent placement 9/25  Afebrile, vital signs stable  WBC 9.41  Creat 1.06  Plan for definitive stone management interval  Stent colic medications (Uroxatrol due to sulfa allergy, oxybutynin, avoid Pyridium)-sent to pharmacy  Elevated serum creatinine  Creat 1.06, trending down status post bilateral stent placement  Avoid Pyridium due to creatinine    Ok for discharge from Urology service perspective. Please contact the SecureChat role for the Urology service with any questions/concerns.    History of Present Illness   S/p bilateral ureteral stent placement for a 3 mm left proximal ureteral stone and a 15 mm right renal stone with mild hydronephrosis and concerns for urinary tract infection with history of recurrent UTI.  She will undergo definitive stone management at a later date.    Objective      Temp:  [97.1 °F (36.2 °C)-99 °F (37.2 °C)] 98.3 °F (36.8 °C)  HR:  [77-91] 88  Resp:  [12-20] 12  BP: (115-184)/(61-92) 152/86  O2 Device: None (Room air)          I/O         09/24 0701  09/25 0700 09/25 0701  09/26 0700 09/26 0701  09/27 0700    P.O. 0      I.V.  300     IV Piggyback  100     Total Intake 0 400     Urine  700     Total Output  700     Net 0 -300                  Lines/Drains/Airways       Active Status       Name Placement date Placement time Site Days    Ureteral Internal Stent Left ureter 6 Fr. 09/25/24  1541  Left ureter  less than 1    Ureteral Internal Stent Right ureter 6 Fr. 09/25/24  1543  Right ureter  less than 1                  Physical Exam  Constitutional:       General: She is not in acute distress.     Appearance: Normal appearance. She is not ill-appearing, toxic-appearing or diaphoretic.   HENT:       Head: Normocephalic and atraumatic.   Eyes:      General: No scleral icterus.  Cardiovascular:      Rate and Rhythm: Normal rate.   Pulmonary:      Effort: Pulmonary effort is normal. No respiratory distress.   Abdominal:      General: Abdomen is flat. There is no distension.   Musculoskeletal:         General: No swelling.   Skin:     General: Skin is warm and dry.      Coloration: Skin is not jaundiced or pale.      Findings: No rash.   Neurological:      General: No focal deficit present.      Mental Status: She is alert and oriented to person, place, and time.      Gait: Gait normal.   Psychiatric:         Mood and Affect: Mood normal.         Behavior: Behavior normal.         Thought Content: Thought content normal.         Judgment: Judgment normal.          Lab Results: I have reviewed the following results: CBC/BMP:   .     09/26/24  0511   WBC 9.41   HGB 10.2*   HCT 31.3*      SODIUM 141   K 3.9      CO2 25   BUN 19   CREATININE 1.06   GLUC 123      Imaging Review: Reviewed radiology reports from this admission including: CT abdomen/pelvis.  Other Studies: No additional pertinent studies reviewed.    VTE Pharmacologic Prophylaxis: VTE covered by:    None     VTE Mechanical Prophylaxis: reason for no mechanical VTE prophylaxis patient ambulatory with plans for discharge

## 2024-09-26 NOTE — DISCHARGE SUMMARY
Discharge Summary - Hospitalist   Name: Mookie Muhammad 69 y.o. female I MRN: [id number]  Unit/Bed#: -01 I Date of Admission: 9/24/2024   Date of Service: 9/26/2024 I Hospital Day: 2     Assessment & Plan  Hydronephrosis with urinary obstruction due to ureteral calculus  Patient with prior history of renal stones and recurrent UTIs presenting initially to the [facility] ED with ongoing malaise despite multiple rounds of antibiotics as outpatient for apparent acute cystitis  CT abdomen/pelvis at the outside campus concerning for a 3 mm proximal left ureteral calculus with moderate left hydronephrosis and acute left renal/perirenal inflammation additionally pulmonary calculus at the right renal pelvis with mild hydronephrosis with mild enhancement of the adjacent urothelium suspected chronic inflammation.  Labs with creatinine somewhat elevated from recent values, UA with occasional bacteria but 2-4 WBCs  Monitor off any further abx at this time as no evidence of UTI   EM physician at the outside campus discussed with urology on-call, transferred to his hospital for urology evaluation/intervention advised.   Urology consulted, s/p bilateral ureteral stent placement. Outpt f/u for stone management.  Oxybutynin, uroxatrol at discharge.   Essential hypertension, benign  Hold ARB with elevated Cr -- d/w pt can resume in 24 hrs   Elevated serum creatinine  Baseline creatinine appearing 0.8-0.9, creatinine on admission slightly higher than baseline  Suspect in setting of obstructing stone  Improving following urologic intervention   Holding ARB -- d/w pt can resume in 24 hrs      Medical Problems       Resolved Problems  Date Reviewed: 9/25/2024   None       Discharging Physician / Practitioner: Alanna Umanzor PA-C  PCP: Jase García DO  Admission Date:   Admission Orders (From admission, onward)       Ordered        09/24/24 2317  INPATIENT ADMISSION  Once                          Discharge Date:  09/26/24    Consultations During Hospital Stay:  Urology     Procedures Performed:   9/26: cystoscopy, ureteroscopy, b/l ureteral stent placement     Significant Findings / Test Results:   CT a/p: 3 mm poximal L ureteral calculus with moderate L hydronephrosis and acute L renal and perirenal inflammation.  15 mm calculus at R renal pelvis with mild hydronephrosis.  Mild enhancement of adjacent urothelium can be indicative of chronic inflammation     Incidental Findings:   None     Test Results Pending at Discharge (will require follow up):   None     Outpatient Tests Requested:  Urology stone surgery in a few weeks     Complications:  None    Reason for Admission: ureteral stone     Hospital Course:   Mookie Muhammad is a 69 y.o. female patient who originally presented to the hospital on 9/24/2024 due to L sided flank pain, malaise, nausea.  CT imaging with b/l hydro and L ureteral calculus.  Noted to have elevated Cr from baseline.      She was tx to [facility] for urologic intervention.  She is s/p bilateral ureteral stent placement.  UA negative, urine did not appear infected intra-op.  Urology will follow outpt for definitive stone management.    She is feeling well today, she notes some urgency following stent placement, along with some bleeding that is improving.  Urology cleared for d/c.  She is stable for discharge .    Please see above list of diagnoses and related plan for additional information.     Condition at Discharge: good    Discharge Day Visit / Exam:   Subjective:  Having some urgency and   Vitals: Blood Pressure: 129/72 (09/26/24 1128)  Pulse: 88 (09/25/24 2128)  Temperature: 98.3 °F (36.8 °C) (09/26/24 0800)  Respirations: 12 (09/26/24 0800)  SpO2: 95 % (09/25/24 2128)  Exam:   Physical Exam  Vitals reviewed.   Constitutional:       General: She is not in acute distress.     Appearance: She is not toxic-appearing.   HENT:      Head: Normocephalic and atraumatic.   Eyes:      Extraocular Movements:  Extraocular movements intact.   Cardiovascular:      Rate and Rhythm: Regular rhythm.   Pulmonary:      Effort: Pulmonary effort is normal. No respiratory distress.   Musculoskeletal:         General: Normal range of motion.   Neurological:      General: No focal deficit present.      Mental Status: She is alert and oriented to person, place, and time.   Psychiatric:         Mood and Affect: Mood normal.         Behavior: Behavior normal.         Thought Content: Thought content normal.          Discussion with Family: Patient declined call to .     Discharge instructions/Information to patient and family:   See after visit summary for information provided to patient and family.      Provisions for Follow-Up Care:  See after visit summary for information related to follow-up care and any pertinent home health orders.      Mobility at time of Discharge:   Basic Mobility Inpatient Raw Score: 24  JH-HLM Goal: 8: Walk 250 feet or more  JH-HLM Achieved: 8: Walk 250 feet ot more  HLM Goal achieved. Continue to encourage appropriate mobility.     Disposition:   Home    Planned Readmission: no    Discharge Medications:  See after visit summary for reconciled discharge medications provided to patient and/or family.      Administrative Statements   Discharge Statement:  I have spent a total time of 35 minutes in caring for this patient on the day of the visit/encounter.     **Please Note: This note may have been constructed using a voice recognition system**

## 2024-09-26 NOTE — PLAN OF CARE
Problem: PAIN - ADULT  Goal: Verbalizes/displays adequate comfort level or baseline comfort level  Description: Interventions:  - Encourage patient to monitor pain and request assistance  - Assess pain using appropriate pain scale  - Administer analgesics based on type and severity of pain and evaluate response  - Implement non-pharmacological measures as appropriate and evaluate response  - Consider cultural and social influences on pain and pain management  - Notify physician/advanced practitioner if interventions unsuccessful or patient reports new pain  Outcome: Progressing     Problem: INFECTION - ADULT  Goal: Absence or prevention of progression during hospitalization  Description: INTERVENTIONS:  - Assess and monitor for signs and symptoms of infection  - Monitor lab/diagnostic results  - Monitor all insertion sites, i.e. indwelling lines, tubes, and drains  - Monitor endotracheal if appropriate and nasal secretions for changes in amount and color  - [place] appropriate cooling/warming therapies per order  - Administer medications as ordered  - Instruct and encourage patient and family to use good hand hygiene technique  - Identify and instruct in appropriate isolation precautions for identified infection/condition  Outcome: Progressing     Problem: SAFETY ADULT  Goal: Patient will remain free of falls  Description: INTERVENTIONS:  - Educate patient/family on patient safety including physical limitations  - Instruct patient to call for assistance with activity   - Consult OT/PT to assist with strengthening/mobility   - Keep Call bell within reach  - Keep bed low and locked with side rails adjusted as appropriate  - Keep care items and personal belongings within reach  - Initiate and maintain comfort rounds  - Make Fall Risk Sign visible to staff  - Offer Toileting every 2 Hours, in advance of need  - Initiate/Maintain bed alarm  - Obtain necessary fall risk management equipment: yellow socks  - Apply  yellow socks and bracelet for high fall risk patients  - Consider moving patient to room near nurses station  Outcome: Progressing

## 2024-09-26 NOTE — ASSESSMENT & PLAN NOTE
Patient with prior history of renal stones and recurrent UTIs presenting initially to the [facility] ED with ongoing malaise despite multiple rounds of antibiotics as outpatient for apparent acute cystitis  CT abdomen/pelvis at the outside campus concerning for a 3 mm proximal left ureteral calculus with moderate left hydronephrosis and acute left renal/perirenal inflammation additionally pulmonary calculus at the right renal pelvis with mild hydronephrosis with mild enhancement of the adjacent urothelium suspected chronic inflammation.  Labs with creatinine somewhat elevated from recent values, UA with occasional bacteria but 2-4 WBCs  Monitor off any further abx at this time as no evidence of UTI   EM physician at the outside campus discussed with urology on-call, transferred to his hospital for urology evaluation/intervention advised.   Urology consulted, s/p bilateral ureteral stent placement. Outpt f/u for stone management.  Oxybutynin, uroxatrol at discharge.

## 2024-09-26 NOTE — ASSESSMENT & PLAN NOTE
Baseline creatinine appearing 0.8-0.9, creatinine on admission slightly higher than baseline  Suspect in setting of obstructing stone  Improving following urologic intervention   Holding ARB -- d/w pt can resume in 24 hrs

## 2024-09-26 NOTE — ASSESSMENT & PLAN NOTE
S/p bilateral ureteral stent placement 9/25  Afebrile, vital signs stable  WBC 9.41  Creat 1.06  Plan for definitive stone management interval  Stent colic medications (Uroxatrol due to sulfa allergy, oxybutynin, avoid Pyridium)-sent to pharmacy

## 2024-09-27 ENCOUNTER — TRANSITIONAL CARE MANAGEMENT (OUTPATIENT)
Dept: FAMILY MEDICINE CLINIC | Facility: CLINIC | Age: 69
End: 2024-09-27

## 2024-09-27 NOTE — TELEPHONE ENCOUNTER
"Post Op Note    Mookie Muhammad is a 69 y.o. female s/p bilateral stent insertion  performed 09/25/2024.  Mookie Muhammad is a patient of Dr. Dr. Palacio and is seen at the [place] office.     How would you rate your pain on a scale from 1 to 10, 10 being the worst pain ever? 0  Have you had a fever? No    Have your bowel movements been regular? Yes  Do you have any difficulty urinating? No    Do you have any other questions or concerns that I can address at this time?   Contacted and spoke with the patient who states she is feeling \"pretty good\".  Patient denies renal colic but is bothered by frequent urination which she understands is normal.    Patient eating and drinking. Patient aware of adequate hydration with water. Bowels normal.   Patient aware she needs a second surgery. Patient requesting to see Dr Palacio prior to her second surgery.    Patient made aware Sue VICENTE will contact her with a date and once the date is set, the office will call to schedule an appointment with Dr Palacio in the [place] office.  "

## 2024-09-27 NOTE — TELEPHONE ENCOUNTER
Pt called and stated she was told to schedule a post op visit with  specifically. Please review for appropriate appointment.     Call back: [phone]

## 2024-09-27 NOTE — UTILIZATION REVIEW
NOTIFICATION OF ADMISSION DISCHARGE   This is a Notification of Discharge from [facility]. Please be advised that this patient has been discharge from our facility. Below you will find the admission and discharge date and time including the patient’s disposition.   UTILIZATION REVIEW CONTACT:  Linnea Collins  Utilization   Network Utilization Review Department  Phone: [phone] x carefully listen to the prompts. All voicemails are confidential.  Email: [email or web address]     ADMISSION INFORMATION  PRESENTATION DATE: 9/24/2024 11:05 PM  OBERVATION ADMISSION DATE: N/A  INPATIENT ADMISSION DATE: 9/24/24 11:05 PM   DISCHARGE DATE: 9/26/2024  3:11 PM   DISPOSITION:Home/Self Care    Network Utilization Review Department  ATTENTION: Please call with any questions or concerns to [phone] and carefully listen to the prompts so that you are directed to the right person. All voicemails are confidential.   For Discharge needs, contact Care Management DC Support Team at [phone] opt. 2  Send all requests for admission clinical reviews, approved or denied determinations and any other requests to dedicated fax number below belonging to the campus where the patient is receiving treatment. List of dedicated fax numbers for the Facilities:  FACILITY NAME UR FAX NUMBER   ADMISSION DENIALS (Administrative/Medical Necessity) [phone]   DISCHARGE SUPPORT TEAM ([facility]) [phone]   PARENT CHILD HEALTH (Maternity/NICU/Pediatrics) [phone]   [facility] [phone]   [facility] [phone]   [facility] [phone]   [facility] [phone]   [facility] [phone]   [facility] [phone]   [facility] [phone]   [facility] [phone]    [facility] [phone]   [facility] [phone]   [facility] [phone]   [facility] [phone]

## 2024-10-01 NOTE — TELEPHONE ENCOUNTER
Patient calling in to schedule next surgery with ss. Requesting phone call back. Patient made aware of turn around time for scheduling.     CB: [phone]

## 2024-10-02 NOTE — TELEPHONE ENCOUNTER
Patient called in requesting advice on what to do about her excessive belching and heartburn. I recommended OTC Tums and gas x. Patient also stated she is constipated and is taking colace. I encouraged eating high fiber foods and increasing her water intake. Patient appreciative of information.

## 2024-10-04 ENCOUNTER — TELEPHONE (OUTPATIENT)
Dept: UROLOGY | Facility: AMBULATORY SURGERY CENTER | Age: 69
End: 2024-10-04

## 2024-10-04 ENCOUNTER — PREP FOR PROCEDURE (OUTPATIENT)
Dept: UROLOGY | Facility: AMBULATORY SURGERY CENTER | Age: 69
End: 2024-10-04

## 2024-10-04 DIAGNOSIS — R39.9 UTI SYMPTOMS: Primary | ICD-10-CM

## 2024-10-04 DIAGNOSIS — R39.89 SUSPECTED UTI: ICD-10-CM

## 2024-10-04 DIAGNOSIS — N39.0 RECURRENT UTI: Primary | ICD-10-CM

## 2024-10-04 DIAGNOSIS — Z01.810 PRE-OPERATIVE CARDIOVASCULAR EXAMINATION: ICD-10-CM

## 2024-10-04 NOTE — TELEPHONE ENCOUNTER
Called patient to schedule surgery . Patient was unable to answer but I was able to leave a voicemail asking patient to please give the office a call back to schedule. Office number was provided.

## 2024-10-04 NOTE — TELEPHONE ENCOUNTER
Patient called for SS. I told her she would get a call back. She said she'll be home for the next 2 hours.

## 2024-10-06 PROBLEM — N30.00 ACUTE CYSTITIS WITHOUT HEMATURIA: Status: RESOLVED | Noted: 2022-04-25 | Resolved: 2024-10-06

## 2024-10-07 ENCOUNTER — APPOINTMENT (OUTPATIENT)
Dept: LAB | Facility: CLINIC | Age: 69
End: 2024-10-07
Payer: COMMERCIAL

## 2024-10-07 DIAGNOSIS — Z79.899 ENCOUNTER FOR LONG-TERM (CURRENT) USE OF MEDICATIONS: ICD-10-CM

## 2024-10-07 DIAGNOSIS — E78.00 PURE HYPERCHOLESTEROLEMIA: ICD-10-CM

## 2024-10-07 LAB
ALBUMIN SERPL BCG-MCNC: 4 G/DL (ref 3.5–5)
ALP SERPL-CCNC: 41 U/L (ref 34–104)
ALT SERPL W P-5'-P-CCNC: 11 U/L (ref 7–52)
ANION GAP SERPL CALCULATED.3IONS-SCNC: 9 MMOL/L (ref 4–13)
AST SERPL W P-5'-P-CCNC: 19 U/L (ref 13–39)
BACTERIA UR QL AUTO: ABNORMAL /HPF
BASOPHILS # BLD AUTO: 0.05 THOUSANDS/ΜL (ref 0–0.1)
BASOPHILS NFR BLD AUTO: 1 % (ref 0–1)
BILIRUB SERPL-MCNC: 0.53 MG/DL (ref 0.2–1)
BILIRUB UR QL STRIP: NEGATIVE
BUN SERPL-MCNC: 23 MG/DL (ref 5–25)
CALCIUM SERPL-MCNC: 9.1 MG/DL (ref 8.4–10.2)
CHLORIDE SERPL-SCNC: 107 MMOL/L (ref 96–108)
CHOLEST SERPL-MCNC: 255 MG/DL
CLARITY UR: ABNORMAL
CO2 SERPL-SCNC: 24 MMOL/L (ref 21–32)
COLOR UR: ABNORMAL
CREAT SERPL-MCNC: 0.79 MG/DL (ref 0.6–1.3)
EOSINOPHIL # BLD AUTO: 0.26 THOUSAND/ΜL (ref 0–0.61)
EOSINOPHIL NFR BLD AUTO: 4 % (ref 0–6)
ERYTHROCYTE [DISTWIDTH] IN BLOOD BY AUTOMATED COUNT: 13.2 % (ref 11.6–15.1)
EST. AVERAGE GLUCOSE BLD GHB EST-MCNC: 123 MG/DL
GFR SERPL CREATININE-BSD FRML MDRD: 77 ML/MIN/1.73SQ M
GLUCOSE P FAST SERPL-MCNC: 94 MG/DL (ref 65–99)
GLUCOSE UR STRIP-MCNC: NEGATIVE MG/DL
HBA1C MFR BLD: 5.9 %
HCT VFR BLD AUTO: 37.6 % (ref 34.8–46.1)
HDLC SERPL-MCNC: 49 MG/DL
HGB BLD-MCNC: 12.2 G/DL (ref 11.5–15.4)
HGB UR QL STRIP.AUTO: ABNORMAL
HYALINE CASTS #/AREA URNS LPF: ABNORMAL /LPF
IMM GRANULOCYTES # BLD AUTO: 0.01 THOUSAND/UL (ref 0–0.2)
IMM GRANULOCYTES NFR BLD AUTO: 0 % (ref 0–2)
KETONES UR STRIP-MCNC: NEGATIVE MG/DL
LDLC SERPL CALC-MCNC: 187 MG/DL (ref 0–100)
LEUKOCYTE ESTERASE UR QL STRIP: ABNORMAL
LYMPHOCYTES # BLD AUTO: 2.02 THOUSANDS/ΜL (ref 0.6–4.47)
LYMPHOCYTES NFR BLD AUTO: 28 % (ref 14–44)
MCH RBC QN AUTO: 30.7 PG (ref 26.8–34.3)
MCHC RBC AUTO-ENTMCNC: 32.4 G/DL (ref 31.4–37.4)
MCV RBC AUTO: 95 FL (ref 82–98)
MONOCYTES # BLD AUTO: 0.54 THOUSAND/ΜL (ref 0.17–1.22)
MONOCYTES NFR BLD AUTO: 8 % (ref 4–12)
MUCOUS THREADS UR QL AUTO: ABNORMAL
NEUTROPHILS # BLD AUTO: 4.26 THOUSANDS/ΜL (ref 1.85–7.62)
NEUTS SEG NFR BLD AUTO: 59 % (ref 43–75)
NITRITE UR QL STRIP: POSITIVE
NON-SQ EPI CELLS URNS QL MICRO: ABNORMAL /HPF
NRBC BLD AUTO-RTO: 0 /100 WBCS
PH UR STRIP.AUTO: 6 [PH]
PLATELET # BLD AUTO: 407 THOUSANDS/UL (ref 149–390)
PMV BLD AUTO: 10.1 FL (ref 8.9–12.7)
POTASSIUM SERPL-SCNC: 4 MMOL/L (ref 3.5–5.3)
PROT SERPL-MCNC: 6.8 G/DL (ref 6.4–8.4)
PROT UR STRIP-MCNC: ABNORMAL MG/DL
RBC # BLD AUTO: 3.98 MILLION/UL (ref 3.81–5.12)
RBC #/AREA URNS AUTO: ABNORMAL /HPF
SODIUM SERPL-SCNC: 140 MMOL/L (ref 135–147)
SP GR UR STRIP.AUTO: 1.01 (ref 1–1.03)
TRIGL SERPL-MCNC: 94 MG/DL
UROBILINOGEN UR STRIP-ACNC: <2 MG/DL
WBC # BLD AUTO: 7.14 THOUSAND/UL (ref 4.31–10.16)
WBC #/AREA URNS AUTO: ABNORMAL /HPF
WBC CLUMPS # UR AUTO: PRESENT /UL

## 2024-10-07 PROCEDURE — 87077 CULTURE AEROBIC IDENTIFY: CPT

## 2024-10-07 PROCEDURE — 87186 SC STD MICRODIL/AGAR DIL: CPT

## 2024-10-07 PROCEDURE — 81001 URINALYSIS AUTO W/SCOPE: CPT

## 2024-10-07 PROCEDURE — 87086 URINE CULTURE/COLONY COUNT: CPT

## 2024-10-07 PROCEDURE — 85025 COMPLETE CBC W/AUTO DIFF WBC: CPT

## 2024-10-07 PROCEDURE — 80061 LIPID PANEL: CPT

## 2024-10-07 PROCEDURE — 36415 COLL VENOUS BLD VENIPUNCTURE: CPT

## 2024-10-07 PROCEDURE — 80053 COMPREHEN METABOLIC PANEL: CPT

## 2024-10-07 NOTE — ADDENDUM NOTE
Addended by: PARTH JACKSON on: 10/7/2024 08:23 AM     Modules accepted: Orders     Detail Level: Simple Additional Notes: Patient consent was obtained to proceed with the visit and recommended plan of care after discussion of all risks and benefits, including the risks of COVID-19 exposure.

## 2024-10-07 NOTE — TELEPHONE ENCOUNTER
Patient called in with concerns of a possible UTI. States she is starting to experience dysuria, urgency, frequency, cloudy urine and chills. Denies any fevers or hematuria. Patient recently had CYSTOSCOPY RETROGRADE PYELOGRAM WITH INSERTION STENT URETERAL (Bilateral: Bladder). Patient has a history of UTI's and is concerned this is the start of one. States she is drinking plenty of water. Advised to increase water intake and avoid bladder irritants. Reviewed normal post op and stent expectations. ED precautions reviewed with patient. Placed urine orders to rule out infection.         Reason for Disposition   The patient has an unknown case of mild dysuria    Answer Assessment - Initial Assessment Questions  1. When did your symptoms start?   Last night   2. Do you experience pain or burning with every void?   Starting to this morning   3. Do you have any other urinary symptoms such as urinary frequency, urgency, incontinence, blood in your urine?   Urgency, frequency   4. Do you have any abdominal pain or flank pain?  A little flank pain   5. Do you have a fever of 101 or higher? How did you take your temperature?   No  7. Have you become unable to urinate?   No  8. Do you have a history of urinary tract infections?   Yes  9. Have you had a recent urologic procedure, surgery, or any recent urine testing?   Yes CYSTOSCOPY RETROGRADE PYELOGRAM WITH INSERTION STENT URETERAL (Bilateral: Bladder)    Protocols used: Urology-Dysuria-ADULT-[place]

## 2024-10-08 ENCOUNTER — TELEPHONE (OUTPATIENT)
Dept: FAMILY MEDICINE CLINIC | Facility: CLINIC | Age: 69
End: 2024-10-08

## 2024-10-09 DIAGNOSIS — N39.0 URINARY TRACT INFECTION WITHOUT HEMATURIA, SITE UNSPECIFIED: Primary | ICD-10-CM

## 2024-10-09 LAB
BACTERIA UR CULT: ABNORMAL
BACTERIA UR CULT: ABNORMAL

## 2024-10-09 RX ORDER — CEPHALEXIN 500 MG/1
500 CAPSULE ORAL EVERY 6 HOURS SCHEDULED
Qty: 20 CAPSULE | Refills: 0 | Status: SHIPPED | OUTPATIENT
Start: 2024-10-09 | End: 2024-10-14

## 2024-10-09 NOTE — TELEPHONE ENCOUNTER
On Call AP Piger was messaged via secure chat and reviewed the results.     Will be calling in something for the patient. Pt allergies were shared with AP.    Patient was very appreciative.

## 2024-10-09 NOTE — TELEPHONE ENCOUNTER
Patient called to obtain urine results. Patient is c/o of being uncomfortable. States she saw results earlier today and thought the office would've reached out.    On Call AP was messaged via secure chat to have results reviewed and Rx called into pharmacy on file which closes at 7PM.     Patient states she's allergic to Sulfa drugs, Macrobid, and Cipro.

## 2024-10-20 DIAGNOSIS — N13.2 HYDRONEPHROSIS WITH URINARY OBSTRUCTION DUE TO URETERAL CALCULUS: ICD-10-CM

## 2024-10-21 ENCOUNTER — OFFICE VISIT (OUTPATIENT)
Dept: FAMILY MEDICINE CLINIC | Facility: CLINIC | Age: 69
End: 2024-10-21
Payer: COMMERCIAL

## 2024-10-21 VITALS
TEMPERATURE: 97.2 F | HEART RATE: 90 BPM | OXYGEN SATURATION: 98 % | WEIGHT: 133.9 LBS | SYSTOLIC BLOOD PRESSURE: 120 MMHG | DIASTOLIC BLOOD PRESSURE: 80 MMHG | BODY MASS INDEX: 22.31 KG/M2 | HEIGHT: 65 IN

## 2024-10-21 DIAGNOSIS — N30.00 ACUTE CYSTITIS WITHOUT HEMATURIA: ICD-10-CM

## 2024-10-21 DIAGNOSIS — N13.2 HYDRONEPHROSIS WITH URINARY OBSTRUCTION DUE TO URETERAL CALCULUS: ICD-10-CM

## 2024-10-21 DIAGNOSIS — E78.00 PURE HYPERCHOLESTEROLEMIA: ICD-10-CM

## 2024-10-21 DIAGNOSIS — I10 ESSENTIAL HYPERTENSION, BENIGN: Primary | ICD-10-CM

## 2024-10-21 PROCEDURE — G2211 COMPLEX E/M VISIT ADD ON: HCPCS | Performed by: FAMILY MEDICINE

## 2024-10-21 PROCEDURE — 99214 OFFICE O/P EST MOD 30 MIN: CPT | Performed by: FAMILY MEDICINE

## 2024-10-21 RX ORDER — ROSUVASTATIN CALCIUM 10 MG/1
10 TABLET, COATED ORAL DAILY
Qty: 100 TABLET | Refills: 3 | Status: SHIPPED | OUTPATIENT
Start: 2024-10-21

## 2024-10-21 RX ORDER — CEFPODOXIME PROXETIL 200 MG/1
200 TABLET, FILM COATED ORAL 2 TIMES DAILY
Qty: 20 TABLET | Refills: 0 | Status: SHIPPED | OUTPATIENT
Start: 2024-10-21 | End: 2024-10-31

## 2024-10-21 RX ORDER — ALFUZOSIN HYDROCHLORIDE 10 MG/1
10 TABLET, EXTENDED RELEASE ORAL DAILY
Qty: 90 TABLET | Refills: 1 | Status: SHIPPED | OUTPATIENT
Start: 2024-10-21

## 2024-10-21 NOTE — PROGRESS NOTES
Ambulatory Visit  Name: Mookie Muhammad      : 1955      MRN: [id number]  Encounter Provider: Jase García DO  Encounter Date: 10/21/2024   Encounter department: [facility] PRIMARY [facility]    Assessment & Plan  Acute cystitis without hematuria  I reviewed the patient's urine culture.  Urine culture was positive for E. coli and Enterobacter.  She is allergic to Cipro, sulfa, and Macrobid.  The E. coli was sensitive to the cephalexin but not the Enterobacter cloacae.  Due to her allergies, she is difficult to treat.  It looks like extended spectrum third-generation cephalosporins would be effective for this Enterobacter infection.  As such, I started her on Vantin 200 mg twice daily for 10 days.  I am going to recommend that once the patient is improved, we consider referral to an allergist to become desensitized to these antibiotics.  Otherwise, or going to develop problems with treating this patient due to resistance and lack of effective oral treatments.  Orders:    cefpodoxime (VANTIN) 200 mg tablet; Take 1 tablet (200 mg total) by mouth 2 (two) times a day for 10 days    Pure hypercholesterolemia  Patient has hyperlipidemia.  I reviewed her fasting labs.  Her fasting lipid panel shows a total cholesterol of 255.  Triglycerides are 94.  Her HDL is 49.  LDL was 187.  Her goal is less than 100.  I am recommending statin therapy for the patient.  I started the patient on rosuvastatin 10 mg daily.  I told her she can wait to take this until she completes her course of antibiotics.  Orders:    rosuvastatin (CRESTOR) 10 MG tablet; Take 1 tablet (10 mg total) by mouth daily    Essential hypertension, benign  Patient has hypertension which is well-controlled.  I checked her blood pressure myself today and found it to be 120/80.  As such, I am not going to change anything with her medication.  Continue valsartan 80 mg daily         Hydronephrosis with urinary obstruction due to ureteral calculus  I  "reviewed the patient's ER notes.  Patient will continue follow-up with urology            History of Present Illness     This patient is a 69-year-old white female who presents to the office today for her 6-week follow-up visit.  She is here for reevaluation of her blood pressure.  She tells me she has been checking her blood pressures at home and her blood pressures have been good.  She tells me she even had some that were quite low.  She did not have a logbook of her blood pressures with her.  Unfortunately, she has had a lot of problems since I last saw her.  She reports that on September 24 she woke up with left flank pain and shaking chills.  She was very pale.  She went to the emergency department and she was found to have a bilateral ureterolithiasis.  She tells me she had kidney stones in the past but this was 40 years ago.  She also developed a urinary tract infection.  She was upset because nobody called her with her urine culture results.  She finally had to call her urologist.  They called her in a prescription for Keflex and she tells me she is not better.  She is still experiencing UTI symptoms.  She also was not happy that she is unable to get in for her laser lithotripsy until November.          Review of Systems   Constitutional:  Negative for appetite change, chills and fever.   Gastrointestinal:  Negative for diarrhea, nausea and vomiting.   Genitourinary:  Positive for dysuria, frequency and urgency. Negative for flank pain and hematuria.   Neurological:  Negative for dizziness and headaches.           Objective     /80 (BP Location: Left arm, Patient Position: Sitting, Cuff Size: Standard)   Pulse 90   Temp (!) 97.2 °F (36.2 °C) (Tympanic)   Ht 5' 5\" (1.651 m)   Wt 60.7 kg (133 lb 14.4 oz)   LMP  (LMP Unknown)   SpO2 98%   BMI 22.28 kg/m²     Physical Exam  Vitals reviewed.   Constitutional:       Comments: The patient is a 69-year-old white female who appears her stated age.  She " is pleasant, cooperative, and in no distress.   HENT:      Head: Normocephalic and atraumatic.      Right Ear: Tympanic membrane, ear canal and external ear normal.      Left Ear: Tympanic membrane, ear canal and external ear normal.      Mouth/Throat:      Mouth: Mucous membranes are moist.      Pharynx: Oropharynx is clear. No oropharyngeal exudate or posterior oropharyngeal erythema.   Eyes:      General: No scleral icterus.        Right eye: No discharge.         Left eye: No discharge.      Conjunctiva/sclera: Conjunctivae normal.      Pupils: Pupils are equal, round, and reactive to light.   Cardiovascular:      Rate and Rhythm: Normal rate and regular rhythm.      Heart sounds: Normal heart sounds. No murmur heard.     No friction rub. No gallop.   Pulmonary:      Effort: Pulmonary effort is normal. No respiratory distress.      Breath sounds: Normal breath sounds. No stridor. No wheezing, rhonchi or rales.   Abdominal:      Tenderness: There is no right CVA tenderness or left CVA tenderness.   Musculoskeletal:      Cervical back: Neck supple.   Lymphadenopathy:      Cervical: No cervical adenopathy.     Extremities: Without cyanosis, clubbing, or edema

## 2024-10-21 NOTE — ASSESSMENT & PLAN NOTE
I reviewed the patient's urine culture.  Urine culture was positive for E. coli and Enterobacter.  She is allergic to Cipro, sulfa, and Macrobid.  The E. coli was sensitive to the cephalexin but not the Enterobacter cloacae.  Due to her allergies, she is difficult to treat.  It looks like extended spectrum third-generation cephalosporins would be effective for this Enterobacter infection.  As such, I started her on Vantin 200 mg twice daily for 10 days.  I am going to recommend that once the patient is improved, we consider referral to an allergist to become desensitized to these antibiotics.  Otherwise, or going to develop problems with treating this patient due to resistance and lack of effective oral treatments.  Orders:    cefpodoxime (VANTIN) 200 mg tablet; Take 1 tablet (200 mg total) by mouth 2 (two) times a day for 10 days

## 2024-10-21 NOTE — ASSESSMENT & PLAN NOTE
Patient has hypertension which is well-controlled.  I checked her blood pressure myself today and found it to be 120/80.  As such, I am not going to change anything with her medication.  Continue valsartan 80 mg daily

## 2024-10-21 NOTE — ASSESSMENT & PLAN NOTE
Patient has hyperlipidemia.  I reviewed her fasting labs.  Her fasting lipid panel shows a total cholesterol of 255.  Triglycerides are 94.  Her HDL is 49.  LDL was 187.  Her goal is less than 100.  I am recommending statin therapy for the patient.  I started the patient on rosuvastatin 10 mg daily.  I told her she can wait to take this until she completes her course of antibiotics.  Orders:    rosuvastatin (CRESTOR) 10 MG tablet; Take 1 tablet (10 mg total) by mouth daily

## 2024-10-29 NOTE — TELEPHONE ENCOUNTER
Chronic, at goal (stable), continue current treatment plan    Orders:    HYDROcodone-acetaminophen (NORCO)  MG per tablet; Take 1 tablet by mouth every 6 hours as needed for Pain for up to 30 days. Intended supply: 30 days    AMB POC DRUG SCREEN, URINE    HYDROcodone-acetaminophen (NORCO)  MG per tablet; Take 1 tablet by mouth every 6 hours as needed for Pain for up to 30 days. Max Daily Amount: 4 tablets    HYDROcodone-acetaminophen (NORCO)  MG per tablet; Take 1 tablet by mouth every 6 hours as needed for Pain for up to 30 days. Intended supply: 30 days     Called and left detailed message for patient  Informed patient that keflex was sent to the pharmacy  Office number left in message

## 2024-11-01 ENCOUNTER — APPOINTMENT (OUTPATIENT)
Dept: LAB | Facility: MEDICAL CENTER | Age: 69
End: 2024-11-01
Payer: COMMERCIAL

## 2024-11-01 ENCOUNTER — PROCEDURE VISIT (OUTPATIENT)
Dept: UROLOGY | Facility: CLINIC | Age: 69
End: 2024-11-01
Payer: COMMERCIAL

## 2024-11-01 ENCOUNTER — NURSE TRIAGE (OUTPATIENT)
Age: 69
End: 2024-11-01

## 2024-11-01 VITALS
SYSTOLIC BLOOD PRESSURE: 136 MMHG | OXYGEN SATURATION: 98 % | BODY MASS INDEX: 22.96 KG/M2 | DIASTOLIC BLOOD PRESSURE: 84 MMHG | HEART RATE: 90 BPM | HEIGHT: 65 IN | WEIGHT: 137.8 LBS | RESPIRATION RATE: 16 BRPM | TEMPERATURE: 98.3 F

## 2024-11-01 DIAGNOSIS — N20.0 NEPHROLITHIASIS: ICD-10-CM

## 2024-11-01 DIAGNOSIS — N39.0 RECURRENT UTI: ICD-10-CM

## 2024-11-01 DIAGNOSIS — R39.89 SUSPECTED UTI: ICD-10-CM

## 2024-11-01 DIAGNOSIS — N39.0 URINARY TRACT INFECTION WITHOUT HEMATURIA, SITE UNSPECIFIED: Primary | ICD-10-CM

## 2024-11-01 DIAGNOSIS — R39.9 UTI SYMPTOMS: ICD-10-CM

## 2024-11-01 LAB
BACTERIA UR QL AUTO: ABNORMAL /HPF
BILIRUB UR QL STRIP: NEGATIVE
CLARITY UR: ABNORMAL
COLOR UR: ABNORMAL
GLUCOSE UR STRIP-MCNC: NEGATIVE MG/DL
HGB UR QL STRIP.AUTO: ABNORMAL
KETONES UR STRIP-MCNC: NEGATIVE MG/DL
LEUKOCYTE ESTERASE UR QL STRIP: ABNORMAL
MUCOUS THREADS UR QL AUTO: ABNORMAL
NITRITE UR QL STRIP: NEGATIVE
NON-SQ EPI CELLS URNS QL MICRO: ABNORMAL /HPF
PH UR STRIP.AUTO: 6 [PH]
PROT UR STRIP-MCNC: ABNORMAL MG/DL
RBC #/AREA URNS AUTO: ABNORMAL /HPF
SP GR UR STRIP.AUTO: 1.01 (ref 1–1.03)
UROBILINOGEN UR STRIP-ACNC: <2 MG/DL
WBC #/AREA URNS AUTO: ABNORMAL /HPF

## 2024-11-01 PROCEDURE — 87077 CULTURE AEROBIC IDENTIFY: CPT

## 2024-11-01 PROCEDURE — 87186 SC STD MICRODIL/AGAR DIL: CPT

## 2024-11-01 PROCEDURE — 87147 CULTURE TYPE IMMUNOLOGIC: CPT

## 2024-11-01 PROCEDURE — 96372 THER/PROPH/DIAG INJ SC/IM: CPT

## 2024-11-01 PROCEDURE — 87086 URINE CULTURE/COLONY COUNT: CPT

## 2024-11-01 PROCEDURE — 81001 URINALYSIS AUTO W/SCOPE: CPT

## 2024-11-01 RX ORDER — GENTAMICIN 40 MG/ML
160 INJECTION, SOLUTION INTRAMUSCULAR; INTRAVENOUS ONCE
Status: COMPLETED | OUTPATIENT
Start: 2024-11-01 | End: 2024-11-01

## 2024-11-01 RX ADMIN — GENTAMICIN 160 MG: 40 INJECTION, SOLUTION INTRAMUSCULAR; INTRAVENOUS at 14:07

## 2024-11-01 NOTE — TELEPHONE ENCOUNTER
Returned call to patient and scheduled her for a nurse visit at 1:30 pm this afternoon for IM Gentamicin injection.

## 2024-11-01 NOTE — TELEPHONE ENCOUNTER
Called and spoke with Mookie to discuss VIVEK Zamora's note and recommendations regarding patients urinary symptoms and antibiotic options. Patient wishes to proceed with IM Gentamicin injection in office as based on last urine culture Gentamicin will be effective for both strains of bacteria which were seen. Patient plans on providing a repeat urine sample at the lab today before receiving antibiotic. Will forward message to provider to place order for IM Gent, then return call to patient to schedule her for nurse visit this afternoon in the [place] office. Patient is aware of ER precautions.

## 2024-11-01 NOTE — TELEPHONE ENCOUNTER
"Patient of Jase Marques, last seen 9/25/2024 for a cystoscopy with stent placement, called stating she is experiencing pain in her right back, bladder pressure, frequency, urgency, incontinence, cloudy urine, foul smelling odor, and chills. Her symptoms started yesterday. She completed a 10 day course of Cefodoxime today and continues to have symptoms. Patient denies fever, blood in urine, or pain with urination. I reviewed ED precautions, encouraged water intake, and avoiding bladder irritants. I verified [facility] pharmacy. Please advise.    Reason for Disposition   All other urine symptoms    Answer Assessment - Initial Assessment Questions  1. SYMPTOM: \"What's the main symptom you're concerned about?\" (e.g., frequency, incontinence)      Pain in right back, chills, bladder pressure, frequency, urgency, incontinence, cloudy urine, foul smelling odor    2. ONSET: \"When did the symptoms start?\"      Yesterday    3. PAIN: \"Is there any pain?\" If Yes, ask: \"How bad is it?\" (Scale: 1-10; mild, moderate, severe)      5/10    4. CAUSE: \"What do you think is causing the symptoms?\"      UTI- completed abx today cefpodoxime 10 day course    5. OTHER SYMPTOMS: \"Do you have any other symptoms?\" (e.g., blood in urine, fever, flank pain, pain with urination)      Denies    Protocols used: Urinary Symptoms-Adult-[place]    "

## 2024-11-01 NOTE — TELEPHONE ENCOUNTER
Reviewed last urine culture results.  Multidrug-resistant strains of bacteria.  Unfortunately we are between a rock and a hard place because patient has severe allergy listed for both Cipro and Bactrim which should work for this bacteria but which patient cannot obviously take because of her reaction.  Macrobid seems to have less of a reaction based on documentation and is probably the best option for oral antibiotics given that the bacteria is resistant to penicillins, cephalosporins, tetracyclines.      I agree that we should get repeat urine culture. creatinine clearance measured at 65 mL/mi please review ER parameters.  If patient is febrile, having significant pain, with flank pain she is to present to ER and for likely admission for IV antibiotics.  If patient is stable for outpatient treatment then the options are either trialing a course of Macrobid with a notation that I cannot guarantee that she will not have hives given her previous reaction that there is a risk that she got a more serious reaction or she can come into the office for a single dose of IM gentamicin as this would treat both strains previously seen.  Patient's creatinine clearance measured at 65 mL/min so per up-to-date would not need to have dose adjustment

## 2024-11-01 NOTE — PROGRESS NOTES
"11/1/2024  Mookie Muhammad is a 69 y.o. female  [id number]    Diagnosis: Recurrent UTI, Nephrolithiasis   Chief Complaint    IM Gentamicin injection         Patient presents for IM Gentamicin injection managed by BRANDON Tejada.     Plan:  Follow up as scheduled for CYSTOSCOPY URETEROSCOPY WITH LITHOTRIPSY HOLMIUM LASER, RETROGRADE PYELOGRAM AND INSERTION STENT URETERAL (Bilateral: Abdomen) with Dr. Bautista on 11/21/24.     Patient is aware to contact the office with any concerns. ER precautions reviewed.       Medication administration:    Gentamicin 160 mg administered IM as ordered, 80 mg in each deltoid. Patient tolerated well with no immediate side effects.     Administrations This Visit       gentamicin (GARAMYCIN) 40 mg/mL injection 160 mg       Admin Date  11/01/2024 Action  Given Dose  160 mg Route  Intramuscular Documented By  Marizol Ulloa RN                    Vitals:    11/01/24 1333   BP: 136/84   Pulse: 90   Resp: 16   Temp: 98.3 °F (36.8 °C)   SpO2: 98%   Weight: 62.5 kg (137 lb 12.8 oz)   Height: 5' 5\" (1.651 m)         Marizol Ulloa RN   "

## 2024-11-03 LAB — BACTERIA UR CULT: ABNORMAL

## 2024-11-05 ENCOUNTER — OFFICE VISIT (OUTPATIENT)
Dept: LAB | Facility: HOSPITAL | Age: 69
End: 2024-11-05
Payer: COMMERCIAL

## 2024-11-05 ENCOUNTER — TELEPHONE (OUTPATIENT)
Dept: UROLOGY | Facility: AMBULATORY SURGERY CENTER | Age: 69
End: 2024-11-05

## 2024-11-05 DIAGNOSIS — N39.0 RECURRENT UTI: ICD-10-CM

## 2024-11-05 DIAGNOSIS — Z01.810 PRE-OPERATIVE CARDIOVASCULAR EXAMINATION: ICD-10-CM

## 2024-11-05 DIAGNOSIS — N39.0 RECURRENT UTI: Primary | ICD-10-CM

## 2024-11-05 PROCEDURE — 93005 ELECTROCARDIOGRAM TRACING: CPT

## 2024-11-05 NOTE — TELEPHONE ENCOUNTER
I called and spoke with patient I stated that we ordered her Augmentin and I want her to start 3 days before surgery.  We also sent her enough pills to take another few days of antibiotics after surgery as well. Patient understood everything.                   ----- Message from Jim Bautista MD sent at 11/5/2024  2:57 PM EST -----  Please let the patient know that I ordered her Augmentin and I want her to start 3 days before surgery.  I am when to give her enough pills to take another few days of antibiotics after surgery as well.

## 2024-11-06 ENCOUNTER — OFFICE VISIT (OUTPATIENT)
Dept: UROLOGY | Facility: CLINIC | Age: 69
End: 2024-11-06
Payer: COMMERCIAL

## 2024-11-06 ENCOUNTER — TELEPHONE (OUTPATIENT)
Dept: FAMILY MEDICINE CLINIC | Facility: CLINIC | Age: 69
End: 2024-11-06

## 2024-11-06 ENCOUNTER — PREP FOR PROCEDURE (OUTPATIENT)
Dept: UROLOGY | Facility: AMBULATORY SURGERY CENTER | Age: 69
End: 2024-11-06

## 2024-11-06 ENCOUNTER — TELEPHONE (OUTPATIENT)
Dept: ANESTHESIOLOGY | Facility: CLINIC | Age: 69
End: 2024-11-06

## 2024-11-06 VITALS
HEART RATE: 99 BPM | SYSTOLIC BLOOD PRESSURE: 126 MMHG | BODY MASS INDEX: 22.71 KG/M2 | HEIGHT: 65 IN | TEMPERATURE: 97.3 F | OXYGEN SATURATION: 98 % | DIASTOLIC BLOOD PRESSURE: 72 MMHG | WEIGHT: 136.3 LBS

## 2024-11-06 DIAGNOSIS — N20.0 NEPHROLITHIASIS: Primary | ICD-10-CM

## 2024-11-06 DIAGNOSIS — N13.2 HYDRONEPHROSIS WITH URINARY OBSTRUCTION DUE TO URETERAL CALCULUS: ICD-10-CM

## 2024-11-06 DIAGNOSIS — Z01.818 PRE-OPERATIVE CLEARANCE: ICD-10-CM

## 2024-11-06 PROCEDURE — 99213 OFFICE O/P EST LOW 20 MIN: CPT

## 2024-11-06 RX ORDER — OXYBUTYNIN CHLORIDE 5 MG/1
5 TABLET ORAL 2 TIMES DAILY PRN
Qty: 20 TABLET | Refills: 1 | Status: SHIPPED | OUTPATIENT
Start: 2024-11-06

## 2024-11-06 NOTE — H&P (VIEW-ONLY)
"11/6/2024    Chief Complaint   Patient presents with    Follow-up     H&P for CYSTOSCOPY URETEROSCOPY WITH LITHOTRIPSY HOLMIUM LASER, RETROGRADE PYELOGRAM AND INSERTION STENT URETERAL (Bilateral: Abdomen) on 11/21/2024 by Dr Bautista           Assessment and Plan    69 y.o. female     Bilateral ureterolithiasis  Status post bilateral ureteral stent placement for a 3 mm left proximal ureteral stone and a 15 mm right renal stone with mild hydronephrosis and concern for urinary tract infection with history of recurrent UTIs by Dr. Bautista on 9/25/2024.   History and physical was performed for the patient's upcoming cystoscopy, bilateral ureteroscopy with holmium laser lithotripsy, bilateral retrograde pyelogram, bilateral ureteral stent placement on 11/21/2024 with Dr. Bautista.  Technique, benefits, and risk of the procedure listed above were discussed with them today and informed written consent was obtained.  All questions and concerns regarding surgery and perioperative expectations have been addressed and answered.  No overall changes in their health since last visit.  Denies any prior complications with anesthesia.   Pre-op urine culture from 11/1/2024 shows 40,000-49,000 CFU/mL Enterococcus faecalis.  Dr. Bautista has ordered Augmentin to start 3 days prior to surgery.  She is complaining of constant urgency and bladder pressure ongoing for several weeks, I suspect this is likely from the ureteral stents. I recommend oxybutynin 5 mg PO as needed twice daily.   Following patient office visit, I was reviewing pre-op labs and EKG further. EKG performed yesterday shows \"normal sinus rhythm with ST and T wave abnormality, consider anterior ischemia\". I forwarded a message to Dr. Bautista to review EKG result and determine need for repeat EKG versus further clearance.       Interval HPI:    History of Present Illness  Mookie Muhammad is a 69 y.o. female here for history and physical exam for upcoming cystoscopy, bilateral " "ureteroscopy with holmium laser lithotripsy, bilateral retrograde pyelogram, and bilateral stent insertion with Dr. Bautista on 11/21/2024.     Patient is status post bilateral ureteral stent placement for a 3 mm left proximal ureteral stone and a 15 mm right renal stone with mild hydronephrosis and concern for urinary tract infection with history of recurrent UTIs by Dr. Bautista on 9/25/2024.  She is scheduled for definitive stone management on 11/21/2024.    Patient had originally presented to [facility] emergency department on 9/24/2024 for complaints of UTI symptoms.  CT in the ER showed a 3 mm proximal left ureteral calculi with moderate left hydronephrosis as well as a 15 mm right renal calculi with mild hydronephrosis.  Patient was ultimately transferred to [facility] for urologic intervention.        Review of Systems   Constitutional:  Negative for chills and fever.   HENT:  Negative for ear pain and sore throat.    Eyes:  Negative for pain and visual disturbance.   Respiratory:  Negative for cough and shortness of breath.    Cardiovascular:  Negative for chest pain and palpitations.   Gastrointestinal:  Negative for abdominal pain and vomiting.   Genitourinary:  Positive for frequency and urgency. Negative for dysuria and hematuria.   Musculoskeletal:  Negative for arthralgias and back pain.   Skin:  Negative for color change and rash.   Neurological:  Negative for seizures and syncope.   All other systems reviewed and are negative.              Vitals  Vitals:    11/06/24 0806   BP: 126/72   Pulse: 99   Temp: (!) 97.3 °F (36.3 °C)   SpO2: 98%   Weight: 61.8 kg (136 lb 4.8 oz)   Height: 5' 5\" (1.651 m)       Physical Exam  Vitals reviewed.   Constitutional:       General: She is not in acute distress.     Appearance: Normal appearance. She is normal weight. She is not ill-appearing or toxic-appearing.   HENT:      Head: Normocephalic and atraumatic.      Nose: Nose normal.      " Mouth/Throat:      Mouth: Mucous membranes are moist.      Pharynx: Oropharynx is clear.   Eyes:      General: No scleral icterus.     Conjunctiva/sclera: Conjunctivae normal.   Cardiovascular:      Rate and Rhythm: Normal rate and regular rhythm.      Pulses: Normal pulses.      Heart sounds: Normal heart sounds.   Pulmonary:      Effort: Pulmonary effort is normal. No respiratory distress.      Breath sounds: Normal breath sounds.   Abdominal:      General: Abdomen is flat.      Palpations: Abdomen is soft.      Tenderness: There is no abdominal tenderness. There is no right CVA tenderness or left CVA tenderness.      Hernia: No hernia is present.   Musculoskeletal:         General: Normal range of motion.      Cervical back: Normal range of motion.   Skin:     General: Skin is warm and dry.   Neurological:      General: No focal deficit present.      Mental Status: She is alert and oriented to person, place, and time. Mental status is at baseline.   Psychiatric:         Mood and Affect: Mood normal.         Behavior: Behavior normal.         Thought Content: Thought content normal.         Judgment: Judgment normal.         Past History  Past Medical History:   Diagnosis Date    Allergic     seasonal    Hypertension     Kidney stone     Ovarian mass     Pelvic pain      Social History     Socioeconomic History    Marital status: /Civil Union     Spouse name: None    Number of children: None    Years of education: None    Highest education level: None   Occupational History    None   Tobacco Use    Smoking status: Every Day     Current packs/day: 0.25     Average packs/day: 0.3 packs/day for 52.8 years (13.2 ttl pk-yrs)     Types: Cigarettes     Start date: 1972     Passive exposure: Current    Smokeless tobacco: Never   Vaping Use    Vaping status: Never Used   Substance and Sexual Activity    Alcohol use: Not Currently     Comment: rare    Drug use: Never    Sexual activity: Yes   Other Topics Concern     "None   Social History Narrative    None     Social Determinants of Health     Financial Resource Strain: Low Risk  (3/1/2024)    Overall Financial Resource Strain (CARDIA)     Difficulty of Paying Living Expenses: Not hard at all   Food Insecurity: Not on file   Transportation Needs: No Transportation Needs (3/1/2024)    PRAPARE - Transportation     Lack of Transportation (Medical): No     Lack of Transportation (Non-Medical): No   Physical Activity: Not on file   Stress: Not on file   Social Connections: Not on file   Intimate Partner Violence: Not on file   Housing Stability: Not on file     Social History     Tobacco Use   Smoking Status Every Day    Current packs/day: 0.25    Average packs/day: 0.3 packs/day for 52.8 years (13.2 ttl pk-yrs)    Types: Cigarettes    Start date: 1972    Passive exposure: Current   Smokeless Tobacco Never     Family History   Problem Relation Age of Onset    No Known Problems Mother     Hypertension Father        The following portions of the patient's history were reviewed and updated as appropriate allergies, current medications, past medical history, past social history, past surgical history and problem list    Imaging:    Results  No results found for this or any previous visit (from the past 1 hour(s)).]  No results found for: \"PSA\"  Lab Results   Component Value Date    CALCIUM 9.1 10/07/2024    K 4.0 10/07/2024    CO2 24 10/07/2024     10/07/2024    BUN 23 10/07/2024    CREATININE 0.79 10/07/2024     Lab Results   Component Value Date    WBC 7.14 10/07/2024    HGB 12.2 10/07/2024    HCT 37.6 10/07/2024    MCV 95 10/07/2024     (H) 10/07/2024       Please Note:  Voice dictation software has been used to create this document. There may be inadvertent transcriptions errors.     BRANDON Oglesby 11/06/24   "

## 2024-11-06 NOTE — TELEPHONE ENCOUNTER
PT stated that she received a missed call from our office. There is no documentation that anyone called pt. Pt aware .

## 2024-11-06 NOTE — Clinical Note
Patient was seen today for pre-op H&P for upcoming bilateral ureteroscopy with lithotripsy with you on 11/21. Pre-op EKG from yesterday shows ST and T wave abnormality, consider possible anterior infarct. Patient is asymptomatic. Do you want me to order a repeat EKG or wait for day of surgery?

## 2024-11-06 NOTE — PROGRESS NOTES
"11/6/2024    Chief Complaint   Patient presents with    Follow-up     H&P for CYSTOSCOPY URETEROSCOPY WITH LITHOTRIPSY HOLMIUM LASER, RETROGRADE PYELOGRAM AND INSERTION STENT URETERAL (Bilateral: Abdomen) on 11/21/2024 by Dr Bautista           Assessment and Plan    69 y.o. female     Bilateral ureterolithiasis  Status post bilateral ureteral stent placement for a 3 mm left proximal ureteral stone and a 15 mm right renal stone with mild hydronephrosis and concern for urinary tract infection with history of recurrent UTIs by Dr. Bautista on 9/25/2024.   History and physical was performed for the patient's upcoming cystoscopy, bilateral ureteroscopy with holmium laser lithotripsy, bilateral retrograde pyelogram, bilateral ureteral stent placement on 11/21/2024 with Dr. Bautista.  Technique, benefits, and risk of the procedure listed above were discussed with them today and informed written consent was obtained.  All questions and concerns regarding surgery and perioperative expectations have been addressed and answered.  No overall changes in their health since last visit.  Denies any prior complications with anesthesia.   Pre-op urine culture from 11/1/2024 shows 40,000-49,000 CFU/mL Enterococcus faecalis.  Dr. Bautista has ordered Augmentin to start 3 days prior to surgery.  She is complaining of constant urgency and bladder pressure ongoing for several weeks, I suspect this is likely from the ureteral stents. I recommend oxybutynin 5 mg PO as needed twice daily.   Following patient office visit, I was reviewing pre-op labs and EKG further. EKG performed yesterday shows \"normal sinus rhythm with ST and T wave abnormality, consider anterior ischemia\". I forwarded a message to Dr. Bautista to review EKG result and determine need for repeat EKG versus further clearance.       Interval HPI:    History of Present Illness  Mookie Muhammad is a 69 y.o. female here for history and physical exam for upcoming cystoscopy, bilateral " "ureteroscopy with holmium laser lithotripsy, bilateral retrograde pyelogram, and bilateral stent insertion with Dr. Bautista on 11/21/2024.     Patient is status post bilateral ureteral stent placement for a 3 mm left proximal ureteral stone and a 15 mm right renal stone with mild hydronephrosis and concern for urinary tract infection with history of recurrent UTIs by Dr. Bautista on 9/25/2024.  She is scheduled for definitive stone management on 11/21/2024.    Patient had originally presented to [facility] emergency department on 9/24/2024 for complaints of UTI symptoms.  CT in the ER showed a 3 mm proximal left ureteral calculi with moderate left hydronephrosis as well as a 15 mm right renal calculi with mild hydronephrosis.  Patient was ultimately transferred to [facility] for urologic intervention.        Review of Systems   Constitutional:  Negative for chills and fever.   HENT:  Negative for ear pain and sore throat.    Eyes:  Negative for pain and visual disturbance.   Respiratory:  Negative for cough and shortness of breath.    Cardiovascular:  Negative for chest pain and palpitations.   Gastrointestinal:  Negative for abdominal pain and vomiting.   Genitourinary:  Positive for frequency and urgency. Negative for dysuria and hematuria.   Musculoskeletal:  Negative for arthralgias and back pain.   Skin:  Negative for color change and rash.   Neurological:  Negative for seizures and syncope.   All other systems reviewed and are negative.              Vitals  Vitals:    11/06/24 0806   BP: 126/72   Pulse: 99   Temp: (!) 97.3 °F (36.3 °C)   SpO2: 98%   Weight: 61.8 kg (136 lb 4.8 oz)   Height: 5' 5\" (1.651 m)       Physical Exam  Vitals reviewed.   Constitutional:       General: She is not in acute distress.     Appearance: Normal appearance. She is normal weight. She is not ill-appearing or toxic-appearing.   HENT:      Head: Normocephalic and atraumatic.      Nose: Nose normal.      " Mouth/Throat:      Mouth: Mucous membranes are moist.      Pharynx: Oropharynx is clear.   Eyes:      General: No scleral icterus.     Conjunctiva/sclera: Conjunctivae normal.   Cardiovascular:      Rate and Rhythm: Normal rate and regular rhythm.      Pulses: Normal pulses.      Heart sounds: Normal heart sounds.   Pulmonary:      Effort: Pulmonary effort is normal. No respiratory distress.      Breath sounds: Normal breath sounds.   Abdominal:      General: Abdomen is flat.      Palpations: Abdomen is soft.      Tenderness: There is no abdominal tenderness. There is no right CVA tenderness or left CVA tenderness.      Hernia: No hernia is present.   Musculoskeletal:         General: Normal range of motion.      Cervical back: Normal range of motion.   Skin:     General: Skin is warm and dry.   Neurological:      General: No focal deficit present.      Mental Status: She is alert and oriented to person, place, and time. Mental status is at baseline.   Psychiatric:         Mood and Affect: Mood normal.         Behavior: Behavior normal.         Thought Content: Thought content normal.         Judgment: Judgment normal.         Past History  Past Medical History:   Diagnosis Date    Allergic     seasonal    Hypertension     Kidney stone     Ovarian mass     Pelvic pain      Social History     Socioeconomic History    Marital status: /Civil Union     Spouse name: None    Number of children: None    Years of education: None    Highest education level: None   Occupational History    None   Tobacco Use    Smoking status: Every Day     Current packs/day: 0.25     Average packs/day: 0.3 packs/day for 52.8 years (13.2 ttl pk-yrs)     Types: Cigarettes     Start date: 1972     Passive exposure: Current    Smokeless tobacco: Never   Vaping Use    Vaping status: Never Used   Substance and Sexual Activity    Alcohol use: Not Currently     Comment: rare    Drug use: Never    Sexual activity: Yes   Other Topics Concern     "None   Social History Narrative    None     Social Determinants of Health     Financial Resource Strain: Low Risk  (3/1/2024)    Overall Financial Resource Strain (CARDIA)     Difficulty of Paying Living Expenses: Not hard at all   Food Insecurity: Not on file   Transportation Needs: No Transportation Needs (3/1/2024)    PRAPARE - Transportation     Lack of Transportation (Medical): No     Lack of Transportation (Non-Medical): No   Physical Activity: Not on file   Stress: Not on file   Social Connections: Not on file   Intimate Partner Violence: Not on file   Housing Stability: Not on file     Social History     Tobacco Use   Smoking Status Every Day    Current packs/day: 0.25    Average packs/day: 0.3 packs/day for 52.8 years (13.2 ttl pk-yrs)    Types: Cigarettes    Start date: 1972    Passive exposure: Current   Smokeless Tobacco Never     Family History   Problem Relation Age of Onset    No Known Problems Mother     Hypertension Father        The following portions of the patient's history were reviewed and updated as appropriate allergies, current medications, past medical history, past social history, past surgical history and problem list    Imaging:    Results  No results found for this or any previous visit (from the past 1 hour(s)).]  No results found for: \"PSA\"  Lab Results   Component Value Date    CALCIUM 9.1 10/07/2024    K 4.0 10/07/2024    CO2 24 10/07/2024     10/07/2024    BUN 23 10/07/2024    CREATININE 0.79 10/07/2024     Lab Results   Component Value Date    WBC 7.14 10/07/2024    HGB 12.2 10/07/2024    HCT 37.6 10/07/2024    MCV 95 10/07/2024     (H) 10/07/2024       Please Note:  Voice dictation software has been used to create this document. There may be inadvertent transcriptions errors.     BRANDON Oglesby 11/06/24   "

## 2024-11-07 ENCOUNTER — ANESTHESIA EVENT (OUTPATIENT)
Dept: ANESTHESIOLOGY | Facility: HOSPITAL | Age: 69
End: 2024-11-07

## 2024-11-07 ENCOUNTER — ANESTHESIA (OUTPATIENT)
Dept: ANESTHESIOLOGY | Facility: HOSPITAL | Age: 69
End: 2024-11-07

## 2024-11-07 ENCOUNTER — TELEMEDICINE (OUTPATIENT)
Dept: ANESTHESIOLOGY | Facility: CLINIC | Age: 69
End: 2024-11-07
Payer: COMMERCIAL

## 2024-11-07 VITALS — SYSTOLIC BLOOD PRESSURE: 120 MMHG | DIASTOLIC BLOOD PRESSURE: 82 MMHG

## 2024-11-07 DIAGNOSIS — N20.0 NEPHROLITHIASIS: ICD-10-CM

## 2024-11-07 DIAGNOSIS — F17.200 SMOKING: ICD-10-CM

## 2024-11-07 DIAGNOSIS — I10 ESSENTIAL HYPERTENSION, BENIGN: Primary | ICD-10-CM

## 2024-11-07 DIAGNOSIS — Z01.818 PRE-OPERATIVE CLEARANCE: ICD-10-CM

## 2024-11-07 LAB
ATRIAL RATE: 90 BPM
P AXIS: 62 DEGREES
PR INTERVAL: 172 MS
QRS AXIS: 3 DEGREES
QRSD INTERVAL: 68 MS
QT INTERVAL: 350 MS
QTC INTERVAL: 429 MS
T WAVE AXIS: 27 DEGREES
VENTRICULAR RATE: 90 BPM

## 2024-11-07 PROCEDURE — 93010 ELECTROCARDIOGRAM REPORT: CPT | Performed by: INTERNAL MEDICINE

## 2024-11-07 PROCEDURE — 99212 OFFICE O/P EST SF 10 MIN: CPT | Performed by: NURSE PRACTITIONER

## 2024-11-07 NOTE — ASSESSMENT & PLAN NOTE
11/7/2024 10:03 AM    /82   Stable   No concerns   METS 9     ekg   Narrative & Impression  Normal sinus rhythm  ST & T wave abnormality, consider anterior ischemia  Abnormal ECG

## 2024-11-07 NOTE — ASSESSMENT & PLAN NOTE
offered tobacco therapy- patient declined   States she smokes only 5 CIGS   REFUSED TOBACCO THERAPY - states I am nervous- smoking helps

## 2024-11-07 NOTE — PROGRESS NOTES
[facility]  CONSULT: SURGERY OPTIMIZATION     Virtual Regular Visit    Name: Mookie Muhammad      : 1955      MRN: [id number]  Encounter Provider: BRANDON Millard  Encounter Date: 2024   Encounter department: [facility] SURGICAL OPTIMIZATION [place]    Verification of patient location:    Patient is located at Home in the following state in which I hold an active license [place]    ASSESSMENT   69  year old female referred to SOC for pre-surgery optimization & BEST program   Other consult concerns include: PRE-OP CLEARANCE.  NEEDS HEART AND LUNGS ON ADMIT   She is scheduled on   Case: [id number] Date/Time: 24 1300   Procedure: CYSTOSCOPY URETEROSCOPY WITH LITHOTRIPSY HOLMIUM LASER, RETROGRADE PYELOGRAM AND INSERTION STENT URETERAL (Bilateral: Abdomen)   Anesthesia type: General   Diagnosis:      Left ureteral stone [N20.1]      Right renal stone [N20.0]   Pre-op diagnosis:      Left ureteral stone [N20.1]      Right renal stone [N20.0]   Location: [facility] OR ROOM 01 / [facility]   Surgeons: Jim Bautista MD     LAST ANESTHESIA   NO CONCERNS IN THE PAST   Assessment & Plan  Nephrolithiasis    Orders:    Ambulatory referral to surgical optimization    Pre-operative clearance  MAY PROCEED TO SURGERY   This was a video visit today- will need to listen to heart and lungs on admit   SEEN FOR SO   VERY ACTIVE- PER PATIENT    METS 9   DENIES CP/DENIES SOB   PATS REVIEWED- STABLE   EKG REVIEWED - STABLE   PMH REVIEWED- STABLE   SOC DOC REVIEWED STABLE FOR AASC  At risk for post-op BURT - NO   At risk for post-op SSI -NO   REFUSED TOBACCO THERAPY   Orders:    Ambulatory referral to surgical optimization    Essential hypertension, benign    2024 10:03 AM    /82   Stable   No concerns   METS 9     ekg   Narrative & Impression  Normal sinus rhythm  ST & T wave abnormality, consider anterior ischemia  Abnormal ECG     Smoking  offered tobacco  therapy- patient declined   States she smokes only 5 CIGS   REFUSED TOBACCO THERAPY - states I am nervous- smoking helps         Encounter provider BRANDON Millard    The patient was identified by name and date of birth. Mookie Muhammad was informed that this is a telemedicine visit and that the visit is being conducted through the Epic Embedded platform. She agrees to proceed..  My office door was closed. No one else was in the room.  She acknowledged consent and understanding of privacy and security of the video platform. The patient has agreed to participate and understands they can discontinue the visit at any time.    Patient is aware this is a billable service.     History of Present Illness     Mookie Muhammad is a 69 y.o. female who presents to [facility] for pre-surgery optimization.       I met patient over video though [employer].  Picture was clear.  Audio was good.  She was offered a live visit in the SOC and declined.  Prefers the video visit.  Admits to being in well health today.  Denies any new developments in her health.  Feels well.  Looking forward to surgery and getting this all behind her.    Lives at home with her .  She is independent with all ADLs.  Admits to being active.  She does all the housework and yard work.  Denies chest pain.  Denies shortness of breath.  METS is 9.    Blood work reviewed is stable.  Surgical optimization complete.  BURT risk low.  SSI risk low.  SOC anemia-no needs.    Proceed to surgery.  SOC DOC reviewed she is okay for ASSC      Review of Systems   Constitutional:  Negative for chills and fever.   HENT:  Negative for sneezing.    Respiratory:  Negative for cough, choking, chest tightness and shortness of breath.    Cardiovascular:  Negative for chest pain, palpitations and leg swelling.   Gastrointestinal:  Negative for diarrhea, nausea, rectal pain and vomiting.           Objective     LMP  (LMP Unknown)   Physical Exam  HENT:      Head: Normocephalic.       Nose: Nose normal.   Neurological:      Mental Status: She is alert.         Visit Time  Total Visit Duration: 20 minutes

## 2024-11-12 NOTE — PRE-PROCEDURE INSTRUCTIONS
Pre-Surgery Instructions:   Medication Instructions    alfuzosin (UROXATRAL) 10 mg 24 hr tablet Hold day of surgery.    Ascorbic Acid (VITAMIN C PO) Stop taking 7 days prior to surgery.    Bacillus Coagulans-Inulin (Probiotic) 1-250 BILLION-MG CAPS Stop taking 7 days prior to surgery.    oxybutynin (DITROPAN) 5 mg tablet Hold day of surgery.    valsartan (DIOVAN) 80 mg tablet Hold day of surgery.    VITAMIN D PO Stop taking 7 days prior to surgery.    Medication instructions for day surgery reviewed. Please use only a sip of water to take your instructed medications. Avoid all over the counter vitamins, supplements and NSAIDS for one week prior to surgery per anesthesia guidelines. Tylenol is ok to take as needed.     You will receive a call one business day prior to surgery with an arrival time and hospital directions. If your surgery is scheduled on a Monday, the hospital will be calling you on the Friday prior to your surgery. If you have not heard from anyone by 8pm, please call the hospital supervisor through the hospital  at [phone]. ([place] [phone] or [place] [phone]).    Do not eat or drink anything after midnight the night before your surgery, including candy, mints, lifesavers, or chewing gum. Do not drink alcohol 24hrs before your surgery. Try not to smoke at least 24hrs before your surgery.       Follow the pre surgery showering instructions as listed in the “My Surgical Experience Booklet” or otherwise provided by your surgeon's office. Do not use a blade to shave the surgical area 1 week before surgery. It is okay to use a clean electric clippers up to 24 hours before surgery. Do not apply any lotions, creams, including makeup, cologne, deodorant, or perfumes after showering on the day of your surgery. Do not use dry shampoo, hair spray, hair gel, or any type of hair products.     No contact lenses, eye make-up, or artificial eyelashes. Remove nail polish, including gel  polish, and any artificial, gel, or acrylic nails if possible. Remove all jewelry including rings and body piercing jewelry.     Wear causal clothing that is easy to take on and off. Consider your type of surgery.    Keep any valuables, jewelry, piercings at home. Please bring any specially ordered equipment (sling, braces) if indicated.    Arrange for a responsible person to drive you to and from the hospital on the day of your surgery. Please confirm the visitor policy for the day of your procedure when you receive your phone call with an arrival time.     Call the surgeon's office with any new illnesses, exposures, or additional questions prior to surgery.    Please reference your “My Surgical Experience Booklet” for additional information to prepare for your upcoming surgery.

## 2024-11-20 ENCOUNTER — ANESTHESIA EVENT (OUTPATIENT)
Dept: PERIOP | Facility: AMBULARY SURGERY CENTER | Age: 69
End: 2024-11-20
Payer: COMMERCIAL

## 2024-11-20 PROBLEM — N30.00 ACUTE CYSTITIS WITHOUT HEMATURIA: Status: RESOLVED | Noted: 2022-04-25 | Resolved: 2024-11-20

## 2024-11-21 ENCOUNTER — ANESTHESIA (OUTPATIENT)
Dept: PERIOP | Facility: AMBULARY SURGERY CENTER | Age: 69
End: 2024-11-21
Payer: COMMERCIAL

## 2024-11-21 ENCOUNTER — TELEPHONE (OUTPATIENT)
Dept: UROLOGY | Facility: CLINIC | Age: 69
End: 2024-11-21

## 2024-11-21 ENCOUNTER — HOSPITAL ENCOUNTER (OUTPATIENT)
Facility: AMBULARY SURGERY CENTER | Age: 69
Setting detail: OUTPATIENT SURGERY
Discharge: HOME | End: 2024-11-21
Attending: UROLOGY | Admitting: UROLOGY
Payer: COMMERCIAL

## 2024-11-21 ENCOUNTER — APPOINTMENT (OUTPATIENT)
Dept: RADIOLOGY | Facility: AMBULARY SURGERY CENTER | Age: 69
End: 2024-11-21
Payer: COMMERCIAL

## 2024-11-21 VITALS
BODY MASS INDEX: 21.66 KG/M2 | TEMPERATURE: 98.6 F | RESPIRATION RATE: 18 BRPM | WEIGHT: 130 LBS | SYSTOLIC BLOOD PRESSURE: 164 MMHG | OXYGEN SATURATION: 98 % | DIASTOLIC BLOOD PRESSURE: 82 MMHG | HEART RATE: 85 BPM | HEIGHT: 65 IN

## 2024-11-21 DIAGNOSIS — N20.0 RIGHT RENAL STONE: Primary | ICD-10-CM

## 2024-11-21 DIAGNOSIS — N20.1 LEFT URETERAL STONE: ICD-10-CM

## 2024-11-21 PROCEDURE — 52356 CYSTO/URETERO W/LITHOTRIPSY: CPT | Performed by: UROLOGY

## 2024-11-21 PROCEDURE — C1769 GUIDE WIRE: HCPCS | Performed by: UROLOGY

## 2024-11-21 PROCEDURE — 82360 CALCULUS ASSAY QUANT: CPT | Performed by: UROLOGY

## 2024-11-21 PROCEDURE — C1894 INTRO/SHEATH, NON-LASER: HCPCS | Performed by: UROLOGY

## 2024-11-21 PROCEDURE — 87086 URINE CULTURE/COLONY COUNT: CPT | Performed by: UROLOGY

## 2024-11-21 PROCEDURE — 52332 CYSTOSCOPY AND TREATMENT: CPT | Performed by: UROLOGY

## 2024-11-21 PROCEDURE — C2625 STENT, NON-COR, TEM W/DEL SY: HCPCS | Performed by: UROLOGY

## 2024-11-21 PROCEDURE — C1758 CATHETER, URETERAL: HCPCS | Performed by: UROLOGY

## 2024-11-21 PROCEDURE — C1747 URETEROSCOPE DIGITAL FLEX SNGL USE RVS DEFLECTION APTRA: HCPCS | Performed by: UROLOGY

## 2024-11-21 PROCEDURE — 74420 UROGRAPHY RTRGR +-KUB: CPT

## 2024-11-21 RX ORDER — FENTANYL CITRATE/PF 50 MCG/ML
25 SYRINGE (ML) INJECTION
Refills: 0 | Status: DISCONTINUED | OUTPATIENT
Start: 2024-11-21 | End: 2024-11-21 | Stop reason: HOSPADM

## 2024-11-21 RX ORDER — OXYCODONE HYDROCHLORIDE 5 MG/1
5 TABLET ORAL EVERY 4 HOURS PRN
Refills: 0 | Status: DISCONTINUED | OUTPATIENT
Start: 2024-11-21 | End: 2024-11-21 | Stop reason: HOSPADM

## 2024-11-21 RX ORDER — PHENAZOPYRIDINE HYDROCHLORIDE 200 MG/1
200 TABLET, FILM COATED ORAL
Qty: 6 TABLET | Refills: 0 | Status: SHIPPED | OUTPATIENT
Start: 2024-11-21 | End: 2024-11-23

## 2024-11-21 RX ORDER — MIDAZOLAM HYDROCHLORIDE 2 MG/2ML
INJECTION, SOLUTION INTRAMUSCULAR; INTRAVENOUS AS NEEDED
Status: DISCONTINUED | OUTPATIENT
Start: 2024-11-21 | End: 2024-11-21

## 2024-11-21 RX ORDER — SODIUM CHLORIDE, SODIUM LACTATE, POTASSIUM CHLORIDE, CALCIUM CHLORIDE 600; 310; 30; 20 MG/100ML; MG/100ML; MG/100ML; MG/100ML
INJECTION, SOLUTION INTRAVENOUS CONTINUOUS PRN
Status: DISCONTINUED | OUTPATIENT
Start: 2024-11-21 | End: 2024-11-21

## 2024-11-21 RX ORDER — PROPOFOL 10 MG/ML
INJECTION, EMULSION INTRAVENOUS CONTINUOUS PRN
Status: DISCONTINUED | OUTPATIENT
Start: 2024-11-21 | End: 2024-11-21

## 2024-11-21 RX ORDER — LIDOCAINE HYDROCHLORIDE 20 MG/ML
INJECTION, SOLUTION EPIDURAL; INFILTRATION; INTRACAUDAL; PERINEURAL AS NEEDED
Status: DISCONTINUED | OUTPATIENT
Start: 2024-11-21 | End: 2024-11-21

## 2024-11-21 RX ORDER — PROPOFOL 10 MG/ML
INJECTION, EMULSION INTRAVENOUS AS NEEDED
Status: DISCONTINUED | OUTPATIENT
Start: 2024-11-21 | End: 2024-11-21

## 2024-11-21 RX ORDER — CEFDINIR 300 MG/1
300 CAPSULE ORAL EVERY 12 HOURS SCHEDULED
Qty: 12 CAPSULE | Refills: 0 | Status: SHIPPED | OUTPATIENT
Start: 2024-11-21 | End: 2024-11-27

## 2024-11-21 RX ORDER — ONDANSETRON 2 MG/ML
INJECTION INTRAMUSCULAR; INTRAVENOUS AS NEEDED
Status: DISCONTINUED | OUTPATIENT
Start: 2024-11-21 | End: 2024-11-21

## 2024-11-21 RX ORDER — MAGNESIUM HYDROXIDE 1200 MG/15ML
LIQUID ORAL AS NEEDED
Status: DISCONTINUED | OUTPATIENT
Start: 2024-11-21 | End: 2024-11-21 | Stop reason: HOSPADM

## 2024-11-21 RX ORDER — CEFTRIAXONE 1 G/1
1000 INJECTION, POWDER, FOR SOLUTION INTRAMUSCULAR; INTRAVENOUS ONCE
Status: SHIPPED | OUTPATIENT
Start: 2024-11-21

## 2024-11-21 RX ORDER — ONDANSETRON 2 MG/ML
4 INJECTION INTRAMUSCULAR; INTRAVENOUS ONCE AS NEEDED
Status: DISCONTINUED | OUTPATIENT
Start: 2024-11-21 | End: 2024-11-21 | Stop reason: HOSPADM

## 2024-11-21 RX ORDER — OXYBUTYNIN CHLORIDE 5 MG/1
5 TABLET ORAL 3 TIMES DAILY PRN
Qty: 30 TABLET | Refills: 0 | Status: SHIPPED | OUTPATIENT
Start: 2024-11-21 | End: 2024-12-01

## 2024-11-21 RX ORDER — DEXAMETHASONE SODIUM PHOSPHATE 10 MG/ML
INJECTION, SOLUTION INTRAMUSCULAR; INTRAVENOUS AS NEEDED
Status: DISCONTINUED | OUTPATIENT
Start: 2024-11-21 | End: 2024-11-21

## 2024-11-21 RX ORDER — HYDROMORPHONE HCL/PF 1 MG/ML
0.25 SYRINGE (ML) INJECTION
Refills: 0 | Status: DISCONTINUED | OUTPATIENT
Start: 2024-11-21 | End: 2024-11-21 | Stop reason: HOSPADM

## 2024-11-21 RX ORDER — SODIUM CHLORIDE, SODIUM LACTATE, POTASSIUM CHLORIDE, CALCIUM CHLORIDE 600; 310; 30; 20 MG/100ML; MG/100ML; MG/100ML; MG/100ML
50 INJECTION, SOLUTION INTRAVENOUS CONTINUOUS
Status: DISCONTINUED | OUTPATIENT
Start: 2024-11-21 | End: 2024-11-21 | Stop reason: HOSPADM

## 2024-11-21 RX ORDER — FENTANYL CITRATE 50 UG/ML
INJECTION, SOLUTION INTRAMUSCULAR; INTRAVENOUS AS NEEDED
Status: DISCONTINUED | OUTPATIENT
Start: 2024-11-21 | End: 2024-11-21

## 2024-11-21 RX ADMIN — ONDANSETRON 4 MG: 2 INJECTION, SOLUTION INTRAMUSCULAR; INTRAVENOUS at 13:23

## 2024-11-21 RX ADMIN — LIDOCAINE HYDROCHLORIDE 100 MG: 20 INJECTION, SOLUTION EPIDURAL; INFILTRATION; INTRACAUDAL at 13:23

## 2024-11-21 RX ADMIN — PROPOFOL 200 MG: 10 INJECTION, EMULSION INTRAVENOUS at 13:23

## 2024-11-21 RX ADMIN — SODIUM CHLORIDE, SODIUM LACTATE, POTASSIUM CHLORIDE, AND CALCIUM CHLORIDE: .6; .31; .03; .02 INJECTION, SOLUTION INTRAVENOUS at 13:15

## 2024-11-21 RX ADMIN — FENTANYL CITRATE 25 MCG: 50 INJECTION INTRAMUSCULAR; INTRAVENOUS at 13:30

## 2024-11-21 RX ADMIN — OXYCODONE HYDROCHLORIDE 5 MG: 5 TABLET ORAL at 15:30

## 2024-11-21 RX ADMIN — PROPOFOL 100 MCG/KG/MIN: 10 INJECTION, EMULSION INTRAVENOUS at 13:23

## 2024-11-21 RX ADMIN — FENTANYL CITRATE 25 MCG: 50 INJECTION INTRAMUSCULAR; INTRAVENOUS at 13:58

## 2024-11-21 RX ADMIN — FENTANYL CITRATE 25 MCG: 50 INJECTION INTRAMUSCULAR; INTRAVENOUS at 13:24

## 2024-11-21 RX ADMIN — CEFTRIAXONE 1000 MG: 2 INJECTION, POWDER, FOR SOLUTION INTRAMUSCULAR; INTRAVENOUS at 13:15

## 2024-11-21 RX ADMIN — SODIUM CHLORIDE, SODIUM LACTATE, POTASSIUM CHLORIDE, AND CALCIUM CHLORIDE: .6; .31; .03; .02 INJECTION, SOLUTION INTRAVENOUS at 14:17

## 2024-11-21 RX ADMIN — DEXAMETHASONE SODIUM PHOSPHATE 10 MG: 10 INJECTION INTRAMUSCULAR; INTRAVENOUS at 13:23

## 2024-11-21 RX ADMIN — FENTANYL CITRATE 25 MCG: 50 INJECTION INTRAMUSCULAR; INTRAVENOUS at 13:37

## 2024-11-21 NOTE — TELEPHONE ENCOUNTER
Patient underwent bilateral ureteroscopy with laser lithotripsy of a large right renal stone today.  No significant stone found on the left side.  She has bilateral stents without strings placed.  Given her history of recurrent infections she was sent home on antibiotics although there were no infectious concerns from an anesthesia standpoint during her surgery.  We did send a urine for culture from the bladder at the beginning of surgery.    Plan for stents removal in 1 to 2 weeks.  Plan for KUB and ultrasound to follow-up 4 to 6 weeks later.

## 2024-11-21 NOTE — ANESTHESIA PREPROCEDURE EVALUATION
Procedure:  CYSTOSCOPY URETEROSCOPY WITH LITHOTRIPSY HOLMIUM LASER, RETROGRADE PYELOGRAM AND INSERTION STENT URETERAL (Bilateral: Abdomen)    Relevant Problems   CARDIO   (+) Essential hypertension, benign   (+) Liver hemangioma   (+) Pure hypercholesterolemia   (+) Renal artery aneurysm (HCC)   (+) Splenic artery aneurysm (HCC)      GI/HEPATIC   (+) Liver hemangioma      /RENAL   (+) Hydronephrosis with urinary obstruction due to ureteral calculus      MUSCULOSKELETAL   (+) Primary osteoarthritis of right hip      PULMONARY   (+) Smoking      Other   (+) Elevated serum creatinine        Physical Exam    Airway    Mallampati score: III  TM Distance: >3 FB  Neck ROM: full     Dental        Cardiovascular  Cardiovascular exam normal    Pulmonary  Pulmonary exam normal     Other Findings  post-pubertal.      Anesthesia Plan  ASA Score- 3     Anesthesia Type- general with ASA Monitors.         Additional Monitors:     Airway Plan: LMA.    Comment: Pt claustrophobic to FM and requests versed preop.       Plan Factors-Exercise tolerance (METS): >4 METS.    Chart reviewed. EKG reviewed.  Existing labs reviewed. Patient summary reviewed.                  Induction- intravenous.    Postoperative Plan- Plan for postoperative opioid use.     Perioperative Resuscitation Plan - Level 1 - Full Code.       Informed Consent- Anesthetic plan and risks discussed with patient and spouse.  I personally reviewed this patient with the CRNA. Discussed and agreed on the Anesthesia Plan with the CRNA..        Lab Results   Component Value Date    HGBA1C 5.9 (H) 10/07/2024       Lab Results   Component Value Date    K 4.0 10/07/2024     10/07/2024    CO2 24 10/07/2024    BUN 23 10/07/2024    CREATININE 0.79 10/07/2024    GLUF 94 10/07/2024    CALCIUM 9.1 10/07/2024    AST 19 10/07/2024    ALT 11 10/07/2024    ALKPHOS 41 10/07/2024    EGFR 77 10/07/2024       Lab Results   Component Value Date    WBC 7.14 10/07/2024    HGB 12.2  10/07/2024    HCT 37.6 10/07/2024    MCV 95 10/07/2024     (H) 10/07/2024     Normal sinus rhythm  ST & T wave abnormality, consider anterior ischemia  Abnormal ECG  When compared with ECG of 23-Oct-2023 08:01,  QRS duration has decreased  Nonspecific T wave abnormality now evident in Lateral leads  Confirmed by George Aguilar ([id number]) on 11/7/2024 10:13:05 AM     Specimen Collected: 11/05/24 08:02

## 2024-11-21 NOTE — OP NOTE
OPERATIVE REPORT  PATIENT NAME: Mookie Muhammad    :  1955  MRN: [id number]  Pt Location: [facility] OR ROOM 04    SURGERY DATE: 2024    Surgeons and Role:     * Jim Bautista MD - Primary    Preop Diagnosis:  Left ureteral stone [N20.1]  Right renal stone [N20.0]    Postop diagnosis  1.5 cm right renal stone  No left renal stone found    Procedure(s):  Bilateral - CYSTOSCOPY URETEROSCOPY WITH LITHOTRIPSY HOLMIUM LASER. STONE BASKETING. RETROGRADE PYELOGRAM AND INSERTION STENT URETERAL    Specimen(s):  ID Type Source Tests Collected by Time Destination   A : Urine culture Urine Urine, Other URINE CULTURE Jim Bautista MD 2024 1331    B : Right kidney stones Calculus Kidney, Right STONE ANALYSIS Jim Bautista MD 2024 1352        Estimated Blood Loss:   Minimal    Drains:  Ureteral Internal Stent Right ureter 6 Fr. (Active)   Number of days: 0       Ureteral Internal Stent Left ureter 6 Fr. (Active)   Number of days: 0       [REMOVED] Ureteral Internal Stent Left ureter 6 Fr. (Removed)   Number of days: 57       [REMOVED] Ureteral Internal Stent Right ureter 6 Fr. (Removed)   Number of days: 57       Anesthesia Type:   General    Operative Indications:  Patient with history of recurrent urine tract infections was taken to the operating room on 2024 which presented with a 3 mm left proximal ureteral stone and a 15 mm right renal pelvis stone with mild hydronephrosis and concern for urine tract infection resulting in bilateral stent placement.  She now presents for definitive stone treatment.  Her preoperative urine culture was positive as have been almost all of her cultures and she was put on antibiotics leading up to surgery.    Operative Findings:  Mildly cloudy urine in the bladder was sent for culture.  An Aptra at 1 time use digital flexible uteroscope was used given the patient's large right stone burden and need for bilateral uteroscopy.  1.5 cm right hard renal stone  was fragmented and basket extracted.  No significant left ureteral or renal stones found.  Bilateral stents without strings place    Complications:   None    Procedure and Technique:  After informed consent including the risks of bleeding, infection, ureteral injury, and need for secondary procedures, patient was placed supine in the operating room theater.  Gen. anesthesia was administered.      They were then placed in the dorsal lithotomy position and sterilely prepped and draped in usual fashion. Antibiotic prophylaxis and DVT prophylaxis were administered Cystoscopy was performed the 21 Divehi cystoscope 30° lens.  This revealed a normal urethra.  Inspection of the bladder revealed no abnormalities other than mildly cloudy urine which was sent for culture.  Stents were seen emanating from each ureteral orifice. Fluoroscopy showed stent in good position on the right side with a likely large renal stone in the expected of the renal pelvis.    Attention was turned to the right ureteral orifice. A 5fr open ended catheter was attempted to be passed into the ureteral orifice and a wire was passed up alongside into the renal pelvis.  The stent was grasped and pulled to the urethra.  Attempt was made to pass a wire through the stent but it was encrusted internally and therefore the stent was removed.  The scope was reintroduced and a second wire was passed up the ureter with the aid of a tiger tail after a retrograde ureteropyelogram was performed showing normal-appearing ureter without a filling defect.      As 11/13 access sheath was not available I first attempt to use a 12/14 sheath passing up the 12 Fr inner component only but this met resistance at distal ureter so was abandoned and instead a 10/12 access sheath was was passed up sequentially over one of the two wires under fluoroscopic guidance without any significant resistance felt with passage of the inner or inner and outer sheath combined so that the tommy  in the proximal ureter.  A 8fr digital flexible ureteroscope was then passed through the access sheath into the proximal ureter. An Aptra at 1 time use digital flexible uteroscope was used given the patient's large right stone burden and need for bilateral uteroscopy.  The stone was encountered in the renal pelvis.  It was about 1.5 cm x 1 cm in diameter and was hard and brown in nature.      This was fragmented with the use of a 270 micron holmium laser fiber at combination of settings of 0.6J and 45Hz and 1.5J and 25Hz.  Sequential passes were then made with a zero tip wire basket in order to retrieve stone debris.   Given the large size of the stone we spent over 20 minutes extracting fragments.  The collecting system was examined again and no significant residual stone fragments were appreciated.  Hemostasis was appropriate.       The ureteroscope was backed down the ureter under vision and there were no residual fragments and the ureter was noted to be intact.  A retrograde pyelogram was formed from the mid and distal ureter showing no signs of extravasation. A 6 x 24 double-J stent inserted without difficulty. The string was not left on.      Attention was now turned towards patient's left side.  In similar fashion a tiger tail was passed into the ureter and wire passed up.  The stent was grasped and removed.  A semirigid ureteroscope was passed into the ureter alongside the wire.  The ureteroscope was advanced up to the proximal muscle ureter.  No ureteral stone was found.  Was carefully backed out and again no stone was found.  A second wire was passed up through the cystoscope into the renal pelvis.  A 10/12 access sheath was sequentially passed over 1-2 wires with minimal resistance.  The after scope was passed through the access sheath.  The proximal ureter was entered and then the kidney and collecting system.  No significant stone was found despite a thorough search a few different times throughout the  collecting system.  There were a few 1 mm soft stones like fragments which were too small to grasp and removed.  The ureteroscope was carefully backed down the ureter.  There is no signs of ureteral injury or stone.  A retrograde was formed from the mid and distal ureter showing no signs of extravasation.  Once the scope was removed a 6 x 24 double-J stent was inserted over the wire without difficulty.  The string was not left on.    The bladder was drained.   The patient was placed back supine, awakened from general anesthesia and brought to recovery room in stable condition.     A qualified resident physician was not available.    Patient Disposition:  PACU       PLAN:  The patient will have her stents removed in 1 about week.  They will then follow up with a KUB and ultrasound approximately 6 weeks later.          SIGNATURE: Jim Bautista MD  DATE: November 21, 2024  TIME: 2:48 PM

## 2024-11-21 NOTE — DISCHARGE INSTR - AVS FIRST PAGE
Mookie Muhammad:    Your surgery went very well.  Your very large right renal stone was fragmented to small pieces and subsequent fragments extracted via basket such that a residual stone should be small like grains of sand that can pass on their own.  We did not find any significant stone in the left kidney or kidney 2.    We will remove your stents in clinic via cystoscopy in about 1 to 2 weeks.  This is a quick outpatient procedure for which you are awake and has mild discomfort.    Please plan to take an antibiotic for the next 3 days and then again around the stent removal (starting the day before, the day of and finishing the day after stent removal).    It is important to ensure you have healed well from surgery and therefore we will plan for an abdominal X-ray and kidney ultrasound approximately 4-6 weeks after the stent is removed.    Please take your medications as prescribed with caution for comfort.  Most importantly please drink 6-8 glasses of water per day    Please call with any questions or concerns.    Jim Bautista MD  [facility]  [phone]            WHAT IS A STENT?  At the end of the procedure, your doctor may place a stent into your ureter. A stent is a thin, flexible piece of plastic that will hold open your ureter while the remaining small pieces of stone pass. This allows your kidney to drain easily and prevents you from having to “pass” these small stone pieces on your own, which could be painful. The stent is about 12 inches long and looks and feels like a thin piece of spaghetti.    AFTER THE PROCEDURE  After the procedure you may experience the following symptoms. All of these are normal and should resolve within 1 or 2 days after your stent is removed.  Urinary frequency (urinating more often than usual)  Urinary urgency (the sensation that you need to urinate right away)  Painful urination (this can be pain in your bladder or in your back when  you  urinate)  Blood in your urine ( a stent can irritate the lining of your bladder causing it to bleed)  Back/Flank pain, especially with urination    You may receive a prescription for narcotic pain medication after the procedure. You will also receive a prescription for tamsulosin which you will take once a day for 2 weeks to help relax your ureter and decrease stent discomfort. You will also need to purchase a stool softener (i.e. Colace) or mild laxative (i.e. Miralax) as the narcotic pain medication can make you constipated. This is important as constipation can exacerbate stent related symptoms.     STENT REMOVAL  In some cases, your doctor will leave strings attached to your stent. The strings will be taped to your skin after the procedure. The strings will allow you to remove the thin flexible stent while you are at home. Normally, the stent can be removed 3-5 days after your procedure; your physician will tell you the specific date after your procedure.     On the day you are supposed to remove your stent, do the following:  When you wake up in the morning, take 1-2 pain pills with food.  Start your antibiotic pill the morning of schedule stent removal if prescribed  One hour later sit on the toilet or in the bath tub.  Take a deep breath in and while exhaling, pull the string.   Dispose of the stent in the garbage.    Alternatively, you will come back for an office procedure to remove the stent by placing a small camera into your bladder to remove the stent.    During the next 4-8 hours after removing your stent, you may experience additional blood in your urine, pain with urination or back/side pain. You should take the pain medication you were prescribed to help you with the pain, as well as continue the Flomax. If the pain is severe, you are vomiting, and/or have a fever > 101.4 please call the clinic.

## 2024-11-21 NOTE — INTERVAL H&P NOTE
H&P reviewed. After examining the patient I find no changes in the patients condition since the H&P had been written.    Vitals:    11/21/24 1220   BP: 146/87   Pulse: 79   Resp: 18   Temp: (!) 97.4 °F (36.3 °C)   SpO2: 100%       The patient has been taking Augmentin for the last 3 days based on preoperative culture data.  We discussed surgery today.  Plan for bilateral ureteroscopy with laser lithotripsy to treat her bilateral stone burden which is much more significant on the right side.  My main concern for surgery is her risk for infection despite antibiotics leading up to surgery given her recurrent multi drug-resistant bacteria.  We discussed this risk.

## 2024-11-21 NOTE — ANESTHESIA POSTPROCEDURE EVALUATION
Post-Op Assessment Note    CV Status:  Stable  Pain Score: 0    Pain management: adequate       Mental Status:  Arousable and sleepy   Hydration Status:  Stable   PONV Controlled:  Controlled   Airway Patency:  Patent     Post Op Vitals Reviewed: Yes    No anethesia notable event occurred.    Staff: CRNA           Last Filed PACU Vitals:  Vitals Value Taken Time   Temp 98.2 °F (36.8 °C) 11/21/24 1500   Pulse 83 11/21/24 1501   /79 11/21/24 1500   Resp 29 11/21/24 1501   SpO2 94 % 11/21/24 1501   Vitals shown include unfiled device data.    Modified Kvng:  Activity: 2 (11/21/2024  3:00 PM)  Respiration: 2 (11/21/2024  3:00 PM)  Circulation: 2 (11/21/2024  3:00 PM)  Consciousness: 2 (11/21/2024  3:00 PM)  Oxygen Saturation: 2 (11/21/2024  3:00 PM)  Modified Kvng Score: 10 (11/21/2024  3:00 PM)

## 2024-11-22 LAB — BACTERIA UR CULT: NORMAL

## 2024-11-22 NOTE — TELEPHONE ENCOUNTER
Post Op Note    Mookie Muhammad is a 69 y.o. female s/p CYSTOSCOPY URETEROSCOPY WITH LITHOTRIPSY HOLMIUM LASER, STONE BASKETING, RETROGRADE PYELOGRAM AND INSERTION STENT URETERAL (Bilateral: Abdomen)  performed 11/21/2024.  Mookie Muhammad is a patient of Dr. Dr. Bautista and is seen at the [place] office.     How would you rate your pain on a scale from 1 to 10, 10 being the worst pain ever? PRN  Have you had a fever? No  Have your bowel movements been regular? Yes  Do you have any difficulty urinating? No  Do you have any other questions or concerns that I can address at this time?     -Post op expectations discussed  -SS of infection discussed. Advised to call with any.  -Encouraged water intake of 8-10 glasses a day  -Medications reviewed  -Appts confirmed and advised to contact CS to schedule US/KUB in 6 weeks.  Advised to contact office with any questions or concerns. Pt verbalized understanding and was thankful for call.

## 2024-12-03 LAB
CA CARBONATE CRY STONE QL IR: 10 %
COLOR STONE: NORMAL
COM MFR STONE: 90 %
COMMENT-STONE3: NORMAL
COMPOSITION: NORMAL
LABORATORY COMMENT REPORT: NORMAL
PHOTO: NORMAL
SIZE STONE: NORMAL MM
SPEC SOURCE SUBJ: NORMAL
STONE ANALYSIS-IMP: NORMAL
WT STONE: 141 MG

## 2024-12-04 ENCOUNTER — PROCEDURE VISIT (OUTPATIENT)
Dept: UROLOGY | Facility: CLINIC | Age: 69
End: 2024-12-04
Payer: COMMERCIAL

## 2024-12-04 VITALS
DIASTOLIC BLOOD PRESSURE: 82 MMHG | SYSTOLIC BLOOD PRESSURE: 136 MMHG | OXYGEN SATURATION: 100 % | WEIGHT: 131.4 LBS | TEMPERATURE: 97.1 F | HEIGHT: 65 IN | BODY MASS INDEX: 21.89 KG/M2 | HEART RATE: 93 BPM

## 2024-12-04 DIAGNOSIS — Z96.0 RETAINED URETERAL STENT: ICD-10-CM

## 2024-12-04 DIAGNOSIS — N20.0 NEPHROLITHIASIS: Primary | ICD-10-CM

## 2024-12-04 LAB
SL AMB  POCT GLUCOSE, UA: NORMAL
SL AMB LEUKOCYTE ESTERASE,UA: NORMAL
SL AMB POCT BILIRUBIN,UA: NORMAL
SL AMB POCT BLOOD,UA: NORMAL
SL AMB POCT CLARITY,UA: CLEAR
SL AMB POCT COLOR,UA: YELLOW
SL AMB POCT KETONES,UA: NORMAL
SL AMB POCT NITRITE,UA: NORMAL
SL AMB POCT PH,UA: 6
SL AMB POCT SPECIFIC GRAVITY,UA: 1.01
SL AMB POCT URINE PROTEIN: NORMAL
SL AMB POCT UROBILINOGEN: NORMAL

## 2024-12-04 PROCEDURE — 81002 URINALYSIS NONAUTO W/O SCOPE: CPT

## 2024-12-04 PROCEDURE — 52310 CYSTOSCOPY AND TREATMENT: CPT

## 2024-12-04 NOTE — PROGRESS NOTES
12/4/2024  Mookie Muhammad is a 69 y.o. female  [id number]        Diagnosis      Patient is s/p bilateral ureteroscopy with stone extraction on 11/21/2024 with Dr. Bautista    Plan  Cystoscopy bilateral ureteral stent removal performed today without complication.  Patient did have some small stone fragments within the base of the bladder.  Both stents were removed intact.  She will continue with Augmentin twice daily and have extended this for additional 2 days for total treatment course of 5 days.  Plan for ultrasound and x-ray in 6 weeks and she has follow-up with me in January.       Cystoscopy     Date/Time  12/4/2024 11:40 AM     Performed by  BRANDON Oglesby   Authorized by  BRANDON Oglesby     Universal Protocol:  Procedure performed by: (PAMELA Lowe)  Consent: Verbal consent obtained. Written consent obtained.  Risks and benefits: risks, benefits and alternatives were discussed  Consent given by: patient  Timeout called at: 12/4/2024 11:57 AM.  Patient understanding: patient states understanding of the procedure being performed  Patient consent: the patient's understanding of the procedure matches consent given  Procedure consent: procedure consent matches procedure scheduled  Patient identity confirmed: verbally with patient      Procedure Details:  Procedure type: bilateral ureteral stent    Patient tolerance: Patient tolerated the procedure well with no immediate complications    Additional Procedure Details: The patient returns to the office today to undergo cystoscopy with bilateral stent removal. Risk and benefits of the procedure were discussed and informed consent was obtained.  The patient was placed in the modified supine position. The genitalia were prepped and draped in a sterile fashion. Viscous lidocaine was used for local anesthesia. The flexible cystoscope was passed. The bladder was inspected. The stents were identified coming from the bilateral ureteral orifice. The right ureteral stent  was grasped with a flexible grasper and was then removed in its entirety without complications. The flexible cystoscope was again passed into the bladder and the left ureteral stent was grasped with a flexible grasper and was removed in its entirety without complication. Overall the patient tolerated the procedure.    The patient was provided with a 2 day prescription of Augmentin for infection prophylaxis following the procedure.    They were made aware to advise our office of any fevers greater than 101 degrees Fahrenheit, malaise, or chills.  They were advised that it is normal to have cramping pain on the ipsilateral side for a day or so after ureteral stent removal.  They are to remain well hydrated in the coming days.        Procedure Cystoscopy ureteral stent removal    There were no vitals filed for this visit.    Stent removed intact without difficulty. Reviewed post stent removal symptoms including flank pain, dysuria, and hematuria. Instructed patient to increase oral fluid intake.  Encouraged the use of NSAIDS and other prescribed pain medication as needed for discomfort. Patient instructed to call the office or report to the ER for uncontrolled pain, fever, chills, nausea or vomiting.       BRANDON Oglesby

## 2025-01-08 ENCOUNTER — HOSPITAL ENCOUNTER (OUTPATIENT)
Dept: ULTRASOUND IMAGING | Facility: HOSPITAL | Age: 70
Discharge: HOME | End: 2025-01-08
Attending: UROLOGY
Payer: COMMERCIAL

## 2025-01-08 DIAGNOSIS — N39.0 RECURRENT UTI: ICD-10-CM

## 2025-01-08 DIAGNOSIS — N20.0 NEPHROLITHIASIS: ICD-10-CM

## 2025-01-08 PROCEDURE — 76775 US EXAM ABDO BACK WALL LIM: CPT

## 2025-01-16 ENCOUNTER — OFFICE VISIT (OUTPATIENT)
Dept: UROLOGY | Facility: CLINIC | Age: 70
End: 2025-01-16
Payer: COMMERCIAL

## 2025-01-16 VITALS
SYSTOLIC BLOOD PRESSURE: 130 MMHG | OXYGEN SATURATION: 96 % | BODY MASS INDEX: 22.16 KG/M2 | WEIGHT: 133 LBS | DIASTOLIC BLOOD PRESSURE: 88 MMHG | TEMPERATURE: 98 F | HEIGHT: 65 IN | HEART RATE: 74 BPM

## 2025-01-16 DIAGNOSIS — N20.0 NEPHROLITHIASIS: Primary | ICD-10-CM

## 2025-01-16 PROCEDURE — 99213 OFFICE O/P EST LOW 20 MIN: CPT

## 2025-01-16 NOTE — PROGRESS NOTES
Name: Mookie Muhammad      : 1955      MRN: [id number]  Encounter Provider: BRANDON Oglesby  Encounter Date: 2025   Encounter department: [facility]OLOGY [place]    :  Assessment & Plan  Nephrolithiasis  Patient is status post cystoscopy, bilateral ureteroscopy with holmium laser lithotripsy, bilateral retrograde, and bilateral stent placement by Dr. Bautista on 2024 for treatment of a large 15 mm right renal calculi and a 3 mm proximal left ureteral calculi.  Follow-up ultrasound demonstrates a 13 mm calculi within the right renal pelvis as well as a 5 mm nonobstructing calculi in the right lower pole.  In addition there is a 2 mm nonobstructing stone on the left.  I personally reviewed her intraoperative findings from her recent surgery with Dr. Bautista in November. The right renal calculi at the time measured approximately 1.5 x 1 cm and was hard and brown in nature. Dr. Bautista was able to fragment the stone but given the large size of the stone he did spend over 20 minutes extracting fragments. Collecting system was examined again and there were no significant residual stone fragments appreciated.  It is unclear to me if the right renal calculi noted on ultrasound imaging is residual fragments from her recent surgery versus new stone formation versus possible migration of her right lower pole stones.  She is currently asymptomatic but is frustrated regarding the ultrasound findings.  I recommending a CT stone study for better evaluation of her residual stone burden.  I will call her with these results.    Orders:    Ambulatory Referral to Nephrology; Future    CT renal stone study abdomen pelvis wo contrast; Future          Interval HPI:    Patient presents today to review her renal ultrasound following recent bilateral uteroscopy with lithotripsy.  She is discouraged to see that she still has a 1.3 cm stone within the right renal pelvis noted on ultrasound.  She is having no  symptoms but she is frustrated because she thought that we had treated the stone.  She is concerned that she may have produced another stone in such a short period.       History of Present Illness   Objective   LMP  (LMP Unknown)     Patient presents today for postop follow-up status post cystoscopy, bilateral ureteroscopy with holmium laser lithotripsy, bilateral retrograde, and bilateral stent placement by Dr. Bautista on 11/21/2024.  Renal ultrasound from 1/8/2025 shows mild fullness of the right renal collecting system unchanged, there is a 5 mm nonobstructing calculi seen in the lower pole and a 13 mm calculi demonstrated in the right renal pelvis.  No perinephric fluid collection.  Left kidney shows no hydronephrosis, there is a 2 mm nonobstructing renal calculi.  Bilateral ureteral jets are detected.    Patient had originally presented to [facility] emergency department on 9/24/2024 for complaints of UTI symptoms. CT in the ER showed a 3 mm proximal left ureteral calculi with moderate left hydronephrosis as well as a 15 mm right renal calculi with mild hydronephrosis. Patient was ultimately transferred to [facility] for urologic intervention and had bilateral ureteral stents placed with need for second staged ureteroscopy at later date.              Review of Systems   Constitutional:  Negative for chills and fever.   HENT:  Negative for ear pain and sore throat.    Eyes:  Negative for pain and visual disturbance.   Respiratory:  Negative for cough and shortness of breath.    Cardiovascular:  Negative for chest pain and palpitations.   Gastrointestinal:  Negative for abdominal pain and vomiting.   Genitourinary:  Negative for dysuria and hematuria.   Musculoskeletal:  Negative for arthralgias and back pain.   Skin:  Negative for color change and rash.   Neurological:  Negative for seizures and syncope.   All other systems reviewed and are negative.      Physical Exam  Vitals and nursing  note reviewed.   Constitutional:       General: She is not in acute distress.     Appearance: She is well-developed.   HENT:      Head: Normocephalic and atraumatic.   Eyes:      Conjunctiva/sclera: Conjunctivae normal.   Cardiovascular:      Rate and Rhythm: Normal rate and regular rhythm.      Heart sounds: No murmur heard.  Pulmonary:      Effort: Pulmonary effort is normal. No respiratory distress.      Breath sounds: Normal breath sounds.   Abdominal:      Palpations: Abdomen is soft.      Tenderness: There is no abdominal tenderness.   Musculoskeletal:         General: No swelling.      Cervical back: Neck supple.   Skin:     General: Skin is warm and dry.      Capillary Refill: Capillary refill takes less than 2 seconds.   Neurological:      Mental Status: She is alert.   Psychiatric:         Mood and Affect: Mood normal.           Imagin2025    RENAL ULTRASOUND     INDICATION: N39.0: Urinary tract infection, site not specified  N20.0: Calculus of kidney.     COMPARISON: CT abdomen and pelvis 2024, renal ultrasound 2023     TECHNIQUE: Ultrasound of the retroperitoneum was performed with a curvilinear transducer utilizing volumetric sweeps and still imaging techniques.     FINDINGS:     KIDNEYS:  Symmetric and normal size.  Right kidney: 10.2 x 5.5 x 5.6 cm. Volume 164.2 mL  Left kidney: 10.0 x 5.1 x 4.7 cm. Volume 124.2 mL     Right kidney  Normal echogenicity and contour.  No mass is identified.  Cyst in the upper pole of the right kidney measures 2.7 cm.  Mild fullness of the right renal collecting system is unchanged.  5 mm nonobstructing calculus is seen in the lower pole.  13 mm calculus is demonstrated in the right renal pelvis.  Calcification at the level of the right renal hilum corresponds to right renal artery aneurysm better seen on CT.  No perinephric fluid collections.     Left kidney  Normal echogenicity and contour.  No mass is identified.  No  hydronephrosis.  2 mm nonobstructing renal calculus.  No perinephric fluid collections.     URETERS:  Nonvisualized.     BLADDER:  Normally distended.  No focal thickening or mass lesions.  Bilateral ureteral jets detected.     Echogenic lesion is seen in the right lobe of the liver previously demonstrated on CT to represent hemangioma.     IMPRESSION:     Bilateral nonobstructing renal calculi.  Similar mild dilatation of the right renal collecting system.            9/24/2024    CT ABDOMEN AND PELVIS WITH IV CONTRAST     INDICATION: abd pain. .     COMPARISON: CT abdomen pelvis March 9, 2023     TECHNIQUE: CT examination of the abdomen and pelvis was performed. Multiplanar 2D reformatted images were created from the source data.     This examination, like all CT scans performed in the [facility], was performed utilizing techniques to minimize radiation dose exposure, including the use of iterative reconstruction and automated exposure control. Radiation dose length   product (DLP) for this visit: 609 mGy-cm     IV Contrast: 100 mL of iohexol (OMNIPAQUE)  Enteric Contrast: Not administered.     FINDINGS:     ABDOMEN     LOWER CHEST: No clinically significant abnormality in the visualized lower chest.     LIVER/BILIARY TREE: No surface nodularity. Unchanged 1.6 cm hepatic segment 7 hemangioma. Unchanged 1.9 cm hepatic segment 5 hemangioma. Patent hepatic and portal veins. No biliary ductal dilation.     GALLBLADDER: No calcified gallstones. No pericholecystic inflammatory change.     SPLEEN: Few subcentimeter hypoattenuating lesions may be hemangiomas.     PANCREAS: Unremarkable.     ADRENAL GLANDS: Unremarkable.     KIDNEYS/URETERS: Delayed enhancement of the left kidney. Left perinephric fat stranding. 3 mm proximal left ureteral calculus. Moderate left hydronephrosis.     15 mm calculus in the right renal pelvis; enhancement of the adjacent urothelium persist though has decreased compared to  prior study. Mild right hydronephrosis. Few calculi in the lower pole of right kidney measuring up to 7 mm.     Bilateral renal cysts.     STOMACH AND BOWEL: Small hiatal hernia. No bowel obstruction.     APPENDIX: No findings to suggest appendicitis.     ABDOMINOPELVIC CAVITY: No ascites. No pneumoperitoneum. No lymphadenopathy.     VESSELS: Atherosclerosis without aortic aneurysm. 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     PELVIS     REPRODUCTIVE ORGANS: Post hysterectomy and bilateral salpingo-oophorectomy.     URINARY BLADDER: Punctate focus of air in the urinary bladder. No calculi.     ABDOMINAL WALL/INGUINAL REGIONS: Unremarkable.     BONES: No acute fracture or suspicious osseous lesion. Spinal degenerative changes.     IMPRESSION:     1.  3 mm proximal left ureteral calculus eliciting moderate left hydronephrosis and acute left renal and perirenal inflammation  2.  15 mm calculus at the right renal pelvis with mild hydronephrosis. Mild enhancement of the adjacent urothelium can be indicative of chronic inflammation.              Please Note:  Voice dictation software has been used to create this document. There may be inadvertent transcriptions errors.     BRANDON Oglesby 01/16/25

## 2025-01-21 ENCOUNTER — OFFICE VISIT (OUTPATIENT)
Dept: FAMILY MEDICINE CLINIC | Facility: CLINIC | Age: 70
End: 2025-01-21
Payer: COMMERCIAL

## 2025-01-21 VITALS
DIASTOLIC BLOOD PRESSURE: 82 MMHG | WEIGHT: 134.5 LBS | SYSTOLIC BLOOD PRESSURE: 139 MMHG | HEART RATE: 104 BPM | TEMPERATURE: 96.3 F | OXYGEN SATURATION: 100 % | HEIGHT: 65 IN | BODY MASS INDEX: 22.41 KG/M2

## 2025-01-21 DIAGNOSIS — N20.0 KIDNEY STONES: Primary | ICD-10-CM

## 2025-01-21 DIAGNOSIS — N30.00 ACUTE CYSTITIS WITHOUT HEMATURIA: ICD-10-CM

## 2025-01-21 DIAGNOSIS — I10 ESSENTIAL HYPERTENSION, BENIGN: ICD-10-CM

## 2025-01-21 DIAGNOSIS — E78.00 PURE HYPERCHOLESTEROLEMIA: ICD-10-CM

## 2025-01-21 DIAGNOSIS — R73.01 IMPAIRED FASTING GLUCOSE: ICD-10-CM

## 2025-01-21 DIAGNOSIS — I72.2 RENAL ARTERY ANEURYSM (HCC): ICD-10-CM

## 2025-01-21 LAB
BACTERIA UR QL AUTO: ABNORMAL /HPF
BILIRUB UR QL STRIP: NEGATIVE
CLARITY UR: CLEAR
COLOR UR: ABNORMAL
GLUCOSE UR STRIP-MCNC: NEGATIVE MG/DL
HGB UR QL STRIP.AUTO: ABNORMAL
KETONES UR STRIP-MCNC: NEGATIVE MG/DL
LEUKOCYTE ESTERASE UR QL STRIP: ABNORMAL
NITRITE UR QL STRIP: NEGATIVE
NON-SQ EPI CELLS URNS QL MICRO: ABNORMAL /HPF
PH UR STRIP.AUTO: 5.5 [PH]
PROT UR STRIP-MCNC: NEGATIVE MG/DL
RBC #/AREA URNS AUTO: ABNORMAL /HPF
SP GR UR STRIP.AUTO: 1.01 (ref 1–1.03)
UROBILINOGEN UR STRIP-ACNC: <2 MG/DL
WBC #/AREA URNS AUTO: ABNORMAL /HPF

## 2025-01-21 PROCEDURE — G2211 COMPLEX E/M VISIT ADD ON: HCPCS | Performed by: FAMILY MEDICINE

## 2025-01-21 PROCEDURE — 87086 URINE CULTURE/COLONY COUNT: CPT | Performed by: FAMILY MEDICINE

## 2025-01-21 PROCEDURE — 87186 SC STD MICRODIL/AGAR DIL: CPT | Performed by: FAMILY MEDICINE

## 2025-01-21 PROCEDURE — 99214 OFFICE O/P EST MOD 30 MIN: CPT | Performed by: FAMILY MEDICINE

## 2025-01-21 PROCEDURE — 81001 URINALYSIS AUTO W/SCOPE: CPT | Performed by: FAMILY MEDICINE

## 2025-01-21 PROCEDURE — 87077 CULTURE AEROBIC IDENTIFY: CPT | Performed by: FAMILY MEDICINE

## 2025-01-21 RX ORDER — POTASSIUM CITRATE 10 MEQ/1
10 TABLET, EXTENDED RELEASE ORAL 2 TIMES DAILY WITH MEALS
Qty: 180 TABLET | Refills: 2 | Status: SHIPPED | OUTPATIENT
Start: 2025-01-21

## 2025-01-21 NOTE — ASSESSMENT & PLAN NOTE
Patient was quite frightened about a recurrent urinary tract infection.  Was upset that her urologist did not order another urine culture for her she is experiencing urinary frequency although she drinks a lot of water every day.  This is most likely the etiology.  However, to reassure the patient I ordered a urinalysis as well as a repeat urine culture.    Orders:    UA (URINE) with reflex to Scope; Future    Urine culture; Future

## 2025-01-21 NOTE — PROGRESS NOTES
Name: Mookie Muhammad      : 1955      MRN: [id number]  Encounter Provider: Jase García DO  Encounter Date: 2025   Encounter department: [facility]  :  Assessment & Plan  Essential hypertension, benign  Patient has hypertension.  Blood pressure today was borderline.  She was very worked up about the kidney stones.  I did not change her medication at this time.  I will have her follow a low-sodium diet and reassess her again in 3 months.  If blood pressure remains elevated then we will need to titrate her medication.         Kidney stones  Patient was very concerned about her recurrent kidney stones.  I discussed potassium citrate with her.  Patient was agreeable.  I started the patient on potassium citrate 10 mEq twice daily.  I told her that dose to be increased to 3 times per day if necessary.  Patient should continue routine follow-up with her neurologist.  She does have a follow-up CT scan ordered.    Orders:    potassium citrate (UROCIT-K) 10 mEq; Take 1 tablet (10 mEq total) by mouth 2 (two) times a day with meals    Pure hypercholesterolemia  I did order a CMP and a direct LDL cholesterol.  Her goal LDL cholesterol is less than 70.  Continue rosuvastatin 10 mg daily.    Orders:    LDL cholesterol, direct; Future    Impaired fasting glucose  Patient has impaired fasting glucose.  Hemoglobin A1c and fasting blood sugar have been ordered.    Orders:    Comprehensive metabolic panel; Future    Hemoglobin A1C; Future    Acute cystitis without hematuria  Patient was quite frightened about a recurrent urinary tract infection.  Was upset that her urologist did not order another urine culture for her she is experiencing urinary frequency although she drinks a lot of water every day.  This is most likely the etiology.  However, to reassure the patient I ordered a urinalysis as well as a repeat urine culture.    Orders:    UA (URINE) with reflex to Scope; Future    Urine culture;  "Future    Renal artery aneurysm (HCC)  There was an incidental finding of a 1.5 cm right renal artery aneurysm on the right.  This was seen on her CT scan in September.  This really should be checked annually.               Depression Screening and Follow-up Plan: Patient was screened for depression during today's encounter. They screened negative with a PHQ-2 score of 0.      History of Present Illness   This patient is a 69-year-old white female who presents to the office today for her routine checkup.  The patient tells me she is frustrated by problems she is having with kidney stones.  She has had issues with recurrent kidney stones since I last saw her.  She tells me she was started on a diet to reduce production of oxalates.  She tells me she really does not eat or drink anything on that list.  She does not know why she is getting them.  She is worried that it is going to leading her to develop another urinary tract infection.      Review of Systems   Respiratory:  Negative for cough, shortness of breath and wheezing.    Cardiovascular:  Negative for chest pain, palpitations and leg swelling.   Gastrointestinal:  Negative for abdominal distention, abdominal pain, blood in stool, constipation, diarrhea and nausea.   Genitourinary:  Positive for frequency. Negative for dysuria, flank pain and hematuria.       Objective   /82 (BP Location: Left arm)   Pulse 104   Temp (!) 96.3 °F (35.7 °C) (Tympanic)   Ht 5' 5\" (1.651 m)   Wt 61 kg (134 lb 8 oz)   LMP  (LMP Unknown)   SpO2 100%   BMI 22.38 kg/m²      Physical Exam  Vitals reviewed.   Constitutional:       Comments: This patient is a 69-year-old white female who appears her stated age.  Patient is nonseptic in appearance and in no apparent distress   HENT:      Head: Normocephalic and atraumatic.      Right Ear: Tympanic membrane, ear canal and external ear normal. There is no impacted cerumen.      Left Ear: Tympanic membrane, ear canal and external " ear normal. There is no impacted cerumen.      Mouth/Throat:      Mouth: Mucous membranes are moist.      Pharynx: Oropharynx is clear. No oropharyngeal exudate or posterior oropharyngeal erythema.   Eyes:      General: No scleral icterus.        Right eye: No discharge.         Left eye: No discharge.      Conjunctiva/sclera: Conjunctivae normal.      Pupils: Pupils are equal, round, and reactive to light.   Neck:      Comments: No thyromegaly  Cardiovascular:      Rate and Rhythm: Normal rate and regular rhythm.      Heart sounds: Normal heart sounds. No murmur heard.     No friction rub. No gallop.   Pulmonary:      Effort: Pulmonary effort is normal. No respiratory distress.      Breath sounds: Normal breath sounds. No stridor. No wheezing, rhonchi or rales.   Abdominal:      General: Bowel sounds are normal. There is no distension.      Palpations: Abdomen is soft. There is no mass.      Tenderness: There is no abdominal tenderness. There is no guarding.      Comments: No hepatosplenomegaly   Musculoskeletal:      Cervical back: Neck supple.   Lymphadenopathy:      Cervical: No cervical adenopathy.   Psychiatric:         Mood and Affect: Mood normal.         Behavior: Behavior normal.         Thought Content: Thought content normal.         Judgment: Judgment normal.     Extremities: Without cyanosis, clubbing, or edema

## 2025-01-21 NOTE — ASSESSMENT & PLAN NOTE
Patient has hypertension.  Blood pressure today was borderline.  She was very worked up about the kidney stones.  I did not change her medication at this time.  I will have her follow a low-sodium diet and reassess her again in 3 months.  If blood pressure remains elevated then we will need to titrate her medication.

## 2025-01-21 NOTE — ASSESSMENT & PLAN NOTE
Patient has impaired fasting glucose.  Hemoglobin A1c and fasting blood sugar have been ordered.    Orders:    Comprehensive metabolic panel; Future    Hemoglobin A1C; Future

## 2025-01-21 NOTE — ASSESSMENT & PLAN NOTE
I did order a CMP and a direct LDL cholesterol.  Her goal LDL cholesterol is less than 70.  Continue rosuvastatin 10 mg daily.    Orders:    LDL cholesterol, direct; Future

## 2025-01-21 NOTE — ASSESSMENT & PLAN NOTE
Patient was very concerned about her recurrent kidney stones.  I discussed potassium citrate with her.  Patient was agreeable.  I started the patient on potassium citrate 10 mEq twice daily.  I told her that dose to be increased to 3 times per day if necessary.  Patient should continue routine follow-up with her neurologist.  She does have a follow-up CT scan ordered.    Orders:    potassium citrate (UROCIT-K) 10 mEq; Take 1 tablet (10 mEq total) by mouth 2 (two) times a day with meals

## 2025-01-23 ENCOUNTER — RESULTS FOLLOW-UP (OUTPATIENT)
Dept: FAMILY MEDICINE CLINIC | Facility: CLINIC | Age: 70
End: 2025-01-23

## 2025-01-23 DIAGNOSIS — N30.00 ACUTE CYSTITIS WITHOUT HEMATURIA: Primary | ICD-10-CM

## 2025-01-23 LAB — BACTERIA UR CULT: ABNORMAL

## 2025-01-23 RX ORDER — CEPHALEXIN 500 MG/1
500 CAPSULE ORAL 3 TIMES DAILY
Qty: 21 CAPSULE | Refills: 0 | Status: SHIPPED | OUTPATIENT
Start: 2025-01-23 | End: 2025-01-30

## 2025-01-23 NOTE — TELEPHONE ENCOUNTER
----- Message from Jase García DO sent at 1/23/2025  8:57 AM EST -----  Urine culture is positive. She has another UTI. I sent a prescription for antibiotics to her pharmacy

## 2025-01-30 ENCOUNTER — HOSPITAL ENCOUNTER (OUTPATIENT)
Dept: CT IMAGING | Facility: HOSPITAL | Age: 70
Discharge: HOME | End: 2025-01-30
Payer: COMMERCIAL

## 2025-01-30 DIAGNOSIS — N20.0 NEPHROLITHIASIS: ICD-10-CM

## 2025-01-30 PROCEDURE — 74176 CT ABD & PELVIS W/O CONTRAST: CPT

## 2025-02-18 ENCOUNTER — RESULTS FOLLOW-UP (OUTPATIENT)
Dept: UROLOGY | Facility: CLINIC | Age: 70
End: 2025-02-18

## 2025-02-18 DIAGNOSIS — R39.9 UTI SYMPTOMS: ICD-10-CM

## 2025-02-18 DIAGNOSIS — N20.0 NEPHROLITHIASIS: Primary | ICD-10-CM

## 2025-02-18 NOTE — TELEPHONE ENCOUNTER
I spoke with patient via telephone.  I confirmed their name and date of birth prior to my telephone encounter.     I was able to speak with the patient over the phone today regarding her recent CT results. She still has persistent non-obstructing stones on the right with a 7 mm and 6 mm lower pole calculi.  These appear to be stable in size compared to her prior CT in September. There does not appear to be any new stones bilaterally.     She does express concern for possible UTI with reports of increased urinary frequency and foul smelling urine. She denies any flank pain or gross hematuria.     Plan:  After lengthy discussion with her regarding options to simply continue to observe her right sided stone burden versus scheduling repeat ureteroscopy to hopefully clear her of all her stones.  She has elected to simply observe for now.  We will plan for renal ultrasound in 6 months.  I have placed orders for urine culture to exclude infection due to reports of frequency and foul-smelling urine.  I will let her know those results.  I will have our office call her to schedule a 6-month follow-up with ultrasound prior.

## 2025-02-18 NOTE — TELEPHONE ENCOUNTER
----- Message from BRANDON Oglesby sent at 2/18/2025 12:50 PM EST -----  Please call patient to schedule a 6-month follow-up appointment with me.  She should have a renal ultrasound completed prior to that visit.

## 2025-02-20 PROBLEM — N30.00 ACUTE CYSTITIS WITHOUT HEMATURIA: Status: RESOLVED | Noted: 2022-04-25 | Resolved: 2025-02-20

## 2025-02-24 ENCOUNTER — APPOINTMENT (OUTPATIENT)
Dept: LAB | Facility: CLINIC | Age: 70
End: 2025-02-24
Payer: COMMERCIAL

## 2025-02-24 DIAGNOSIS — R39.9 UTI SYMPTOMS: ICD-10-CM

## 2025-02-24 PROCEDURE — 87186 SC STD MICRODIL/AGAR DIL: CPT

## 2025-02-24 PROCEDURE — 87077 CULTURE AEROBIC IDENTIFY: CPT

## 2025-02-24 PROCEDURE — 87086 URINE CULTURE/COLONY COUNT: CPT

## 2025-02-25 DIAGNOSIS — I10 ESSENTIAL HYPERTENSION, BENIGN: ICD-10-CM

## 2025-02-25 RX ORDER — VALSARTAN 80 MG/1
80 TABLET ORAL DAILY
Qty: 90 TABLET | Refills: 1 | Status: SHIPPED | OUTPATIENT
Start: 2025-02-25

## 2025-02-26 ENCOUNTER — RESULTS FOLLOW-UP (OUTPATIENT)
Dept: UROLOGY | Facility: CLINIC | Age: 70
End: 2025-02-26

## 2025-02-26 DIAGNOSIS — N39.0 URINARY TRACT INFECTION WITHOUT HEMATURIA, SITE UNSPECIFIED: Primary | ICD-10-CM

## 2025-02-26 LAB — BACTERIA UR CULT: ABNORMAL

## 2025-02-26 NOTE — TELEPHONE ENCOUNTER
----- Message from BRANDON Harris sent at 2/26/2025  8:30 AM EST -----  Please let patient know that her urine culture shows low colony count of E. coli.  I would not recommend treatment unless she is symptomatic.

## 2025-02-26 NOTE — RESULT ENCOUNTER NOTE
Please let patient know that her urine culture shows low colony count of E. coli.  I would not recommend treatment unless she is symptomatic.

## 2025-02-27 RX ORDER — CEFUROXIME AXETIL 500 MG/1
500 TABLET ORAL EVERY 12 HOURS SCHEDULED
Qty: 10 TABLET | Refills: 0 | Status: SHIPPED | OUTPATIENT
Start: 2025-02-27 | End: 2025-03-04

## 2025-02-27 NOTE — TELEPHONE ENCOUNTER
Pt calling in regards to message she is symptomatic with chills off & on her urine has strong odor she doesn't understand that she's not being treated,warm transfer to CTS

## 2025-02-27 NOTE — TELEPHONE ENCOUNTER
Patient is symptomatic, she has bladder pressure,chills, urine urgency and frequency     Requesting to be treated.    Please advise.    #[phone]       ----- Message from BRANDON Harris sent at 2/26/2025  8:30 AM EST -----  Please let patient know that her urine culture shows low colony count of E. coli.  I would not recommend treatment unless she is symptomatic.

## 2025-02-27 NOTE — TELEPHONE ENCOUNTER
Contacted patient and made her aware a script for Ceftin was sent to [facility] to take twice a day for the next 5 days.

## 2025-04-15 ENCOUNTER — VBI (OUTPATIENT)
Dept: ADMINISTRATIVE | Facility: OTHER | Age: 70
End: 2025-04-15

## 2025-04-15 NOTE — TELEPHONE ENCOUNTER
04/15/25 1:55 PM     Chart reviewed for Mammogram ; nothing is submitted to the patient's insurance at this time.     Funmi Rivers   [facility]

## 2025-04-17 ENCOUNTER — APPOINTMENT (OUTPATIENT)
Dept: LAB | Facility: CLINIC | Age: 70
End: 2025-04-17
Payer: COMMERCIAL

## 2025-04-17 DIAGNOSIS — E78.00 PURE HYPERCHOLESTEROLEMIA: ICD-10-CM

## 2025-04-17 DIAGNOSIS — R73.01 IMPAIRED FASTING GLUCOSE: ICD-10-CM

## 2025-04-17 LAB
ALBUMIN SERPL BCG-MCNC: 4.2 G/DL (ref 3.5–5)
ALP SERPL-CCNC: 52 U/L (ref 34–104)
ALT SERPL W P-5'-P-CCNC: 10 U/L (ref 7–52)
ANION GAP SERPL CALCULATED.3IONS-SCNC: 9 MMOL/L (ref 4–13)
AST SERPL W P-5'-P-CCNC: 14 U/L (ref 13–39)
BILIRUB SERPL-MCNC: 0.54 MG/DL (ref 0.2–1)
BUN SERPL-MCNC: 25 MG/DL (ref 5–25)
CALCIUM SERPL-MCNC: 9.3 MG/DL (ref 8.4–10.2)
CHLORIDE SERPL-SCNC: 106 MMOL/L (ref 96–108)
CO2 SERPL-SCNC: 26 MMOL/L (ref 21–32)
CREAT SERPL-MCNC: 0.96 MG/DL (ref 0.6–1.3)
EST. AVERAGE GLUCOSE BLD GHB EST-MCNC: 126 MG/DL
GFR SERPL CREATININE-BSD FRML MDRD: 60 ML/MIN/1.73SQ M
GLUCOSE P FAST SERPL-MCNC: 90 MG/DL (ref 65–99)
HBA1C MFR BLD: 6 %
LDLC SERPL DIRECT ASSAY-MCNC: 194 MG/DL (ref 0–100)
POTASSIUM SERPL-SCNC: 3.9 MMOL/L (ref 3.5–5.3)
PROT SERPL-MCNC: 6.9 G/DL (ref 6.4–8.4)
SODIUM SERPL-SCNC: 141 MMOL/L (ref 135–147)

## 2025-04-17 PROCEDURE — 83721 ASSAY OF BLOOD LIPOPROTEIN: CPT

## 2025-04-17 PROCEDURE — 80053 COMPREHEN METABOLIC PANEL: CPT

## 2025-04-17 PROCEDURE — 83036 HEMOGLOBIN GLYCOSYLATED A1C: CPT

## 2025-04-17 PROCEDURE — 36415 COLL VENOUS BLD VENIPUNCTURE: CPT

## 2025-04-21 ENCOUNTER — OFFICE VISIT (OUTPATIENT)
Dept: FAMILY MEDICINE CLINIC | Facility: CLINIC | Age: 70
End: 2025-04-21
Payer: COMMERCIAL

## 2025-04-21 VITALS
DIASTOLIC BLOOD PRESSURE: 74 MMHG | BODY MASS INDEX: 22.42 KG/M2 | TEMPERATURE: 96.9 F | SYSTOLIC BLOOD PRESSURE: 121 MMHG | HEIGHT: 65 IN | WEIGHT: 134.6 LBS | OXYGEN SATURATION: 100 % | HEART RATE: 89 BPM

## 2025-04-21 DIAGNOSIS — Z00.00 ENCOUNTER FOR MEDICARE ANNUAL WELLNESS EXAM: ICD-10-CM

## 2025-04-21 DIAGNOSIS — Z12.31 ENCOUNTER FOR SCREENING MAMMOGRAM FOR BREAST CANCER: ICD-10-CM

## 2025-04-21 DIAGNOSIS — E78.00 PURE HYPERCHOLESTEROLEMIA: ICD-10-CM

## 2025-04-21 DIAGNOSIS — I10 ESSENTIAL HYPERTENSION, BENIGN: Primary | ICD-10-CM

## 2025-04-21 DIAGNOSIS — R73.01 IMPAIRED FASTING GLUCOSE: ICD-10-CM

## 2025-04-21 DIAGNOSIS — Z79.899 ENCOUNTER FOR LONG-TERM (CURRENT) USE OF MEDICATIONS: ICD-10-CM

## 2025-04-21 PROCEDURE — 99214 OFFICE O/P EST MOD 30 MIN: CPT | Performed by: FAMILY MEDICINE

## 2025-04-21 PROCEDURE — G2211 COMPLEX E/M VISIT ADD ON: HCPCS | Performed by: FAMILY MEDICINE

## 2025-04-21 PROCEDURE — G0439 PPPS, SUBSEQ VISIT: HCPCS | Performed by: FAMILY MEDICINE

## 2025-04-21 RX ORDER — PRAVASTATIN SODIUM 20 MG
20 TABLET ORAL DAILY
Qty: 90 TABLET | Refills: 2 | Status: SHIPPED | OUTPATIENT
Start: 2025-04-21

## 2025-04-21 NOTE — PATIENT INSTRUCTIONS
Medicare Preventive Visit Patient Instructions  Thank you for completing your Welcome to Medicare Visit or Medicare Annual Wellness Visit today. Your next wellness visit will be due in one year (4/22/2026).  The screening/preventive services that you may require over the next 5-10 years are detailed below. Some tests may not apply to you based off risk factors and/or age. Screening tests ordered at today's visit but not completed yet may show as past due. Also, please note that scanned in results may not display below.  Preventive Screenings:  Service Recommendations Previous Testing/Comments   Colorectal Cancer Screening  * Colonoscopy    * Fecal Occult Blood Test (FOBT)/Fecal Immunochemical Test (FIT)  * Fecal DNA/Cologuard Test  * Flexible Sigmoidoscopy Age: 45-75 years old   Colonoscopy: every 10 years (may be performed more frequently if at higher risk)  OR  FOBT/FIT: every 1 year  OR  Cologuard: every 3 years  OR  Sigmoidoscopy: every 5 years  Screening may be recommended earlier than age 45 if at higher risk for colorectal cancer. Also, an individualized decision between you and your healthcare provider will decide whether screening between the ages of 76-85 would be appropriate. Colonoscopy: 04/09/2016  FOBT/FIT: Not on file  Cologuard: Not on file  Sigmoidoscopy: Not on file    Screening Current     Breast Cancer Screening Age: 40+ years old  Frequency: every 1-2 years  Not required if history of left and right mastectomy Mammogram: Not on file        Cervical Cancer Screening Between the ages of 21-29, pap smear recommended once every 3 years.   Between the ages of 30-65, can perform pap smear with HPV co-testing every 5 years.   Recommendations may differ for women with a history of total hysterectomy, cervical cancer, or abnormal pap smears in past. Pap Smear: 09/19/2023    Screening Not Indicated   Hepatitis C Screening Once for adults born between 1945 and 1965  More frequently in patients at high risk  for Hepatitis C Hep C Antibody: 09/22/2023    Screening Current   Diabetes Screening 1-2 times per year if you're at risk for diabetes or have pre-diabetes Fasting glucose: 90 mg/dL (4/17/2025)  A1C: 6.0 % (4/17/2025)  Screening Current   Cholesterol Screening Once every 5 years if you don't have a lipid disorder. May order more often based on risk factors. Lipid panel: 10/07/2024    Screening Not Indicated  History Lipid Disorder     Other Preventive Screenings Covered by Medicare:  Abdominal Aortic Aneurysm (AAA) Screening: covered once if your at risk. You're considered to be at risk if you have a family history of AAA.  Lung Cancer Screening: covers low dose CT scan once per year if you meet all of the following conditions: (1) Age 55-77; (2) No signs or symptoms of lung cancer; (3) Current smoker or have quit smoking within the last 15 years; (4) You have a tobacco smoking history of at least 20 pack years (packs per day multiplied by number of years you smoked); (5) You get a written order from a healthcare provider.  Glaucoma Screening: covered annually if you're considered high risk: (1) You have diabetes OR (2) Family history of glaucoma OR (3)  aged 50 and older OR (4)  American aged 65 and older  Osteoporosis Screening: covered every 2 years if you meet one of the following conditions: (1) You're estrogen deficient and at risk for osteoporosis based off medical history and other findings; (2) Have a vertebral abnormality; (3) On glucocorticoid therapy for more than 3 months; (4) Have primary hyperparathyroidism; (5) On osteoporosis medications and need to assess response to drug therapy.   Last bone density test (DXA Scan): Not on file.  HIV Screening: covered annually if you're between the age of 15-65. Also covered annually if you are younger than 15 and older than 65 with risk factors for HIV infection. For pregnant patients, it is covered up to 3 times per  pregnancy.    Immunizations:  Immunization Recommendations   Influenza Vaccine Annual influenza vaccination during flu season is recommended for all persons aged >= 6 months who do not have contraindications   Pneumococcal Vaccine   * Pneumococcal conjugate vaccine = PCV13 (Prevnar 13), PCV15 (Vaxneuvance), PCV20 (Prevnar 20)  * Pneumococcal polysaccharide vaccine = PPSV23 (Pneumovax) Adults 19-63 yo with certain risk factors or if 65+ yo  If never received any pneumonia vaccine: recommend Prevnar 20 (PCV20)  Give PCV20 if previously received 1 dose of PCV13 or PPSV23   Hepatitis B Vaccine 3 dose series if at intermediate or high risk (ex: diabetes, end stage renal disease, liver disease)   Respiratory syncytial virus (RSV) Vaccine - COVERED BY MEDICARE PART D  * RSVPreF3 (Arexvy) CDC recommends that adults 60 years of age and older may receive a single dose of RSV vaccine using shared clinical decision-making (SCDM)   Tetanus (Td) Vaccine - COST NOT COVERED BY MEDICARE PART B Following completion of primary series, a booster dose should be given every 10 years to maintain immunity against tetanus. Td may also be given as tetanus wound prophylaxis.   Tdap Vaccine - COST NOT COVERED BY MEDICARE PART B Recommended at least once for all adults. For pregnant patients, recommended with each pregnancy.   Shingles Vaccine (Shingrix) - COST NOT COVERED BY MEDICARE PART B  2 shot series recommended in those 19 years and older who have or will have weakened immune systems or those 50 years and older     Health Maintenance Due:      Topic Date Due   • Breast Cancer Screening: Mammogram  Never done   • Colorectal Cancer Screening  04/09/2026   • Hepatitis C Screening  Completed     Immunizations Due:      Topic Date Due   • Pneumococcal Vaccine: 65+ Years (1 of 2 - PCV) Never done   • COVID-19 Vaccine (2 - 2024-25 season) 09/01/2024     Advance Directives   What are advance directives?  Advance directives are legal documents  that state your wishes and plans for medical care. These plans are made ahead of time in case you lose your ability to make decisions for yourself. Advance directives can apply to any medical decision, such as the treatments you want, and if you want to donate organs.   What are the types of advance directives?  There are many types of advance directives, and each state has rules about how to use them. You may choose a combination of any of the following:  Living will:  This is a written record of the treatment you want. You can also choose which treatments you do not want, which to limit, and which to stop at a certain time. This includes surgery, medicine, IV fluid, and tube feedings.   Durable power of  for healthcare (DPAHC):  This is a written record that states who you want to make healthcare choices for you when you are unable to make them for yourself. This person, called a proxy, is usually a family member or a friend. You may choose more than 1 proxy.  Do not resuscitate (DNR) order:  A DNR order is used in case your heart stops beating or you stop breathing. It is a request not to have certain forms of treatment, such as CPR. A DNR order may be included in other types of advance directives.  Medical directive:  This covers the care that you want if you are in a coma, near death, or unable to make decisions for yourself. You can list the treatments you want for each condition. Treatment may include pain medicine, surgery, blood transfusions, dialysis, IV or tube feedings, and a ventilator (breathing machine).  Values history:  This document has questions about your views, beliefs, and how you feel and think about life. This information can help others choose the care that you would choose.  Why are advance directives important?  An advance directive helps you control your care. Although spoken wishes may be used, it is better to have your wishes written down. Spoken wishes can be misunderstood, or  not followed. Treatments may be given even if you do not want them. An advance directive may make it easier for your family to make difficult choices about your care.   Urinary Incontinence   Urinary incontinence (UI)  is when you lose control of your bladder. UI develops because your bladder cannot store or empty urine properly. The 3 most common types of UI are stress incontinence, urge incontinence, or both.  Medicines:   May be given to help strengthen your bladder control. Report any side effects of medication to your healthcare provider.  Do pelvic muscle exercises often:  Your pelvic muscles help you stop urinating. Squeeze these muscles tight for 5 seconds, then relax for 5 seconds. Gradually work up to squeezing for 10 seconds. Do 3 sets of 15 repetitions a day, or as directed. This will help strengthen your pelvic muscles and improve bladder control.  Train your bladder:  Go to the bathroom at set times, such as every 2 hours, even if you do not feel the urge to go. You can also try to hold your urine when you feel the urge to go. For example, hold your urine for 5 minutes when you feel the urge to go. As that becomes easier, hold your urine for 10 minutes.   Self-care:   Keep a UI record.  Write down how often you leak urine and how much you leak. Make a note of what you were doing when you leaked urine.  Drink liquids as directed. You may need to limit the amount of liquid you drink to help control your urine leakage. Do not drink any liquid right before you go to bed. Limit or do not have drinks that contain caffeine or alcohol.   Prevent constipation.  Eat a variety of high-fiber foods. Good examples are high-fiber cereals, beans, vegetables, and whole-grain breads. Walking is the best way to trigger your intestines to have a bowel movement.  Exercise regularly and maintain a healthy weight.  Weight loss and exercise will decrease pressure on your bladder and help you control your leakage.   Use a  catheter as directed  to help empty your bladder. A catheter is a tiny, plastic tube that is put into your bladder to drain your urine.   Go to behavior therapy as directed.  Behavior therapy may be used to help you learn to control your urge to urinate.    Cigarette Smoking and Your Health   Risks to your health if you smoke:  Nicotine and other chemicals found in tobacco damage every cell in your body. Even if you are a light smoker, you have an increased risk for cancer, heart disease, and lung disease. If you are pregnant or have diabetes, smoking increases your risk for complications.   Benefits to your health if you stop smoking:   You decrease respiratory symptoms such as coughing, wheezing, and shortness of breath.   You reduce your risk for cancers of the lung, mouth, throat, kidney, bladder, pancreas, stomach, and cervix. If you already have cancer, you increase the benefits of chemotherapy. You also reduce your risk for cancer returning or a second cancer from developing.   You reduce your risk for heart disease, blood clots, heart attack, and stroke.   You reduce your risk for lung infections, and diseases such as pneumonia, asthma, chronic bronchitis, and emphysema.  Your circulation improves. More oxygen can be delivered to your body. If you have diabetes, you lower your risk for complications, such as kidney, artery, and eye diseases. You also lower your risk for nerve damage. Nerve damage can lead to amputations, poor vision, and blindness.  You improve your body's ability to heal and to fight infections.  For more information and support to stop smoking:   Smokefree.gov  Phone: 7- 851 - 950-3923  Web Address: www.[employer]     © Copyright [employer] 2018 Information is for End User's use only and may not be sold, redistributed or otherwise used for commercial purposes. All illustrations and images included in CareNotes® are the copyrighted property of [employer] or [employer]

## 2025-04-21 NOTE — ASSESSMENT & PLAN NOTE
Patient discontinued her rosuvastatin due to myalgias.  Her LDL cholesterol was noted to be 194.  Her goal is less than 100.  I suggested we try a different statin and add co-Q10 to the regiment.  The patient was agreeable.  I started her on pravastatin 20 mg daily.  I ordered a repeat CMP and a fasting lipid panel for her next office visit.  Orders:    pravastatin (PRAVACHOL) 20 mg tablet; Take 1 tablet (20 mg total) by mouth daily    Lipid Panel with Direct LDL reflex; Future    Comprehensive metabolic panel; Future

## 2025-04-21 NOTE — ASSESSMENT & PLAN NOTE
Patient has hypertension which is well-controlled on her current medication.  Continue valsartan 80 mg daily.

## 2025-04-21 NOTE — PROGRESS NOTES
Name: Mookie Muhammad      : 1955      MRN: [id number]  Encounter Provider: Jase García DO  Encounter Date: 2025   Encounter department: [facility]  :  Assessment & Plan  Essential hypertension, benign  Patient has hypertension which is well-controlled on her current medication.  Continue valsartan 80 mg daily.         Pure hypercholesterolemia  Patient discontinued her rosuvastatin due to myalgias.  Her LDL cholesterol was noted to be 194.  Her goal is less than 100.  I suggested we try a different statin and add co-Q10 to the regiment.  The patient was agreeable.  I started her on pravastatin 20 mg daily.  I ordered a repeat CMP and a fasting lipid panel for her next office visit.  Orders:    pravastatin (PRAVACHOL) 20 mg tablet; Take 1 tablet (20 mg total) by mouth daily    Lipid Panel with Direct LDL reflex; Future    Comprehensive metabolic panel; Future    Impaired fasting glucose  Labs were reviewed.  The patient's fasting blood glucose was 90 but her hemoglobin A1c was 6.0.  I explained to the patient that that is consistent with prediabetes.  The fact that her maternal grandmother had adult-onset diabetes increases the patient's risk of developing diabetes as she gets older.  I encouraged the patient to watch her diet and exercise.  Unfortunately, she is really not able to walk due to the hip arthritis.  Orders:    Hemoglobin A1C; Future    Encounter for screening mammogram for breast cancer    Orders:    Mammo screening bilateral w 3d and cad; Future    Encounter for long-term (current) use of medications    Orders:    CBC and differential; Future    Encounter for Medicare annual wellness exam           Depression Screening and Follow-up Plan: Patient was screened for depression during today's encounter. They screened negative with a PHQ-2 score of 0.      Urinary Incontinence Plan of Care: counseling topics discussed: juany Virk (pelvic floor strengthening)  exercises.       Preventive health issues were discussed with patient, and age appropriate screening tests were ordered as noted in patient's After Visit Summary. Personalized health advice and appropriate referrals for health education or preventive services given if needed, as noted in patient's After Visit Summary.    History of Present Illness     This is a 69-year-old white female who presents to the office today for her routine checkup as well as for her annual Medicare wellness exam.  The patient is smoking.  She is currently smoking 4 cigarettes/day.  She tells me that she smoked at least 1/2 pack of cigarettes per day for many years.  She tells me she stopped and started smoking several times.  Since I last saw her, she tells me she stopped her statin therapy.  She developed myalgias and discontinued it after about a month.  She never took her potassium citrate.  She tells me the cost was $180.  I did review the patient's family history with her.  Her maternal grandmother had type 2 diabetes mellitus.       Patient Care Team:  Jase García DO as PCP - General (Family Medicine)    Review of Systems   Respiratory:  Negative for cough and shortness of breath.    Cardiovascular:  Negative for chest pain, palpitations and leg swelling.   Gastrointestinal:  Negative for abdominal distention, abdominal pain, blood in stool, constipation, diarrhea and nausea.   Genitourinary:  Negative for dysuria, frequency and hematuria.   Musculoskeletal:  Positive for arthralgias.     Medical History Reviewed by provider this encounter:       Annual Wellness Visit Questionnaire   Mookie MCKEON is here for her Subsequent Wellness visit. Last Medicare Wellness visit information reviewed, patient interviewed and updates made to the record today.      Health Risk Assessment:   Patient rates overall health as very good. Patient feels that their physical health rating is slightly better. Patient is very satisfied with their life. Eyesight  was rated as same. Hearing was rated as same. Patient feels that their emotional and mental health rating is same. Patients states they are never, rarely angry. Patient states they are never, rarely unusually tired/fatigued. Pain experienced in the last 7 days has been some. Patient's pain rating has been 7/10. Patient states that she has experienced no weight loss or gain in last 6 months. Reports right hip    Depression Screening:   PHQ-2 Score: 0      Fall Risk Screening:   In the past year, patient has experienced: no history of falling in past year      Urinary Incontinence Screening:   Patient has leaked urine accidently in the last six months.     Home Safety:  Patient does not have trouble with stairs inside or outside of their home. Patient has working smoke alarms and has working carbon monoxide detector. Home safety hazards include: none.     Nutrition:   Current diet is Regular. Advised to follow a heart healthy diet    Medications:   Patient is not currently taking any over-the-counter supplements. Patient is able to manage medications.     Activities of Daily Living (ADLs)/Instrumental Activities of Daily Living (IADLs):   Walk and transfer into and out of bed and chair?: Yes  Dress and groom yourself?: Yes    Bathe or shower yourself?: Yes    Feed yourself? Yes  Do your laundry/housekeeping?: Yes  Manage your money, pay your bills and track your expenses?: Yes  Make your own meals?: Yes    Do your own shopping?: Yes    Previous Hospitalizations:   Any hospitalizations or ED visits within the last 12 months?: Yes    How many hospitalizations have you had in the last year?: 1-2    Advance Care Planning:   Living will: No    Durable POA for healthcare: No    Advanced directive: No      Cognitive Screening:   Provider or family/friend/caregiver concerned regarding cognition?: No    Preventive Screenings      Cardiovascular Screening:    General: Screening Not Indicated and History Lipid Disorder       Diabetes Screening:     General: Screening Current      Colorectal Cancer Screening:     General: Screening Current      Breast Cancer Screening:     General: Risks and Benefits Discussed    Due for: Mammogram        Cervical Cancer Screening:    General: Screening Not Indicated      Osteoporosis Screening:    General: Risks and Benefits Discussed and Patient Declines      Lung Cancer Screening:     General: Risks and Benefits Discussed and Patient Declines      Hepatitis C Screening:    General: Screening Current    Immunizations:  - Immunizations due: Prevnar 20 and Zoster (Shingrix)  - Risks/benefits immunizations discussed    - The patient declines recommended vaccines currently despite my recommendations      Screening, Brief Intervention, and Referral to Treatment (SBIRT)     Screening  Typical number of drinks in a day: 0  Typical number of drinks in a week: 0  Interpretation: Low risk drinking behavior.    AUDIT-C Screenin) How often did you have a drink containing alcohol in the past year? never  2) How many drinks did you have on a typical day when you were drinking in the past year? 0  3) How often did you have 6 or more drinks on one occasion in the past year? never    AUDIT-C Score: 0  Interpretation: Score 0-2 (female): Negative screen for alcohol misuse    Single Item Drug Screening:  How often have you used an illegal drug (including marijuana) or a prescription medication for non-medical reasons in the past year? never    Single Item Drug Screen Score: 0  Interpretation: Negative screen for possible drug use disorder    Social Drivers of Health     Financial Resource Strain: Low Risk  (3/1/2024)    Overall Financial Resource Strain (CARDIA)     Difficulty of Paying Living Expenses: Not hard at all   Food Insecurity: No Food Insecurity (2025)    Hunger Vital Sign     Worried About Running Out of Food in the Last Year: Never true     Ran Out of Food in the Last Year: Never true  "  Transportation Needs: No Transportation Needs (4/21/2025)    PRAPARE - Transportation     Lack of Transportation (Medical): No     Lack of Transportation (Non-Medical): No   Housing Stability: Low Risk  (4/21/2025)    Housing Stability Vital Sign     Unable to Pay for Housing in the Last Year: No     Number of Times Moved in the Last Year: 0     Homeless in the Last Year: No   Utilities: Not At Risk (4/21/2025)    [facility] Utilities     Threatened with loss of utilities: No     No results found.    Objective   /74   Pulse 89   Temp (!) 96.9 °F (36.1 °C)   Ht 5' 5\" (1.651 m)   Wt 61.1 kg (134 lb 9.6 oz)   LMP  (LMP Unknown)   SpO2 100%   BMI 22.40 kg/m²     Physical Exam  Vitals reviewed.   Constitutional:       Comments: Patient is a 69-year-old white female who appears her stated age.  She is pleasant, cooperative, and in no distress.   HENT:      Head: Normocephalic and atraumatic.      Right Ear: Tympanic membrane, ear canal and external ear normal. There is no impacted cerumen.      Left Ear: Tympanic membrane, ear canal and external ear normal. There is no impacted cerumen.      Mouth/Throat:      Mouth: Mucous membranes are moist.      Pharynx: Oropharynx is clear. No oropharyngeal exudate or posterior oropharyngeal erythema.   Eyes:      General: No scleral icterus.        Right eye: No discharge.         Left eye: No discharge.      Conjunctiva/sclera: Conjunctivae normal.      Pupils: Pupils are equal, round, and reactive to light.   Neck:      Comments: No thyromegaly  Cardiovascular:      Rate and Rhythm: Normal rate and regular rhythm.      Heart sounds: Normal heart sounds. No murmur heard.     No friction rub. No gallop.   Pulmonary:      Effort: Pulmonary effort is normal. No respiratory distress.      Breath sounds: Normal breath sounds. No stridor. No wheezing, rhonchi or rales.   Abdominal:      General: Bowel sounds are normal. There is no distension.      Palpations: Abdomen is soft. " There is no mass.      Tenderness: There is no abdominal tenderness. There is no guarding.   Musculoskeletal:      Cervical back: Neck supple.   Lymphadenopathy:      Cervical: No cervical adenopathy.   Psychiatric:         Mood and Affect: Mood normal.         Behavior: Behavior normal.         Thought Content: Thought content normal.         Judgment: Judgment normal.     Extremities: Without cyanosis, clubbing, or edema

## 2025-04-21 NOTE — ASSESSMENT & PLAN NOTE
Labs were reviewed.  The patient's fasting blood glucose was 90 but her hemoglobin A1c was 6.0.  I explained to the patient that that is consistent with prediabetes.  The fact that her maternal grandmother had adult-onset diabetes increases the patient's risk of developing diabetes as she gets older.  I encouraged the patient to watch her diet and exercise.  Unfortunately, she is really not able to walk due to the hip arthritis.  Orders:    Hemoglobin A1C; Future

## 2025-05-07 ENCOUNTER — HOSPITAL ENCOUNTER (OUTPATIENT)
Dept: MAMMOGRAPHY | Facility: HOSPITAL | Age: 70
Discharge: HOME | End: 2025-05-07
Attending: FAMILY MEDICINE
Payer: COMMERCIAL

## 2025-05-07 VITALS — BODY MASS INDEX: 22.33 KG/M2 | HEIGHT: 65 IN | WEIGHT: 134 LBS

## 2025-05-07 DIAGNOSIS — Z12.31 ENCOUNTER FOR SCREENING MAMMOGRAM FOR BREAST CANCER: ICD-10-CM

## 2025-05-07 PROCEDURE — 77063 BREAST TOMOSYNTHESIS BI: CPT

## 2025-05-07 PROCEDURE — 77067 SCR MAMMO BI INCL CAD: CPT

## 2025-05-12 ENCOUNTER — HOSPITAL ENCOUNTER (OUTPATIENT)
Dept: NON INVASIVE DIAGNOSTICS | Facility: HOSPITAL | Age: 70
Discharge: HOME | End: 2025-05-12
Attending: SURGERY
Payer: COMMERCIAL

## 2025-05-12 DIAGNOSIS — I72.8 SPLENIC ARTERY ANEURYSM (HCC): ICD-10-CM

## 2025-05-12 PROCEDURE — 93975 VASCULAR STUDY: CPT

## 2025-05-12 PROCEDURE — 93975 VASCULAR STUDY: CPT | Performed by: SURGERY

## 2025-05-13 ENCOUNTER — TELEPHONE (OUTPATIENT)
Dept: VASCULAR SURGERY | Facility: CLINIC | Age: 70
End: 2025-05-13

## 2025-05-13 NOTE — TELEPHONE ENCOUNTER
This is a reminder; patient is due for OV . Please call patient and schedule the following by the dates provided.    Patient's appointment(s) are due now.    Dopplers  [] Abdominal Aorta Iliac (AOIL)  [] Carotid (CV)   [x] Celiac and/or Mesenteric  [] Endovascular Aortic Repair (EVAR)   [] Hemodialysis Access (HD)   [] Lower Limb Arterial (JESUSITA)  [] Lower Limb Venous (LEV)  [] Lower Limb Venous Duplex with Reflux (LEVDR)  [] Renal Artery  [] Upper Limb Arterial (UEA)    [] Upper Limb Venous (UEV)              [] LOUANN and Waveform analysis     Advanced Imaging   [] CTA head/neck    [] CTA abdomen    [] CTA abdomen & pelvis    [] CT abdomen with/ without contrast  [] CT abdomen with contrast  [] CT abdomen without contrast    [] CT abdomen & pelvis with/ without contrast  [] CT abdomen & pelvis with contrast  [] CT abdomen & pelvis without contrast    Office Visit   [] New patient, patient last seen over 3 years ago  [x] Risk factor modification (RFM)   [x] Follow up   [] Lost to follow up (LTFU)   This is a reminder to call patient when appts become available w/Emilee Valentine /andreina celiac 5/12/25

## 2025-05-20 ENCOUNTER — TELEPHONE (OUTPATIENT)
Dept: FAMILY MEDICINE CLINIC | Facility: CLINIC | Age: 70
End: 2025-05-20

## 2025-05-20 ENCOUNTER — NURSE TRIAGE (OUTPATIENT)
Age: 70
End: 2025-05-20

## 2025-05-20 DIAGNOSIS — N30.00 ACUTE CYSTITIS WITHOUT HEMATURIA: Primary | ICD-10-CM

## 2025-05-20 NOTE — TELEPHONE ENCOUNTER
Called Mookie to see if she was going to follow up with Urgent Care since the office was booked for today and she can not go. I did schedule Mookie tomorrow for a same day appointment. Could you put in urine orders so she can drop them off so she is prepared for her appointment tomorrow. Please Advise!

## 2025-05-20 NOTE — TELEPHONE ENCOUNTER
"FOLLOW UP: urgent care today    REASON FOR CONVERSATION: Urinary Symptoms    SYMPTOMS: increased freq, urine odor, chills    OTHER: leo at office states there are no available spot until Friday. UC recommended.     DISPOSITION: Go to Urgent Care Now (overriding See Today in Office)      Reason for Disposition   Bad or foul-smelling urine    Answer Assessment - Initial Assessment Questions  1. SYMPTOM: \"What's the main symptom you're concerned about?\" (e.g., frequency, incontinence)      Increased freq  Intermittent chills  Odor to urine    2. ONSET: \"When did the  above  start?\"      Last night  3. PAIN: \"Is there any pain?\" If Yes, ask: \"How bad is it?\" (Scale: 1-10; mild, moderate, severe)      Denies    4. CAUSE: \"What do you think is causing the symptoms?\"      Uti    5. OTHER SYMPTOMS: \"Do you have any other symptoms?\" (e.g., blood in urine, fever, flank pain, pain with urination)      Denies    Protocols used: Urinary Symptoms-Adult-[place]    "

## 2025-05-21 ENCOUNTER — APPOINTMENT (OUTPATIENT)
Dept: LAB | Facility: CLINIC | Age: 70
End: 2025-05-21
Payer: COMMERCIAL

## 2025-05-21 ENCOUNTER — OFFICE VISIT (OUTPATIENT)
Dept: FAMILY MEDICINE CLINIC | Facility: CLINIC | Age: 70
End: 2025-05-21
Payer: COMMERCIAL

## 2025-05-21 VITALS
DIASTOLIC BLOOD PRESSURE: 70 MMHG | TEMPERATURE: 97.3 F | BODY MASS INDEX: 22.9 KG/M2 | WEIGHT: 137.6 LBS | HEART RATE: 110 BPM | SYSTOLIC BLOOD PRESSURE: 118 MMHG | OXYGEN SATURATION: 98 %

## 2025-05-21 DIAGNOSIS — N30.00 ACUTE CYSTITIS WITHOUT HEMATURIA: ICD-10-CM

## 2025-05-21 DIAGNOSIS — N30.00 ACUTE CYSTITIS WITHOUT HEMATURIA: Primary | ICD-10-CM

## 2025-05-21 DIAGNOSIS — R39.9 UTI SYMPTOMS: ICD-10-CM

## 2025-05-21 PROBLEM — Z00.00 ENCOUNTER FOR MEDICARE ANNUAL WELLNESS EXAM: Status: RESOLVED | Noted: 2022-12-27 | Resolved: 2025-05-21

## 2025-05-21 LAB
BACTERIA UR QL AUTO: ABNORMAL /HPF
BILIRUB UR QL STRIP: NEGATIVE
CLARITY UR: CLEAR
COLOR UR: ABNORMAL
GLUCOSE UR STRIP-MCNC: NEGATIVE MG/DL
HGB UR QL STRIP.AUTO: ABNORMAL
KETONES UR STRIP-MCNC: NEGATIVE MG/DL
LEUKOCYTE ESTERASE UR QL STRIP: ABNORMAL
NITRITE UR QL STRIP: POSITIVE
NON-SQ EPI CELLS URNS QL MICRO: ABNORMAL /HPF
PH UR STRIP.AUTO: 6 [PH]
PROT UR STRIP-MCNC: NEGATIVE MG/DL
RBC #/AREA URNS AUTO: ABNORMAL /HPF
SL AMB  POCT GLUCOSE, UA: ABNORMAL
SL AMB LEUKOCYTE ESTERASE,UA: ABNORMAL
SL AMB POCT BILIRUBIN,UA: ABNORMAL
SL AMB POCT BLOOD,UA: ABNORMAL
SL AMB POCT CLARITY,UA: CLEAR
SL AMB POCT COLOR,UA: YELLOW
SL AMB POCT KETONES,UA: ABNORMAL
SL AMB POCT NITRITE,UA: ABNORMAL
SL AMB POCT PH,UA: 5
SL AMB POCT SPECIFIC GRAVITY,UA: 1.02
SL AMB POCT URINE PROTEIN: ABNORMAL
SL AMB POCT UROBILINOGEN: ABNORMAL
SP GR UR STRIP.AUTO: 1.02 (ref 1–1.03)
UROBILINOGEN UR STRIP-ACNC: <2 MG/DL
WBC #/AREA URNS AUTO: ABNORMAL /HPF

## 2025-05-21 PROCEDURE — 87086 URINE CULTURE/COLONY COUNT: CPT

## 2025-05-21 PROCEDURE — G2211 COMPLEX E/M VISIT ADD ON: HCPCS | Performed by: FAMILY MEDICINE

## 2025-05-21 PROCEDURE — 81001 URINALYSIS AUTO W/SCOPE: CPT

## 2025-05-21 PROCEDURE — 87077 CULTURE AEROBIC IDENTIFY: CPT

## 2025-05-21 PROCEDURE — 99213 OFFICE O/P EST LOW 20 MIN: CPT | Performed by: FAMILY MEDICINE

## 2025-05-21 PROCEDURE — 81002 URINALYSIS NONAUTO W/O SCOPE: CPT | Performed by: FAMILY MEDICINE

## 2025-05-21 PROCEDURE — 87186 SC STD MICRODIL/AGAR DIL: CPT

## 2025-05-21 RX ORDER — CEPHALEXIN 500 MG/1
500 CAPSULE ORAL 3 TIMES DAILY
Qty: 21 CAPSULE | Refills: 0 | Status: SHIPPED | OUTPATIENT
Start: 2025-05-21 | End: 2025-05-28

## 2025-05-21 NOTE — PROGRESS NOTES
Name: Mookie Muhammad      : 1955      MRN: [id number]  Encounter Provider: Jase García DO  Encounter Date: 2025   Encounter department: [facility]  :  Assessment & Plan  Acute cystitis without hematuria    Orders:    cephalexin (KEFLEX) 500 mg capsule; Take 1 capsule (500 mg total) by mouth 3 (three) times a day for 7 days    UTI symptoms  Prior to coming to the office today, the patient went to the lab and had a urinalysis as well as a urine culture.  Unfortunately, these results were currently pending.  The patient provided us a urine specimen which I had my medical assistant dip.  This was positive for leukocytes, occult blood, and ketones.  I reviewed a previous urine culture the patient had.  This was when she had referred to when she described being prescribed Ceftin.  This urine culture was done on .  It did grow out E. coli but only 20,000-29,000 CFU/milliliter.  She did have some resistant organisms and the patient is allergic to Cipro, sulfa, and Macrobid.  As such, I prescribed Keflex 500 mg 3 times per day.  If it is the same organism, then Keflex should work.  I advised the patient to drink plenty of fluids and call if no improvement or worsens.  I will contact the patient when her urine culture results become available.  Patient does have history of C. difficile and I asked her to continue taking her probiotic.  Orders:    POCT urine dip           History of Present Illness   This is a 61-year-old white female who presents to the office today with complaint of a urinary tract.  She reports foul smell in urine, chills, urinary.  She denies dysuria.  Her symptoms began 3 days ago.  The patient does tell me that she had a urine test done several months ago by her urologist.  She was told she had a urinary tract infection and she was prescribed Ceftin.  She never took the prescription.  She tells me she was afraid to take it.  The patient denies any flank  pain.  She denies fever or chills.      Review of Systems   Gastrointestinal:  Negative for abdominal distention, abdominal pain, blood in stool, constipation, diarrhea and nausea.   Genitourinary:  Positive for frequency and urgency. Negative for dysuria, flank pain and hematuria.       Objective   /70   Pulse (!) 110   Temp (!) 97.3 °F (36.3 °C)   Wt 62.4 kg (137 lb 9.6 oz)   LMP  (LMP Unknown)   SpO2 98%   BMI 22.90 kg/m²      Physical Exam  Vitals reviewed.   Constitutional:       Comments: This patient is a 69-year-old white female who appears her stated age.  The patient is nonseptic in appearance and in no apparent distress   HENT:      Head: Normocephalic and atraumatic.      Right Ear: Tympanic membrane, ear canal and external ear normal. There is no impacted cerumen.      Left Ear: Tympanic membrane, ear canal and external ear normal. There is no impacted cerumen.      Mouth/Throat:      Mouth: Mucous membranes are moist.      Pharynx: Oropharynx is clear. No oropharyngeal exudate or posterior oropharyngeal erythema.     Eyes:      General:         Right eye: No discharge.         Left eye: No discharge.      Conjunctiva/sclera: Conjunctivae normal.      Pupils: Pupils are equal, round, and reactive to light.     Neck:      Comments: No thyromegaly  Cardiovascular:      Rate and Rhythm: Normal rate and regular rhythm.      Heart sounds: Normal heart sounds. No murmur heard.     No friction rub. No gallop.   Pulmonary:      Effort: Pulmonary effort is normal. No respiratory distress.      Breath sounds: Normal breath sounds. No stridor. No wheezing, rhonchi or rales.   Abdominal:      General: There is no distension.      Palpations: Abdomen is soft. There is no mass.      Tenderness: There is no abdominal tenderness. There is no right CVA tenderness, left CVA tenderness or guarding.      Comments: Aiden sign was negative bilaterally     Musculoskeletal:      Cervical back: Neck supple.    Lymphadenopathy:      Cervical: No cervical adenopathy.     Psychiatric:         Mood and Affect: Mood normal.         Behavior: Behavior normal.         Thought Content: Thought content normal.         Judgment: Judgment normal.

## 2025-05-22 NOTE — ASSESSMENT & PLAN NOTE
Orders:    cephalexin (KEFLEX) 500 mg capsule; Take 1 capsule (500 mg total) by mouth 3 (three) times a day for 7 days

## 2025-05-22 NOTE — ASSESSMENT & PLAN NOTE
Prior to coming to the office today, the patient went to the lab and had a urinalysis as well as a urine culture.  Unfortunately, these results were currently pending.  The patient provided us a urine specimen which I had my medical assistant dip.  This was positive for leukocytes, occult blood, and ketones.  I reviewed a previous urine culture the patient had.  This was when she had referred to when she described being prescribed Ceftin.  This urine culture was done on February 24.  It did grow out E. coli but only 20,000-29,000 CFU/milliliter.  She did have some resistant organisms and the patient is allergic to Cipro, sulfa, and Macrobid.  As such, I prescribed Keflex 500 mg 3 times per day.  If it is the same organism, then Keflex should work.  I advised the patient to drink plenty of fluids and call if no improvement or worsens.  I will contact the patient when her urine culture results become available.  Patient does have history of C. difficile and I asked her to continue taking her probiotic.  Orders:    POCT urine dip

## 2025-05-23 ENCOUNTER — RESULTS FOLLOW-UP (OUTPATIENT)
Dept: FAMILY MEDICINE CLINIC | Facility: CLINIC | Age: 70
End: 2025-05-23

## 2025-05-23 LAB — BACTERIA UR CULT: ABNORMAL

## 2025-05-23 NOTE — TELEPHONE ENCOUNTER
Called patient in regards to her test results. Patient did not answer. I was able to leave a detailed message with the results and instructions on her voicemail.

## 2025-05-23 NOTE — TELEPHONE ENCOUNTER
----- Message from Jase García DO sent at 5/23/2025  8:23 AM EDT -----  Urine culture showed E Coli. Have her take the cephalexin until finished.  ----- Message -----  From: Lab, Background User  Sent: 5/21/2025   4:12 PM EDT  To: Jase García DO

## 2025-06-20 PROBLEM — N30.00 ACUTE CYSTITIS WITHOUT HEMATURIA: Status: RESOLVED | Noted: 2022-04-25 | Resolved: 2025-06-20

## 2025-06-30 ENCOUNTER — APPOINTMENT (OUTPATIENT)
Dept: LAB | Facility: CLINIC | Age: 70
End: 2025-06-30

## 2025-06-30 ENCOUNTER — TELEPHONE (OUTPATIENT)
Dept: FAMILY MEDICINE CLINIC | Facility: CLINIC | Age: 70
End: 2025-06-30

## 2025-06-30 DIAGNOSIS — R19.7 DIARRHEA, UNSPECIFIED TYPE: Primary | ICD-10-CM

## 2025-06-30 NOTE — TELEPHONE ENCOUNTER
Patient came in to the office stating she thinks she has C-Diff from the Keflex she was on for a UTI. Patient asking does she need to be seen or can you just put a stool order in for her.     Please advisie

## 2025-07-01 ENCOUNTER — APPOINTMENT (OUTPATIENT)
Dept: LAB | Facility: CLINIC | Age: 70
End: 2025-07-01
Payer: COMMERCIAL

## 2025-07-01 DIAGNOSIS — R19.7 DIARRHEA, UNSPECIFIED TYPE: ICD-10-CM

## 2025-07-01 PROCEDURE — 87493 C DIFF AMPLIFIED PROBE: CPT

## 2025-07-02 DIAGNOSIS — A04.72 C. DIFFICILE COLITIS: Primary | ICD-10-CM

## 2025-07-02 LAB
C DIFF TOX A+B STL QL IA: NEGATIVE
C DIFF TOX GENS STL QL NAA+PROBE: POSITIVE

## 2025-07-02 RX ORDER — METRONIDAZOLE 500 MG/1
500 TABLET ORAL 3 TIMES DAILY
Qty: 30 TABLET | Refills: 0 | Status: SHIPPED | OUTPATIENT
Start: 2025-07-02 | End: 2025-07-12

## 2025-07-10 ENCOUNTER — HOSPITAL ENCOUNTER (OUTPATIENT)
Dept: ULTRASOUND IMAGING | Facility: HOSPITAL | Age: 70
Discharge: HOME | End: 2025-07-10
Attending: FAMILY MEDICINE
Payer: COMMERCIAL

## 2025-07-10 ENCOUNTER — RESULTS FOLLOW-UP (OUTPATIENT)
Dept: FAMILY MEDICINE CLINIC | Facility: CLINIC | Age: 70
End: 2025-07-10

## 2025-07-10 DIAGNOSIS — R92.8 ABNORMAL SCREENING MAMMOGRAM: ICD-10-CM

## 2025-07-10 PROCEDURE — 76642 ULTRASOUND BREAST LIMITED: CPT

## 2025-07-10 NOTE — TELEPHONE ENCOUNTER
----- Message from Jase García DO sent at 7/10/2025  1:16 PM EDT -----  Let Mookie know that her diagnostic mammogram showed findings believed to be benign.  Radiologist has recommended a routine screening mammogram of the left breast in 1 year.  He recommends a repeat   diagnostic mammogram of the right breast in 6 months  ----- Message -----  From: Rox Erickson DO  Sent: 7/10/2025   9:58 AM EDT  To: Jase García DO

## 2025-08-18 ENCOUNTER — HOSPITAL ENCOUNTER (OUTPATIENT)
Dept: ULTRASOUND IMAGING | Facility: HOSPITAL | Age: 70
Discharge: HOME | End: 2025-08-18
Payer: COMMERCIAL

## 2025-08-18 DIAGNOSIS — N20.0 NEPHROLITHIASIS: ICD-10-CM

## 2025-08-18 PROCEDURE — 76775 US EXAM ABDO BACK WALL LIM: CPT

## (undated) DEVICE — SUT VICRYL 0 REEL 54 IN J287G

## (undated) DEVICE — Device: Brand: TISSUE RETRIEVAL SYSTEM

## (undated) DEVICE — VISUALIZATION SYSTEM: Brand: CLEARIFY

## (undated) DEVICE — SPECIMEN CONTAINER STERILE PEEL PACK

## (undated) DEVICE — CANNULA SEAL

## (undated) DEVICE — PENCIL ELECTROSURG E-Z CLEAN -0035H

## (undated) DEVICE — DRAPE EQUIPMENT RF WAND

## (undated) DEVICE — ABDOMINAL PAD: Brand: DERMACEA

## (undated) DEVICE — STERILE LUBRICATING JELLY, TUBE: Brand: HR LUBRICATING JELLY

## (undated) DEVICE — EXIDINE 4 PCT

## (undated) DEVICE — Device

## (undated) DEVICE — PREMIUM DRY TRAY LF: Brand: MEDLINE INDUSTRIES, INC.

## (undated) DEVICE — TRAP,MUCUS SPECIMEN, 80CC: Brand: MEDLINE

## (undated) DEVICE — CATH URETERAL 5FR X 70 CM FLEX TIP POLYUR BARD

## (undated) DEVICE — AIRSEAL TUBE SMOKE EVAC LUMENX3 FILTERED

## (undated) DEVICE — GLOVE SRG BIOGEL 7.5

## (undated) DEVICE — PLUMEPEN PRO 10FT

## (undated) DEVICE — SUT MONOCRYL 4-0 PS-2 27 IN Y426H

## (undated) DEVICE — SYRINGE 50ML LL

## (undated) DEVICE — KIT, BETHLEHEM ROBOTIC PROST: Brand: CARDINAL HEALTH

## (undated) DEVICE — CHLORAPREP HI-LITE 26ML ORANGE

## (undated) DEVICE — GLOVE INDICATOR PI UNDERGLOVE SZ 7.5 BLUE

## (undated) DEVICE — GLOVE SRG BIOGEL ECLIPSE 7.5

## (undated) DEVICE — TELFA NON-ADHERENT ABSORBENT DRESSING: Brand: TELFA

## (undated) DEVICE — GUIDEWIRE STRGHT TIP 0.035 IN  SOLO PLUS

## (undated) DEVICE — UROLOGIC DRAIN BAG: Brand: UNBRANDED

## (undated) DEVICE — ELECTRO LUBE IS A SINGLE PATIENT USE DEVICE THAT IS INTENDED TO BE USED ON ELECTROSURGICAL ELECTRODES TO REDUCE STICKING.: Brand: KEY SURGICAL ELECTRO LUBE

## (undated) DEVICE — ARM DRAPE

## (undated) DEVICE — SCD SEQUENTIAL COMPRESSION COMFORT SLEEVE MEDIUM KNEE LENGTH: Brand: KENDALL SCD

## (undated) DEVICE — SUT PDS II 1 TP-1 96 IN Z880G

## (undated) DEVICE — ADAPTER URINARY CATH SIMPLIVIA ONGUARD 2

## (undated) DEVICE — SINGLE-USE DIGITAL FLEXIBLE URETEROSCOPE: Brand: APTRA

## (undated) DEVICE — ADHESIVE SKIN HIGH VISCOSITY EXOFIN 1ML

## (undated) DEVICE — CHLORHEXIDINE 4PCT 4 OZ

## (undated) DEVICE — ELECTRODE BLADE MOD  E-Z CLEAN 6.5IN -0014M

## (undated) DEVICE — PACK TUR

## (undated) DEVICE — BASKET SPECIMEN RETRIVAL 1.9FR 120CM

## (undated) DEVICE — METZENBAUM ADTEC SINGLE USE DISSECTING SCISSORS, SHAFT ONLY, MONOPOLAR, CURVED TO LEFT, WORKING LENGTH: 12 1/4", (310 MM), DIAM. 5 MM, INSULATED, DOUBLE ACTION, STERILE, DISPOSABLE, PACKAGE OF 10 PIECES: Brand: AESCULAP

## (undated) DEVICE — SUT PLAIN 3-0 CTX 27 IN 873H

## (undated) DEVICE — MONOPOLAR CURVED SCISSORS: Brand: ENDOWRIST

## (undated) DEVICE — STRL COTTON TIP APPLCTR 6IN PK: Brand: CARDINAL HEALTH

## (undated) DEVICE — PROGRASP FORCEPS: Brand: ENDOWRIST

## (undated) DEVICE — PAD GROUNDING ADULT

## (undated) DEVICE — SHEATH URETERAL ACCESS 12/14FR 35CM PROXIS

## (undated) DEVICE — ADAPTOR SYRINGE LL ONGUARD

## (undated) DEVICE — SHEATH URETERAL ACCESS 10/12FR 35CM PROXIS

## (undated) DEVICE — ENDOPATH PNEUMONEEDLE INSUFFLATION NEEDLES WITH LUER LOCK CONNECTORS 120MM: Brand: ENDOPATH

## (undated) DEVICE — TIP COVER ACCESSORY

## (undated) DEVICE — INTENDED FOR TISSUE SEPARATION, AND OTHER PROCEDURES THAT REQUIRE A SHARP SURGICAL BLADE TO PUNCTURE OR CUT.: Brand: BARD-PARKER SAFETY BLADES SIZE 15, STERILE

## (undated) DEVICE — [HIGH FLOW INSUFFLATOR,  DO NOT USE IF PACKAGE IS DAMAGED,  KEEP DRY,  KEEP AWAY FROM SUNLIGHT,  PROTECT FROM HEAT AND RADIOACTIVE SOURCES.]: Brand: PNEUMOSURE

## (undated) DEVICE — BETHLEHEM UNIVERSAL LAPAROTOMY: Brand: CARDINAL HEALTH

## (undated) DEVICE — SYRINGE 10ML LL CONTROL TOP

## (undated) DEVICE — TOWEL SET X-RAY

## (undated) DEVICE — DRAPE FLUID WARMER (BIRD BATH)

## (undated) DEVICE — MEDI-VAC YANK SUCT HNDL W/TPRD BULBOUS TIP: Brand: CARDINAL HEALTH

## (undated) DEVICE — VESSEL SEALER EXTEND: Brand: ENDOWRIST

## (undated) DEVICE — INVIEW CLEAR LEGGINGS: Brand: CONVERTORS

## (undated) DEVICE — DRAPE LAPAROTOMY W/POUCHES

## (undated) DEVICE — SUT STRATAFIX SPIRAL PLUS 3-0 PS-2 30 X 30 CM SXMP2B408

## (undated) DEVICE — INTENDED FOR TISSUE SEPARATION, AND OTHER PROCEDURES THAT REQUIRE A SHARP SURGICAL BLADE TO PUNCTURE OR CUT.: Brand: BARD-PARKER SAFETY BLADES SIZE 11, STERILE

## (undated) DEVICE — TRAY FOLEY 16FR URIMETER SILICONE SURESTEP

## (undated) DEVICE — SUT STRATAFIX SPIRAL 2-0 CT-1 30 CM SXPP1B410

## (undated) DEVICE — MEGA SUTURECUT ND: Brand: ENDOWRIST

## (undated) DEVICE — TROCAR: Brand: KII FIOS FIRST ENTRY

## (undated) DEVICE — SURGICEL 4 X 8IN

## (undated) DEVICE — COLUMN DRAPE

## (undated) DEVICE — VALVE SUCTION-IRR 2.5MM ADJ UROSEAL

## (undated) DEVICE — CAUTERY TIP POLISHER: Brand: DEVON

## (undated) DEVICE — SPONGE STICK WITH PVP-I: Brand: KENDALL

## (undated) DEVICE — GLOVE PI ULTRA TOUCH SZ.7.5

## (undated) DEVICE — 3000CC GUARDIAN II: Brand: GUARDIAN

## (undated) DEVICE — NEEDLE SPINAL 22G X 3.5IN  QUINCKE

## (undated) DEVICE — GAUZE SPONGES,16 PLY: Brand: CURITY

## (undated) DEVICE — 40595 XL TRENDELENBURG POSITIONING KIT: Brand: 40595 XL TRENDELENBURG POSITIONING KIT

## (undated) DEVICE — NEEDLE 18 G X 1 1/2

## (undated) DEVICE — BLADELESS OBTURATOR: Brand: WECK VISTA

## (undated) DEVICE — FIBER STD QUANTA 272 MICRON